# Patient Record
Sex: FEMALE | Race: WHITE | NOT HISPANIC OR LATINO | Employment: OTHER | URBAN - METROPOLITAN AREA
[De-identification: names, ages, dates, MRNs, and addresses within clinical notes are randomized per-mention and may not be internally consistent; named-entity substitution may affect disease eponyms.]

---

## 2019-06-05 ENCOUNTER — APPOINTMENT (OUTPATIENT)
Dept: RADIOLOGY | Facility: MEDICAL CENTER | Age: 69
DRG: 602 | End: 2019-06-05
Attending: HOSPITALIST
Payer: MEDICARE

## 2019-06-05 ENCOUNTER — APPOINTMENT (OUTPATIENT)
Dept: RADIOLOGY | Facility: MEDICAL CENTER | Age: 69
DRG: 602 | End: 2019-06-05
Attending: EMERGENCY MEDICINE
Payer: MEDICARE

## 2019-06-05 ENCOUNTER — APPOINTMENT (OUTPATIENT)
Dept: CARDIOLOGY | Facility: MEDICAL CENTER | Age: 69
DRG: 602 | End: 2019-06-05
Attending: HOSPITALIST
Payer: MEDICARE

## 2019-06-05 ENCOUNTER — HOSPITAL ENCOUNTER (OUTPATIENT)
Dept: RADIOLOGY | Facility: MEDICAL CENTER | Age: 69
End: 2019-06-05

## 2019-06-05 ENCOUNTER — HOSPITAL ENCOUNTER (INPATIENT)
Facility: MEDICAL CENTER | Age: 69
LOS: 5 days | DRG: 602 | End: 2019-06-10
Attending: EMERGENCY MEDICINE | Admitting: HOSPITALIST
Payer: MEDICARE

## 2019-06-05 DIAGNOSIS — L03.116 CELLULITIS AND ABSCESS OF LEFT LOWER EXTREMITY: ICD-10-CM

## 2019-06-05 DIAGNOSIS — E66.01 CLASS 3 SEVERE OBESITY DUE TO EXCESS CALORIES WITH SERIOUS COMORBIDITY AND BODY MASS INDEX (BMI) OF 45.0 TO 49.9 IN ADULT (HCC): ICD-10-CM

## 2019-06-05 DIAGNOSIS — L02.416 CELLULITIS AND ABSCESS OF LEFT LOWER EXTREMITY: ICD-10-CM

## 2019-06-05 DIAGNOSIS — J96.21 ACUTE ON CHRONIC RESPIRATORY FAILURE WITH HYPOXIA (HCC): ICD-10-CM

## 2019-06-05 PROBLEM — E03.8 OTHER SPECIFIED HYPOTHYROIDISM: Status: ACTIVE | Noted: 2019-06-05

## 2019-06-05 PROBLEM — R60.0 LOWER EXTREMITY EDEMA: Status: ACTIVE | Noted: 2019-06-05

## 2019-06-05 PROBLEM — R73.9 HYPERGLYCEMIA: Status: ACTIVE | Noted: 2019-06-05

## 2019-06-05 PROBLEM — E87.6 HYPOKALEMIA: Status: ACTIVE | Noted: 2019-06-05

## 2019-06-05 PROBLEM — I10 HYPERTENSION: Status: ACTIVE | Noted: 2019-06-05

## 2019-06-05 PROBLEM — J41.0 SIMPLE CHRONIC BRONCHITIS (HCC): Status: ACTIVE | Noted: 2019-06-05

## 2019-06-05 PROBLEM — D75.89 MACROCYTOSIS: Status: ACTIVE | Noted: 2019-06-05

## 2019-06-05 PROBLEM — E53.8 VITAMIN B12 DEFICIENCY: Status: ACTIVE | Noted: 2019-06-05

## 2019-06-05 PROBLEM — J18.9 PNEUMONIA DUE TO INFECTIOUS ORGANISM: Status: ACTIVE | Noted: 2019-06-05

## 2019-06-05 LAB
ANION GAP SERPL CALC-SCNC: 8 MMOL/L (ref 0–11.9)
BASOPHILS # BLD AUTO: 0.3 % (ref 0–1.8)
BASOPHILS # BLD: 0.02 K/UL (ref 0–0.12)
BNP SERPL-MCNC: 26 PG/ML (ref 0–100)
BUN SERPL-MCNC: 21 MG/DL (ref 8–22)
CALCIUM SERPL-MCNC: 9.3 MG/DL (ref 8.5–10.5)
CHLORIDE SERPL-SCNC: 99 MMOL/L (ref 96–112)
CO2 SERPL-SCNC: 32 MMOL/L (ref 20–33)
CORTIS SERPL-MCNC: 11 UG/DL (ref 0–23)
CREAT SERPL-MCNC: 1 MG/DL (ref 0.5–1.4)
EKG IMPRESSION: NORMAL
EOSINOPHIL # BLD AUTO: 0.07 K/UL (ref 0–0.51)
EOSINOPHIL NFR BLD: 1.2 % (ref 0–6.9)
ERYTHROCYTE [DISTWIDTH] IN BLOOD BY AUTOMATED COUNT: 50.2 FL (ref 35.9–50)
EST. AVERAGE GLUCOSE BLD GHB EST-MCNC: 111 MG/DL
FOLATE SERPL-MCNC: 6.4 NG/ML
GLUCOSE SERPL-MCNC: 132 MG/DL (ref 65–99)
HBA1C MFR BLD: 5.5 % (ref 0–5.6)
HCT VFR BLD AUTO: 46.1 % (ref 37–47)
HGB BLD-MCNC: 14.7 G/DL (ref 12–16)
IMM GRANULOCYTES # BLD AUTO: 0.03 K/UL (ref 0–0.11)
IMM GRANULOCYTES NFR BLD AUTO: 0.5 % (ref 0–0.9)
LV EJECT FRACT  99904: 70
LV EJECT FRACT MOD 2C 99903: 87.54
LV EJECT FRACT MOD 4C 99902: 83.59
LV EJECT FRACT MOD BP 99901: 86.56
LYMPHOCYTES # BLD AUTO: 0.58 K/UL (ref 1–4.8)
LYMPHOCYTES NFR BLD: 9.5 % (ref 22–41)
MCH RBC QN AUTO: 32.8 PG (ref 27–33)
MCHC RBC AUTO-ENTMCNC: 31.9 G/DL (ref 33.6–35)
MCV RBC AUTO: 102.9 FL (ref 81.4–97.8)
MONOCYTES # BLD AUTO: 0.16 K/UL (ref 0–0.85)
MONOCYTES NFR BLD AUTO: 2.6 % (ref 0–13.4)
NEUTROPHILS # BLD AUTO: 5.22 K/UL (ref 2–7.15)
NEUTROPHILS NFR BLD: 85.9 % (ref 44–72)
NRBC # BLD AUTO: 0 K/UL
NRBC BLD-RTO: 0 /100 WBC
PLATELET # BLD AUTO: 133 K/UL (ref 164–446)
PMV BLD AUTO: 10.1 FL (ref 9–12.9)
POTASSIUM SERPL-SCNC: 3.1 MMOL/L (ref 3.6–5.5)
PROCALCITONIN SERPL-MCNC: 0.08 NG/ML
RBC # BLD AUTO: 4.48 M/UL (ref 4.2–5.4)
SODIUM SERPL-SCNC: 139 MMOL/L (ref 135–145)
T4 FREE SERPL-MCNC: 0.35 NG/DL (ref 0.53–1.43)
TROPONIN I SERPL-MCNC: <0.01 NG/ML (ref 0–0.04)
TSH SERPL DL<=0.005 MIU/L-ACNC: 21.77 UIU/ML (ref 0.38–5.33)
VIT B12 SERPL-MCNC: 237 PG/ML (ref 211–911)
WBC # BLD AUTO: 6.1 K/UL (ref 4.8–10.8)

## 2019-06-05 PROCEDURE — 94760 N-INVAS EAR/PLS OXIMETRY 1: CPT

## 2019-06-05 PROCEDURE — 93306 TTE W/DOPPLER COMPLETE: CPT

## 2019-06-05 PROCEDURE — 96375 TX/PRO/DX INJ NEW DRUG ADDON: CPT

## 2019-06-05 PROCEDURE — 84484 ASSAY OF TROPONIN QUANT: CPT

## 2019-06-05 PROCEDURE — 96365 THER/PROPH/DIAG IV INF INIT: CPT

## 2019-06-05 PROCEDURE — 700105 HCHG RX REV CODE 258: Performed by: HOSPITALIST

## 2019-06-05 PROCEDURE — 93971 EXTREMITY STUDY: CPT | Mod: 26,LT,GZ | Performed by: SURGERY

## 2019-06-05 PROCEDURE — 770020 HCHG ROOM/CARE - TELE (206)

## 2019-06-05 PROCEDURE — 93306 TTE W/DOPPLER COMPLETE: CPT | Mod: 26 | Performed by: INTERNAL MEDICINE

## 2019-06-05 PROCEDURE — 304561 HCHG STAT O2

## 2019-06-05 PROCEDURE — 93005 ELECTROCARDIOGRAM TRACING: CPT | Performed by: EMERGENCY MEDICINE

## 2019-06-05 PROCEDURE — 700111 HCHG RX REV CODE 636 W/ 250 OVERRIDE (IP): Performed by: HOSPITALIST

## 2019-06-05 PROCEDURE — 700101 HCHG RX REV CODE 250: Performed by: HOSPITALIST

## 2019-06-05 PROCEDURE — 99285 EMERGENCY DEPT VISIT HI MDM: CPT

## 2019-06-05 PROCEDURE — 83036 HEMOGLOBIN GLYCOSYLATED A1C: CPT

## 2019-06-05 PROCEDURE — 93971 EXTREMITY STUDY: CPT | Mod: LT

## 2019-06-05 PROCEDURE — 80048 BASIC METABOLIC PNL TOTAL CA: CPT

## 2019-06-05 PROCEDURE — 94640 AIRWAY INHALATION TREATMENT: CPT

## 2019-06-05 PROCEDURE — 84443 ASSAY THYROID STIM HORMONE: CPT

## 2019-06-05 PROCEDURE — 700102 HCHG RX REV CODE 250 W/ 637 OVERRIDE(OP): Performed by: HOSPITALIST

## 2019-06-05 PROCEDURE — 85025 COMPLETE CBC W/AUTO DIFF WBC: CPT

## 2019-06-05 PROCEDURE — 71045 X-RAY EXAM CHEST 1 VIEW: CPT

## 2019-06-05 PROCEDURE — A9270 NON-COVERED ITEM OR SERVICE: HCPCS | Performed by: HOSPITALIST

## 2019-06-05 PROCEDURE — 82607 VITAMIN B-12: CPT

## 2019-06-05 PROCEDURE — 84439 ASSAY OF FREE THYROXINE: CPT

## 2019-06-05 PROCEDURE — 84145 PROCALCITONIN (PCT): CPT

## 2019-06-05 PROCEDURE — 82746 ASSAY OF FOLIC ACID SERUM: CPT

## 2019-06-05 PROCEDURE — 82533 TOTAL CORTISOL: CPT

## 2019-06-05 PROCEDURE — 83880 ASSAY OF NATRIURETIC PEPTIDE: CPT

## 2019-06-05 PROCEDURE — 93005 ELECTROCARDIOGRAM TRACING: CPT

## 2019-06-05 PROCEDURE — 99223 1ST HOSP IP/OBS HIGH 75: CPT | Performed by: HOSPITALIST

## 2019-06-05 RX ORDER — AMOXICILLIN 250 MG
2 CAPSULE ORAL 2 TIMES DAILY
Status: DISCONTINUED | OUTPATIENT
Start: 2019-06-05 | End: 2019-06-06

## 2019-06-05 RX ORDER — CYANOCOBALAMIN 1000 UG/ML
1000 INJECTION, SOLUTION INTRAMUSCULAR; SUBCUTANEOUS ONCE
Status: COMPLETED | OUTPATIENT
Start: 2019-06-05 | End: 2019-06-05

## 2019-06-05 RX ORDER — ALBUTEROL SULFATE 90 UG/1
2 AEROSOL, METERED RESPIRATORY (INHALATION) EVERY 6 HOURS PRN
Status: ON HOLD | COMMUNITY
End: 2019-10-29

## 2019-06-05 RX ORDER — HYDROCODONE BITARTRATE AND ACETAMINOPHEN 5; 325 MG/1; MG/1
1 TABLET ORAL DAILY
Status: ON HOLD | COMMUNITY
End: 2019-10-29

## 2019-06-05 RX ORDER — POTASSIUM CHLORIDE 20 MEQ/1
20 TABLET, EXTENDED RELEASE ORAL ONCE
Status: DISCONTINUED | OUTPATIENT
Start: 2019-06-05 | End: 2019-06-05

## 2019-06-05 RX ORDER — AZITHROMYCIN 250 MG/1
500 TABLET, FILM COATED ORAL DAILY
Status: DISCONTINUED | OUTPATIENT
Start: 2019-06-05 | End: 2019-06-06

## 2019-06-05 RX ORDER — POLYETHYLENE GLYCOL 3350 17 G/17G
1 POWDER, FOR SOLUTION ORAL
Status: DISCONTINUED | OUTPATIENT
Start: 2019-06-05 | End: 2019-06-06

## 2019-06-05 RX ORDER — NADOLOL 40 MG/1
40 TABLET ORAL DAILY
Status: ON HOLD | COMMUNITY
End: 2019-11-19

## 2019-06-05 RX ORDER — OXYCODONE HYDROCHLORIDE 5 MG/1
2.5 TABLET ORAL
Status: DISCONTINUED | OUTPATIENT
Start: 2019-06-05 | End: 2019-06-10 | Stop reason: HOSPADM

## 2019-06-05 RX ORDER — BISACODYL 10 MG
10 SUPPOSITORY, RECTAL RECTAL
Status: DISCONTINUED | OUTPATIENT
Start: 2019-06-05 | End: 2019-06-06

## 2019-06-05 RX ORDER — POTASSIUM CHLORIDE 20 MEQ/1
40 TABLET, EXTENDED RELEASE ORAL 2 TIMES DAILY
Status: DISCONTINUED | OUTPATIENT
Start: 2019-06-05 | End: 2019-06-10

## 2019-06-05 RX ORDER — ACETAMINOPHEN 325 MG/1
650 TABLET ORAL EVERY 6 HOURS PRN
Status: DISCONTINUED | OUTPATIENT
Start: 2019-06-05 | End: 2019-06-10 | Stop reason: HOSPADM

## 2019-06-05 RX ORDER — HYDROMORPHONE HYDROCHLORIDE 1 MG/ML
0.25 INJECTION, SOLUTION INTRAMUSCULAR; INTRAVENOUS; SUBCUTANEOUS
Status: DISCONTINUED | OUTPATIENT
Start: 2019-06-05 | End: 2019-06-10 | Stop reason: HOSPADM

## 2019-06-05 RX ORDER — ONDANSETRON 4 MG/1
4 TABLET, ORALLY DISINTEGRATING ORAL EVERY 4 HOURS PRN
Status: DISCONTINUED | OUTPATIENT
Start: 2019-06-05 | End: 2019-06-10 | Stop reason: HOSPADM

## 2019-06-05 RX ORDER — ONDANSETRON 2 MG/ML
4 INJECTION INTRAMUSCULAR; INTRAVENOUS EVERY 4 HOURS PRN
Status: DISCONTINUED | OUTPATIENT
Start: 2019-06-05 | End: 2019-06-10 | Stop reason: HOSPADM

## 2019-06-05 RX ORDER — IPRATROPIUM BROMIDE AND ALBUTEROL SULFATE 2.5; .5 MG/3ML; MG/3ML
3 SOLUTION RESPIRATORY (INHALATION)
Status: DISCONTINUED | OUTPATIENT
Start: 2019-06-05 | End: 2019-06-10 | Stop reason: HOSPADM

## 2019-06-05 RX ORDER — FUROSEMIDE 10 MG/ML
20 INJECTION INTRAMUSCULAR; INTRAVENOUS DAILY
Status: DISCONTINUED | OUTPATIENT
Start: 2019-06-05 | End: 2019-06-08

## 2019-06-05 RX ORDER — CHOLECALCIFEROL (VITAMIN D3) 125 MCG
1000 CAPSULE ORAL DAILY
Status: DISCONTINUED | OUTPATIENT
Start: 2019-06-06 | End: 2019-06-10 | Stop reason: HOSPADM

## 2019-06-05 RX ORDER — OXYCODONE HYDROCHLORIDE 5 MG/1
5 TABLET ORAL
Status: DISCONTINUED | OUTPATIENT
Start: 2019-06-05 | End: 2019-06-10 | Stop reason: HOSPADM

## 2019-06-05 RX ORDER — ALBUTEROL SULFATE 90 UG/1
2 AEROSOL, METERED RESPIRATORY (INHALATION) EVERY 4 HOURS PRN
Status: DISCONTINUED | OUTPATIENT
Start: 2019-06-05 | End: 2019-06-10 | Stop reason: HOSPADM

## 2019-06-05 RX ORDER — LEVOTHYROXINE SODIUM 0.12 MG/1
125 TABLET ORAL
Status: DISCONTINUED | OUTPATIENT
Start: 2019-06-05 | End: 2019-06-05

## 2019-06-05 RX ORDER — LEVOTHYROXINE SODIUM 0.12 MG/1
125 TABLET ORAL
Status: ON HOLD | COMMUNITY
End: 2019-06-10

## 2019-06-05 RX ORDER — NADOLOL 20 MG/1
20 TABLET ORAL
Status: DISCONTINUED | OUTPATIENT
Start: 2019-06-05 | End: 2019-06-05

## 2019-06-05 RX ADMIN — FUROSEMIDE 20 MG: 10 INJECTION, SOLUTION INTRAVENOUS at 05:45

## 2019-06-05 RX ADMIN — CYANOCOBALAMIN 1000 MCG: 1000 INJECTION INTRAMUSCULAR; SUBCUTANEOUS at 17:56

## 2019-06-05 RX ADMIN — POTASSIUM CHLORIDE 40 MEQ: 1500 TABLET, EXTENDED RELEASE ORAL at 05:45

## 2019-06-05 RX ADMIN — SENNOSIDES,DOCUSATE SODIUM 2 TABLET: 8.6; 5 TABLET, FILM COATED ORAL at 16:58

## 2019-06-05 RX ADMIN — IPRATROPIUM BROMIDE AND ALBUTEROL SULFATE 3 ML: .5; 3 SOLUTION RESPIRATORY (INHALATION) at 21:42

## 2019-06-05 RX ADMIN — AZITHROMYCIN 500 MG: 250 TABLET, FILM COATED ORAL at 05:45

## 2019-06-05 RX ADMIN — CEFTRIAXONE SODIUM 2 G: 2 INJECTION, POWDER, FOR SOLUTION INTRAMUSCULAR; INTRAVENOUS at 05:44

## 2019-06-05 RX ADMIN — POTASSIUM CHLORIDE 40 MEQ: 1500 TABLET, EXTENDED RELEASE ORAL at 16:58

## 2019-06-05 RX ADMIN — LEVOTHYROXINE SODIUM 137 MCG: 25 TABLET ORAL at 11:12

## 2019-06-05 RX ADMIN — BISACODYL 10 MG: 10 SUPPOSITORY RECTAL at 16:58

## 2019-06-05 RX ADMIN — ENOXAPARIN SODIUM 30 MG: 100 INJECTION SUBCUTANEOUS at 05:45

## 2019-06-05 RX ADMIN — ENOXAPARIN SODIUM 30 MG: 100 INJECTION SUBCUTANEOUS at 16:58

## 2019-06-05 RX ADMIN — ONDANSETRON 4 MG: 2 INJECTION INTRAMUSCULAR; INTRAVENOUS at 08:20

## 2019-06-05 RX ADMIN — ACETAMINOPHEN 650 MG: 325 TABLET, FILM COATED ORAL at 13:26

## 2019-06-05 ASSESSMENT — PATIENT HEALTH QUESTIONNAIRE - PHQ9
SUM OF ALL RESPONSES TO PHQ9 QUESTIONS 1 AND 2: 0
1. LITTLE INTEREST OR PLEASURE IN DOING THINGS: NOT AT ALL
2. FEELING DOWN, DEPRESSED, IRRITABLE, OR HOPELESS: NOT AT ALL

## 2019-06-05 ASSESSMENT — COPD QUESTIONNAIRES
HAVE YOU SMOKED AT LEAST 100 CIGARETTES IN YOUR ENTIRE LIFE: YES
IN THE PAST 12 MONTHS DO YOU DO LESS THAN YOU USED TO BECAUSE OF YOUR BREATHING PROBLEMS: AGREE
DURING THE PAST 4 WEEKS HOW MUCH DID YOU FEEL SHORT OF BREATH: SOME OF THE TIME
DO YOU EVER COUGH UP ANY MUCUS OR PHLEGM?: YES, A FEW DAYS A WEEK OR MONTH
COPD SCREENING SCORE: 8
DO YOU EVER COUGH UP ANY MUCUS OR PHLEGM?: YES, A FEW DAYS A WEEK OR MONTH
HAVE YOU SMOKED AT LEAST 100 CIGARETTES IN YOUR ENTIRE LIFE: YES
COPD SCREENING SCORE: 7
DURING THE PAST 4 WEEKS HOW MUCH DID YOU FEEL SHORT OF BREATH: SOME OF THE TIME

## 2019-06-05 ASSESSMENT — COGNITIVE AND FUNCTIONAL STATUS - GENERAL
DRESSING REGULAR UPPER BODY CLOTHING: A LOT
DRESSING REGULAR LOWER BODY CLOTHING: A LOT
WALKING IN HOSPITAL ROOM: TOTAL
TOILETING: A LOT
TURNING FROM BACK TO SIDE WHILE IN FLAT BAD: A LOT
MOVING FROM LYING ON BACK TO SITTING ON SIDE OF FLAT BED: UNABLE
MOBILITY SCORE: 8
SUGGESTED CMS G CODE MODIFIER DAILY ACTIVITY: CK
STANDING UP FROM CHAIR USING ARMS: TOTAL
HELP NEEDED FOR BATHING: A LOT
SUGGESTED CMS G CODE MODIFIER MOBILITY: CM
CLIMB 3 TO 5 STEPS WITH RAILING: TOTAL
DAILY ACTIVITIY SCORE: 15
MOVING TO AND FROM BED TO CHAIR: A LOT
PERSONAL GROOMING: A LITTLE

## 2019-06-05 ASSESSMENT — LIFESTYLE VARIABLES
EVER_SMOKED: YES
EVER_SMOKED: YES
ALCOHOL_USE: NO

## 2019-06-05 NOTE — ASSESSMENT & PLAN NOTE
hold her nadolol at this time given her low blood pressure  Monitor blood pressure and adjust accordingly

## 2019-06-05 NOTE — PROGRESS NOTES
2 RN skin check complete with KENZIE Arreguin.   Devices in place Oxygen tubing, gonzalez catheter.  Skin assessed under devices: Redness noted to left ear, blanchable..  Confirmed pressure ulcers found: Moisture associated dermatitis to right pannus and under left breast. Moisture fissure noted to sacrum. Heals pink, blanchable. Cellulitis to left leg, red, warm, non-blanchable.   Wound consult placed: Yes.  The following interventions in place: Waffle over lay to mattress, heal elevation boots, mepilex to sacrum, Interdry to pannus and breast folds. Patient turned q2hrs.

## 2019-06-05 NOTE — PROGRESS NOTES
Pt. Arrived via stretcher thru ER . Assumed care. Pt. Is awake, on bed. Initial vital signs taken and documented. Admit profile, med rec and assessment complete .A&Ox4, Patient currently bedbound,  7/10 pain in abdomen, due medications given. IV access saline locked. Pt. On 4L O2 via nasal cannula, tolerating well. Plan of care was discussed. Bed alarm in use, call light and personal belongings within reach, bed kept low, treaded socks on. Assisted as necessary. Kept rested and comfortable at all times.

## 2019-06-05 NOTE — ED NOTES
Pt rounded on, asleep in bed, respirations even and unlabored, repositioning self as needed, awaiting hospital bed.

## 2019-06-05 NOTE — ED NOTES
Med Rec Updated and Complete per Pt at bedside  Allergies Reviewed  No PO ABX last 30 days.    Pt knows medication history well. Pt reports taking Norco 5-325mg once daily every day, scheduled.

## 2019-06-05 NOTE — ED NOTES
Pt rounded on, resting in bed, respirations even and unlabored, repositioning self as needed, water and blankets provided.

## 2019-06-05 NOTE — ED NOTES
Report from Steff EPSINO, assume care. Assist Pt to reposition on gurney, call light within reach.

## 2019-06-05 NOTE — ED TRIAGE NOTES
"Cheryl Bajwa  68 y.o. female    Chief Complaint   Patient presents with   • Shortness of Breath     Pt called EMS after increasing SOB the last few days. Pt typically wears 3L NC at home, pt required 5L PTA. Respirations even and unlabored at this time. [facility] reports possible pneumonia or CHF exacerabtion.    • Peripheral Edema     Pt reports \"the last few days it's been swelling up real bad.\" Bilateral LE 4+ pitting edema and redness noted. [facility] diagnosed pt with LE cellulitis.      Pt given 2l NS PTA.    Pt is alert and oriented, speaking in full sentences, follows commands and responds appropriately to questions. Resp are even and unlabored.   "

## 2019-06-05 NOTE — ASSESSMENT & PLAN NOTE
Possible pneumonia was initially suspected  Initially placed on IV ceftriaxone for her cellulitis and pneumonia and on azithromycin  Procalcitonin negative

## 2019-06-05 NOTE — ED PROVIDER NOTES
"ED Provider Note    Scribed for Angel Rojas M.D. by Angel Rojas. 6/5/2019,  1:16 AM.    CHIEF COMPLAINT  Chief Complaint   Patient presents with   • Shortness of Breath     Pt called EMS after increasing SOB the last few days. Pt typically wears 3L NC at home, pt required 5L PTA. Respirations even and unlabored at this time. [facility] reports possible pneumonia or CHF exacerabtion.    • Peripheral Edema     Pt reports \"the last few days it's been swelling up real bad.\" Bilateral LE 4+ pitting edema and redness noted. [facility] diagnosed pt with LE cellulitis.        HPI  Cheryl Bajwa is a 68 y.o. female who presents to the Emergency Department as a transfer from [facility], with reported diagnoses of left leg cellulitis, shortness of breath, and pneumonia.  Prior to arrival, she received oral and IV antibiotics, azithromycin and Rocephin.  She also received 1500 cc of fluid.  She presented to the outside hospital after calling EMS because of increasing shortness of breath for the past few days, most notably, but perhaps as long as the last month.  She has a history of a chronic cough.  She reports some intermittent nonspecific anterior chest pain over days to weeks as well.  She is no longer smoker.  Because of a history of COPD, she wears 3 L by nasal cannula at home at baseline, but has been requiring 5 L to maintain her usual saturations.  She reports that her bilateral lower extremity edema has been especially notable over the past 4 days.  She denies fevers.  She has not had any documented fevers at the transferring hospital.  I reviewed her laboratory tests, and she has a normal CBC and differential, with the exception of a slight neutrophilic predominance at 78%, and immature granulocytes of 0.3%, which is slightly elevated.  There is no bandemia.  Her chemistry is grossly unremarkable.  She had urinalysis 2+ leuk esterase and 1+ bacteria.  Her BNP was normal.  Apparently, the reason for " "the transfer was that she had an initial blood pressure in the 130s systolic, which decreased to the 90s, though she maintained a reported map around 65, but there was concern that she would need to go to the ICU because of hypotension, though there is no such hypotension on arrival.    Her actual EKG report shows \"there is some apparent increased density in the right hilar region which in part probably is artifactual from rotation but may also be due to underlying pneumonia or mild congestive heart failure.\"  Given her lack of fevers and lack of leukocytosis, I think pneumonia is unlikely.    The patient herself is a very limited historian.  She reports feeling \"punky,\" 4 days to weeks, with a main complaint of being low energy, and of having swollen legs.    REVIEW OF SYSTEMS  See HPI for further details. All other systems are negative.     PAST MEDICAL HISTORY   has a past medical history of Chronic obstructive pulmonary disease (HCC); Hypothyroid; and Mitral valve disease.    SOCIAL HISTORY  Social History     Social History Main Topics   • Smoking status: Former Smoker     Types: Cigarettes     Quit date: 2016   • Smokeless tobacco: Never Used   • Alcohol use No   • Drug use: No   • Sexual activity: Not on file     History   Drug Use No       SURGICAL HISTORY   has a past surgical history that includes gyn surgery.    CURRENT MEDICATIONS  Home Medications    **Home medications have not yet been reviewed for this encounter**         ALLERGIES  Allergies   Allergen Reactions   • Doxycycline      headache   • Penicillins    • Sulfa Drugs        PHYSICAL EXAM  VITAL SIGNS: Pulse (!) 56   Temp 36.1 °C (97 °F) (Temporal)   Resp 18   Ht 1.702 m (5' 7\")   Wt (!) 144.2 kg (318 lb)   SpO2 95%   BMI 49.81 kg/m²   Pulse ox interpretation: I interpret this pulse ox as normal.  Constitutional: Alert in no apparent distress.  Extreme morbid obesity.  HENT: No signs of trauma, Bilateral external ears normal, Nose normal. "   Eyes: Conjunctiva normal, Non-icteric.   Neck: Normal range of motion, Supple, No stridor.   Lymphatic: No lymphadenopathy noted.   Cardiovascular: Regular rate and rhythm, no murmurs.   Thorax & Lungs: Normal breath sounds, No respiratory distress, No wheezing, No chest tenderness.   Abdomen: Extreme morbid obesity, bowel sounds normal, Soft, No tenderness, No masses, No pulsatile masses. No peritoneal signs.  Skin: Warm, Dry, fairly chronic appearing poorly demarcated erythema to the dorsum of the left foot and significant anterior lateral portion of the left lower leg, without warmth or fluctuance.  Extremities: Intact distal pulses, significant pitting edema to the bilateral lower extremities, slightly greater on the left than the right, No cyanosis.  Musculoskeletal: Good range of motion in all major joints. No or major deformities noted.   Neurologic: Alert , Normal motor function, Normal sensory function, No focal deficits noted.   Psychiatric: Affect normal, Judgment normal, Mood normal.     DIAGNOSTIC STUDIES / PROCEDURES    EKG  Interpreted by me    Rhythm:  Normal sinus rhythm   Rate: 56  Axis: normal  Intervals: normal  Ectopy: none  Conduction: 1st degree block. Diffuse low voltage, likely due to morbid obesity.  ST Segments: no acute change  T Waves: no acute change  Q Waves: none  No Old EKG.    LABS  Labs Reviewed   BTYPE NATRIURETIC PEPTIDE   CBC WITH DIFFERENTIAL   BASIC METABOLIC PANEL   TROPONIN   TSH WITH REFLEX TO FT4     All labs reviewed by me.    RADIOLOGY  OUTSIDE IMAGES-DX CHEST   Final Result      DX-CHEST-LIMITED (1 VIEW)    (Results Pending)     The radiologist's interpretation of all radiological studies have been reviewed by me.    COURSE & MEDICAL DECISION MAKING  Nursing notes, VS, PMSFHx reviewed in chart.     1:16 AM Patient seen and examined at bedside. Differential diagnosis includes but is not limited to CHF, pneumonia, cellulitis, hypothyroidism, hypoxia. Ordered for repeat  laboratory tests to evaluate.  The patient's skin changes on her leg seem more likely to represent stasis dermatitis than cellulitis, given her reported chronicity, and lack of fever or leukocytosis.  The peripheral edema is likely secondary to CHF, and the patient reports a cardiac echo years ago, but no recent evaluation, and does not think she carries an official diagnosis of congestive heart failure, nor is she medicated for congestive heart failure.  There is no evidence of instability or hypotension.  Do not think she needs to be admitted to the ICU, though I think she should be admitted for further evaluation of her peripheral edema and increased oxygen requirement/shortness of breath.    1:41 AM I've paged the hospitalist, Dr. Radha Camarillo.    2:01 AM Dr. Radha Camarillo agrees to admit.    DISPOSITION:  Patient will be admitted to the hospitalist, Dr. Radha Camarillo. in stable condition.      FINAL IMPRESSION  1. CHF  2. Peripheral edema  3. Increased oxygen requirement  4. Transient hypotension

## 2019-06-05 NOTE — ASSESSMENT & PLAN NOTE
Given her asymmetric edema we will check left lower extremity duplex  Echo unremarkable  We will start on IV Lasix and monitor intake and output

## 2019-06-05 NOTE — PROGRESS NOTES
Hospital Medicine Daily Progress Note    Date of Service  6/5/2019    Chief Complaint  68 y.o. female admitted 6/5/2019 with Cellulitis and possibly pneumonia    Interval Problem Update  6/4: Admitted and levothyroxine increased.  Still having soft blood pressures although patient says this is normal for her.  Confirmed the patient does have RT protocol so she can get nebulizations   Repleting B12 IM and then orally      Disposition  Pending improvement of cellulitis and possibly pneumonia    Review of Systems  ROS     Physical Exam  Temp:  [36.1 °C (97 °F)-36.4 °C (97.5 °F)] 36.4 °C (97.5 °F)  Pulse:  [54-71] 65  Resp:  [14-28] 20  SpO2:  [90 %-96 %] 94 %    Physical Exam    Fluids    Intake/Output Summary (Last 24 hours) at 06/05/19 1555  Last data filed at 06/05/19 0629   Gross per 24 hour   Intake              100 ml   Output                0 ml   Net              100 ml       Laboratory  Recent Labs      06/05/19   0141   WBC  6.1   RBC  4.48   HEMOGLOBIN  14.7   HEMATOCRIT  46.1   MCV  102.9*   MCH  32.8   MCHC  31.9*   RDW  50.2*   PLATELETCT  133*   MPV  10.1     Recent Labs      06/05/19   0141   SODIUM  139   POTASSIUM  3.1*   CHLORIDE  99   CO2  32   GLUCOSE  132*   BUN  21   CREATININE  1.00   CALCIUM  9.3         Recent Labs      06/05/19   0141   BNPBTYPENAT  26           Imaging  US-EXTREMITY VENOUS LOWER UNILAT LEFT   Final Result      EC-ECHOCARDIOGRAM COMPLETE W/O CONT         DX-CHEST-LIMITED (1 VIEW)   Final Result         Persistent bibasilar consolidation, right greater than left with probable associated pleural fluid.      OUTSIDE IMAGES-DX CHEST   Final Result           Assessment/Plan  Hyperglycemia   Assessment & Plan    HbA1c 5.5  We will manage insulin levels     Macrocytosis   Assessment & Plan    B12 low at 237 folate unremarkable  Repleting B12 IM and then orally     Hypokalemia   Assessment & Plan    Replete with oral potassium chloride 40 mEq twice daily and monitor levels  Check  magnesium     Simple chronic bronchitis (HCC)   Assessment & Plan    No signs of acute exacerbation   bronchodilators per RT protocol     Hypertension   Assessment & Plan    We will hold her nadolol at this time given her low blood pressure prior to transfer  Monitor blood pressure and adjust accordingly     Other specified hypothyroidism   Assessment & Plan    We will increase her levothyroxine to 137 MCG she will need recheck TFTs in 6 to 8 weeks     Pneumonia due to infectious organism   Assessment & Plan    Possible pneumonia    We will start her on IV ceftriaxone for her cellulitis and pneumonia and on azithromycin  Check procalcitonin  Follow-up on cultures and de-escalate accordingly  She will need follow-up imaging persistent infiltrate may need further work-up with CT     Lower extremity edema   Assessment & Plan    Given her asymmetric edema we will check left lower extremity duplex  We will check echocardiogram to assess RV function  We will start on IV Lasix and monitor intake and output     Class 3 severe obesity due to excess calories with serious comorbidity and body mass index (BMI) of 45.0 to 49.9 in adult ([facility])   Assessment & Plan    Body mass index is 49.81 kg/m².      Cellulitis and abscess of left lower extremity   Assessment & Plan    Will start on IV ceftriaxone  Close clinical monitoring          VTE prophylaxis: enoxaparin

## 2019-06-05 NOTE — H&P
Hospital Medicine History & Physical Note    Date of Service  6/5/2019    Primary Care Physician  No primary care provider on file.    Consultants  None    Code Status  Full code    Chief Complaint  Edema and dyspnea    History of Presenting Illness  68 y.o. female who presented 6/5/2019 with history of COPD sleep apnea and hypothyroidism.  She was seen at her local emergency room there was concern for possible pneumonia and cellulitis she had one low systolic blood pressure reading in the 90s so she was transferred to our facility for higher level of care.  Patient has a cough which is productive of clear sputum she has not had any documented fever.  She is an overall poor historian she has noted increased swelling in her lower extremities over the past few days and has noted redness in her left lower extremity over the past day or so.  She denies any abdominal pain nausea vomiting.  No dysuria or gross hematuria.  She denies any known history of cardiac disease.    Review of Systems  Review of Systems   All other systems reviewed and are negative.      Past Medical History   has a past medical history of Chronic obstructive pulmonary disease (HCC); Hypothyroid; and Mitral valve disease.    Surgical History   has a past surgical history that includes gyn surgery.     Family History  Reviewed and not pertinent to the presenting problem    Social History   reports that she quit smoking about 3 years ago. Her smoking use included Cigarettes. She has never used smokeless tobacco. She reports that she does not drink alcohol or use drugs.    Allergies  Allergies   Allergen Reactions   • Doxycycline      headache   • Penicillins    • Sulfa Drugs        Medications  None   She takes Corgard levothyroxine and Vicodin    Physical Exam  Temp:  [36.1 °C (97 °F)] 36.1 °C (97 °F)  Pulse:  [54-60] 54  Resp:  [14-21] 14  SpO2:  [90 %-95 %] 90 %    Physical Exam   Constitutional: She is oriented to person, place, and time. She  appears well-developed and well-nourished.   Obese   HENT:   Head: Normocephalic and atraumatic.   Right Ear: External ear normal.   Left Ear: External ear normal.   Mouth/Throat: No oropharyngeal exudate.   Eyes: Conjunctivae are normal. Right eye exhibits no discharge. Left eye exhibits no discharge. No scleral icterus.   Neck: Neck supple. No JVD present. No tracheal deviation present.   Cardiovascular: Normal rate and regular rhythm.  Exam reveals no gallop and no friction rub.    No murmur heard.  Pulmonary/Chest: Effort normal. No stridor. No respiratory distress. She has decreased breath sounds. She has no wheezes. She has rales. She exhibits no tenderness.   Abdominal: Soft. Bowel sounds are normal. She exhibits no distension and no mass. There is no tenderness. There is no rebound and no guarding.   Musculoskeletal: She exhibits edema (3+ left lower extremity 2+ right lower extremity). She exhibits no tenderness.   Neurological: She is alert and oriented to person, place, and time. No cranial nerve deficit. She exhibits normal muscle tone.   Skin: Skin is warm and dry. She is not diaphoretic. There is erythema (Distal left lower extremity). No cyanosis. Nails show no clubbing.   Psychiatric: She has a normal mood and affect. Her behavior is normal. Thought content normal.   Nursing note and vitals reviewed.      Laboratory:  Recent Labs      06/05/19 0141   WBC  6.1   RBC  4.48   HEMOGLOBIN  14.7   HEMATOCRIT  46.1   MCV  102.9*   MCH  32.8   MCHC  31.9*   RDW  50.2*   PLATELETCT  133*   MPV  10.1     Recent Labs      06/05/19 0141   SODIUM  139   POTASSIUM  3.1*   CHLORIDE  99   CO2  32   GLUCOSE  132*   BUN  21   CREATININE  1.00   CALCIUM  9.3     Recent Labs      06/05/19   0141   GLUCOSE  132*         Recent Labs      06/05/19 0141   BNPBTYPENAT  26         Recent Labs      06/05/19 0141   TROPONINI  <0.01       Urinalysis:    No results found     Imaging:  DX-CHEST-LIMITED (1 VIEW)   Final  Result         Persistent bibasilar consolidation, right greater than left with probable associated pleural fluid.      OUTSIDE IMAGES-DX CHEST   Final Result      EC-ECHOCARDIOGRAM COMPLETE W/O CONT    (Results Pending)   US-EXTREMITY VENOUS LOWER UNILAT LEFT    (Results Pending)   DX-CHEST-2 VIEWS    (Results Pending)         Assessment/Plan:  I anticipate this patient will require at least two midnights for appropriate medical management, necessitating inpatient admission.    Hyperglycemia   Assessment & Plan    Check HbA1c and recheck fasting level     Macrocytosis   Assessment & Plan    Check B12 and folate levels     Hypokalemia   Assessment & Plan    Replete with oral potassium chloride 40 mEq twice daily and monitor levels  Check magnesium     Simple chronic bronchitis (HCC)   Assessment & Plan    No signs of acute exacerbation   bronchodilators per RT protocol     Hypertension   Assessment & Plan    We will hold her nadolol at this time given her low blood pressure prior to transfer  Monitor blood pressure and adjust accordingly     Other specified hypothyroidism   Assessment & Plan    We will increase her levothyroxine to 137 MCG she will need recheck TFTs in 6 to 8 weeks     Pneumonia due to infectious organism   Assessment & Plan    Possible pneumonia    We will start her on IV ceftriaxone for her cellulitis and pneumonia and on azithromycin  Check procalcitonin  Follow-up on cultures and de-escalate accordingly  She will need follow-up imaging persistent infiltrate may need further work-up with CT     Lower extremity edema   Assessment & Plan    Given her asymmetric edema we will check left lower extremity duplex  We will check echocardiogram to assess RV function  We will start on IV Lasix and monitor intake and output     Class 3 severe obesity due to excess calories with serious comorbidity and body mass index (BMI) of 45.0 to 49.9 in adult (HCC)   Assessment & Plan    Body mass index is 49.81 kg/m².       Cellulitis and abscess of left lower extremity   Assessment & Plan    Will start on IV ceftriaxone  Close clinical monitoring         VTE prophylaxis: Lovenox

## 2019-06-05 NOTE — ED NOTES
Pt rounded on, resting in bed, respirations even and unlabored, repositioning self as needed, hospital bed ordered.

## 2019-06-06 LAB
ANION GAP SERPL CALC-SCNC: 6 MMOL/L (ref 0–11.9)
BASOPHILS # BLD AUTO: 0.2 % (ref 0–1.8)
BASOPHILS # BLD: 0.02 K/UL (ref 0–0.12)
BUN SERPL-MCNC: 19 MG/DL (ref 8–22)
CALCIUM SERPL-MCNC: 8.7 MG/DL (ref 8.5–10.5)
CHLORIDE SERPL-SCNC: 100 MMOL/L (ref 96–112)
CO2 SERPL-SCNC: 32 MMOL/L (ref 20–33)
CREAT SERPL-MCNC: 0.95 MG/DL (ref 0.5–1.4)
EOSINOPHIL # BLD AUTO: 0 K/UL (ref 0–0.51)
EOSINOPHIL NFR BLD: 0 % (ref 0–6.9)
ERYTHROCYTE [DISTWIDTH] IN BLOOD BY AUTOMATED COUNT: 50 FL (ref 35.9–50)
GLUCOSE SERPL-MCNC: 126 MG/DL (ref 65–99)
HCT VFR BLD AUTO: 45.3 % (ref 37–47)
HGB BLD-MCNC: 14 G/DL (ref 12–16)
IMM GRANULOCYTES # BLD AUTO: 0.04 K/UL (ref 0–0.11)
IMM GRANULOCYTES NFR BLD AUTO: 0.4 % (ref 0–0.9)
LYMPHOCYTES # BLD AUTO: 0.28 K/UL (ref 1–4.8)
LYMPHOCYTES NFR BLD: 3 % (ref 22–41)
MAGNESIUM SERPL-MCNC: 2.2 MG/DL (ref 1.5–2.5)
MCH RBC QN AUTO: 32.3 PG (ref 27–33)
MCHC RBC AUTO-ENTMCNC: 30.9 G/DL (ref 33.6–35)
MCV RBC AUTO: 104.4 FL (ref 81.4–97.8)
MONOCYTES # BLD AUTO: 0.43 K/UL (ref 0–0.85)
MONOCYTES NFR BLD AUTO: 4.6 % (ref 0–13.4)
NEUTROPHILS # BLD AUTO: 8.56 K/UL (ref 2–7.15)
NEUTROPHILS NFR BLD: 91.8 % (ref 44–72)
NRBC # BLD AUTO: 0 K/UL
NRBC BLD-RTO: 0 /100 WBC
PLATELET # BLD AUTO: 119 K/UL (ref 164–446)
PMV BLD AUTO: 10.5 FL (ref 9–12.9)
POTASSIUM SERPL-SCNC: 3.5 MMOL/L (ref 3.6–5.5)
RBC # BLD AUTO: 4.34 M/UL (ref 4.2–5.4)
SODIUM SERPL-SCNC: 138 MMOL/L (ref 135–145)
WBC # BLD AUTO: 9.3 K/UL (ref 4.8–10.8)

## 2019-06-06 PROCEDURE — 700111 HCHG RX REV CODE 636 W/ 250 OVERRIDE (IP): Performed by: HOSPITALIST

## 2019-06-06 PROCEDURE — 700102 HCHG RX REV CODE 250 W/ 637 OVERRIDE(OP): Performed by: HOSPITALIST

## 2019-06-06 PROCEDURE — 770020 HCHG ROOM/CARE - TELE (206)

## 2019-06-06 PROCEDURE — 80048 BASIC METABOLIC PNL TOTAL CA: CPT

## 2019-06-06 PROCEDURE — 85025 COMPLETE CBC W/AUTO DIFF WBC: CPT

## 2019-06-06 PROCEDURE — 700101 HCHG RX REV CODE 250: Performed by: HOSPITALIST

## 2019-06-06 PROCEDURE — 83735 ASSAY OF MAGNESIUM: CPT

## 2019-06-06 PROCEDURE — A9270 NON-COVERED ITEM OR SERVICE: HCPCS | Performed by: HOSPITALIST

## 2019-06-06 PROCEDURE — 94640 AIRWAY INHALATION TREATMENT: CPT

## 2019-06-06 PROCEDURE — 94760 N-INVAS EAR/PLS OXIMETRY 1: CPT

## 2019-06-06 PROCEDURE — 36415 COLL VENOUS BLD VENIPUNCTURE: CPT

## 2019-06-06 PROCEDURE — 99232 SBSQ HOSP IP/OBS MODERATE 35: CPT | Performed by: HOSPITALIST

## 2019-06-06 PROCEDURE — 700105 HCHG RX REV CODE 258: Performed by: HOSPITALIST

## 2019-06-06 RX ORDER — AMOXICILLIN 250 MG
2 CAPSULE ORAL 2 TIMES DAILY PRN
Status: DISCONTINUED | OUTPATIENT
Start: 2019-06-06 | End: 2019-06-10 | Stop reason: HOSPADM

## 2019-06-06 RX ORDER — POLYETHYLENE GLYCOL 3350 17 G/17G
1 POWDER, FOR SOLUTION ORAL
Status: DISCONTINUED | OUTPATIENT
Start: 2019-06-06 | End: 2019-06-10 | Stop reason: HOSPADM

## 2019-06-06 RX ORDER — CEPHALEXIN 500 MG/1
500 CAPSULE ORAL EVERY 6 HOURS
Status: COMPLETED | OUTPATIENT
Start: 2019-06-06 | End: 2019-06-10

## 2019-06-06 RX ORDER — BISACODYL 10 MG
10 SUPPOSITORY, RECTAL RECTAL
Status: DISCONTINUED | OUTPATIENT
Start: 2019-06-06 | End: 2019-06-10 | Stop reason: HOSPADM

## 2019-06-06 RX ADMIN — ENOXAPARIN SODIUM 30 MG: 100 INJECTION SUBCUTANEOUS at 05:11

## 2019-06-06 RX ADMIN — LEVOTHYROXINE SODIUM 137 MCG: 25 TABLET ORAL at 05:11

## 2019-06-06 RX ADMIN — AZITHROMYCIN 500 MG: 250 TABLET, FILM COATED ORAL at 05:11

## 2019-06-06 RX ADMIN — FUROSEMIDE 20 MG: 10 INJECTION, SOLUTION INTRAVENOUS at 05:12

## 2019-06-06 RX ADMIN — CEPHALEXIN 500 MG: 500 CAPSULE ORAL at 17:19

## 2019-06-06 RX ADMIN — POTASSIUM CHLORIDE 40 MEQ: 1500 TABLET, EXTENDED RELEASE ORAL at 17:18

## 2019-06-06 RX ADMIN — CEFTRIAXONE SODIUM 2 G: 2 INJECTION, POWDER, FOR SOLUTION INTRAMUSCULAR; INTRAVENOUS at 05:12

## 2019-06-06 RX ADMIN — POTASSIUM CHLORIDE 40 MEQ: 1500 TABLET, EXTENDED RELEASE ORAL at 05:20

## 2019-06-06 RX ADMIN — CYANOCOBALAMIN TAB 500 MCG 1000 MCG: 500 TAB at 05:11

## 2019-06-06 RX ADMIN — IPRATROPIUM BROMIDE AND ALBUTEROL SULFATE 3 ML: .5; 3 SOLUTION RESPIRATORY (INHALATION) at 15:49

## 2019-06-06 RX ADMIN — ENOXAPARIN SODIUM 30 MG: 100 INJECTION SUBCUTANEOUS at 17:18

## 2019-06-06 ASSESSMENT — ENCOUNTER SYMPTOMS
COUGH: 0
FEVER: 0
CHILLS: 0
VOMITING: 0
CONSTIPATION: 0
WHEEZING: 0
DIARRHEA: 1
SHORTNESS OF BREATH: 0
HEADACHES: 0
NAUSEA: 0

## 2019-06-06 NOTE — CARE PLAN
Problem: Communication  Goal: The ability to communicate needs accurately and effectively will improve  Outcome: PROGRESSING AS EXPECTED  Pt able to communicate with nursing staff. Using call light for assistance frequently throughout shift.

## 2019-06-06 NOTE — PROGRESS NOTES
2 RN skin check complete with: Melissa RN  Devices in place: Oxygen tubing (nasal cannula), heel floating boots, tele monitor, peripheral IV  Skin assessed under devices: slight redness noted to left ear, blanchable  Wound consult placed: yes  The following interventions in place: repositioning Q2 hours, waffle mattress, heel floating boots, moisture barrier cream, interdry.

## 2019-06-06 NOTE — CARE PLAN
Problem: Safety  Goal: Will remain free from injury  Outcome: PROGRESSING AS EXPECTED  Pt has remained free from injury. Continuing to reposition frequently and use barrier cream along with interdry and heel offloading boots.

## 2019-06-06 NOTE — PROGRESS NOTES
Placed PT evaluation and  consult.  Pt is living at home alone and states that she receives help from friends, but I do not think she's getting adequate care/help at home.

## 2019-06-07 LAB
ALBUMIN SERPL BCP-MCNC: 3 G/DL (ref 3.2–4.9)
BUN SERPL-MCNC: 16 MG/DL (ref 8–22)
CALCIUM SERPL-MCNC: 8.7 MG/DL (ref 8.5–10.5)
CHLORIDE SERPL-SCNC: 102 MMOL/L (ref 96–112)
CO2 SERPL-SCNC: 33 MMOL/L (ref 20–33)
CREAT SERPL-MCNC: 0.94 MG/DL (ref 0.5–1.4)
ERYTHROCYTE [DISTWIDTH] IN BLOOD BY AUTOMATED COUNT: 52.4 FL (ref 35.9–50)
GLUCOSE SERPL-MCNC: 93 MG/DL (ref 65–99)
HCT VFR BLD AUTO: 45.6 % (ref 37–47)
HGB BLD-MCNC: 13.9 G/DL (ref 12–16)
MCH RBC QN AUTO: 32.6 PG (ref 27–33)
MCHC RBC AUTO-ENTMCNC: 30.5 G/DL (ref 33.6–35)
MCV RBC AUTO: 107 FL (ref 81.4–97.8)
PHOSPHATE SERPL-MCNC: 1.8 MG/DL (ref 2.5–4.5)
PLATELET # BLD AUTO: 120 K/UL (ref 164–446)
PMV BLD AUTO: 10.3 FL (ref 9–12.9)
POTASSIUM SERPL-SCNC: 3.9 MMOL/L (ref 3.6–5.5)
RBC # BLD AUTO: 4.26 M/UL (ref 4.2–5.4)
SODIUM SERPL-SCNC: 139 MMOL/L (ref 135–145)
WBC # BLD AUTO: 6.4 K/UL (ref 4.8–10.8)

## 2019-06-07 PROCEDURE — 700102 HCHG RX REV CODE 250 W/ 637 OVERRIDE(OP): Performed by: HOSPITALIST

## 2019-06-07 PROCEDURE — 700111 HCHG RX REV CODE 636 W/ 250 OVERRIDE (IP): Performed by: HOSPITALIST

## 2019-06-07 PROCEDURE — 94760 N-INVAS EAR/PLS OXIMETRY 1: CPT

## 2019-06-07 PROCEDURE — 85027 COMPLETE CBC AUTOMATED: CPT

## 2019-06-07 PROCEDURE — 80069 RENAL FUNCTION PANEL: CPT

## 2019-06-07 PROCEDURE — 770020 HCHG ROOM/CARE - TELE (206)

## 2019-06-07 PROCEDURE — 700101 HCHG RX REV CODE 250: Performed by: HOSPITALIST

## 2019-06-07 PROCEDURE — 36415 COLL VENOUS BLD VENIPUNCTURE: CPT

## 2019-06-07 PROCEDURE — A9270 NON-COVERED ITEM OR SERVICE: HCPCS | Performed by: HOSPITALIST

## 2019-06-07 PROCEDURE — 97162 PT EVAL MOD COMPLEX 30 MIN: CPT

## 2019-06-07 PROCEDURE — 94640 AIRWAY INHALATION TREATMENT: CPT

## 2019-06-07 PROCEDURE — 99232 SBSQ HOSP IP/OBS MODERATE 35: CPT | Performed by: HOSPITALIST

## 2019-06-07 RX ADMIN — CEPHALEXIN 500 MG: 500 CAPSULE ORAL at 17:51

## 2019-06-07 RX ADMIN — CEPHALEXIN 500 MG: 500 CAPSULE ORAL at 23:52

## 2019-06-07 RX ADMIN — ENOXAPARIN SODIUM 30 MG: 100 INJECTION SUBCUTANEOUS at 17:51

## 2019-06-07 RX ADMIN — LEVOTHYROXINE SODIUM 137 MCG: 25 TABLET ORAL at 06:00

## 2019-06-07 RX ADMIN — CEPHALEXIN 500 MG: 500 CAPSULE ORAL at 06:00

## 2019-06-07 RX ADMIN — CEPHALEXIN 500 MG: 500 CAPSULE ORAL at 12:47

## 2019-06-07 RX ADMIN — CYANOCOBALAMIN TAB 500 MCG 1000 MCG: 500 TAB at 06:00

## 2019-06-07 RX ADMIN — IPRATROPIUM BROMIDE AND ALBUTEROL SULFATE 3 ML: .5; 3 SOLUTION RESPIRATORY (INHALATION) at 21:26

## 2019-06-07 RX ADMIN — CEPHALEXIN 500 MG: 500 CAPSULE ORAL at 00:38

## 2019-06-07 RX ADMIN — POTASSIUM CHLORIDE 40 MEQ: 1500 TABLET, EXTENDED RELEASE ORAL at 17:51

## 2019-06-07 RX ADMIN — ENOXAPARIN SODIUM 30 MG: 100 INJECTION SUBCUTANEOUS at 06:01

## 2019-06-07 RX ADMIN — POTASSIUM CHLORIDE 40 MEQ: 1500 TABLET, EXTENDED RELEASE ORAL at 06:01

## 2019-06-07 ASSESSMENT — ENCOUNTER SYMPTOMS
NERVOUS/ANXIOUS: 1
CHILLS: 0
CONSTIPATION: 0
VOMITING: 0
HEADACHES: 0
COUGH: 0
DIARRHEA: 1
FEVER: 0
NAUSEA: 0
SHORTNESS OF BREATH: 0
WHEEZING: 0

## 2019-06-07 ASSESSMENT — COGNITIVE AND FUNCTIONAL STATUS - GENERAL
SUGGESTED CMS G CODE MODIFIER MOBILITY: CN
MOVING TO AND FROM BED TO CHAIR: UNABLE
STANDING UP FROM CHAIR USING ARMS: TOTAL
TURNING FROM BACK TO SIDE WHILE IN FLAT BAD: UNABLE
CLIMB 3 TO 5 STEPS WITH RAILING: TOTAL
WALKING IN HOSPITAL ROOM: TOTAL
MOBILITY SCORE: 6
MOVING FROM LYING ON BACK TO SITTING ON SIDE OF FLAT BED: UNABLE

## 2019-06-07 ASSESSMENT — GAIT ASSESSMENTS: GAIT LEVEL OF ASSIST: UNABLE TO PARTICIPATE

## 2019-06-07 NOTE — PROGRESS NOTES
Patient has routine lasix scheduled. Clarified with Dr. Gonzalez that patient's blood pressures run in the 90's. Verbal orders to hold morning lasix.

## 2019-06-07 NOTE — RESPIRATORY CARE
COPD EDUCATION by COPD CLINICAL EDUCATOR  6/7/2019 at 3:06 PM by Cynthia Ashley     Patient interviewed by COPD education team. Patient visited multiple times but kept asking me to come back later then refusing. A comprehensive packet including information about COPD, treatments, and smoking cessation given.

## 2019-06-07 NOTE — CARE PLAN
Problem: Pain Management  Goal: Pain level will decrease to patient's comfort goal  Denies pain. Repositioned for comfort. Resting comfortably, no distress.     Problem: Mobility  Goal: Risk for activity intolerance will decrease  Outcome: PROGRESSING SLOWER THAN EXPECTED  Independent at home. Pt was able to stand at the bedside for a short period of time with PT. Pt assisted with using the bedpan. Will continue to monitor and encourage physical activity.     Problem: Skin Integrity  Goal: Risk for impaired skin integrity will decrease  Outcome: PROGRESSING AS EXPECTED  Multiple loose stools. Bottom is red and irritated. Barrier paste applied. Pt assisted with repositioning. Pt is on specialty bed. Boots applied. Skin cleansed. Pt is on antibiotics.

## 2019-06-07 NOTE — PROGRESS NOTES
Received report from day shift RN. Patient is A&Ox4, no complaints of pain, resting comfortably in bed, no signs of distress. All questions and concerns answered, bed in lowest and locked position, call light in reach, will continue to monitor.

## 2019-06-07 NOTE — CARE PLAN
Problem: Communication  Goal: The ability to communicate needs accurately and effectively will improve    Intervention: Educate patient and significant other/support system about the plan of care, procedures, treatments, medications and allow for questions  Educated patient to verbalize questions and concerns regarding plan of care. Patient verbalizes understanding.      Problem: Safety  Goal: Will remain free from falls  Safety precautions and fall prevention in place. Fall prevention education provided. Patient verbalized understanding. Bed in low locked position, bed alarm on, treaded socks on patient, call bell within reach. Patient calls appropriately as needed.

## 2019-06-07 NOTE — PROGRESS NOTES
[facility] Medicine Daily Progress Note    Date of Service  6/7/2019    Chief Complaint  68 y.o. female admitted 6/5/2019 with Cellulitis and possibly pneumonia    Interval Problem Update  6/5: Admitted and levothyroxine increased.  Still having soft blood pressures although patient says this is normal for her.  Confirmed the patient does have RT protocol so she can get nebulizations   Repleting B12 IM and then orally  6/6: Had some diarrhea after suppository.  Changed stool softener to PRN.  Adding humidifier.  Changed to PO Keflex.  6/7: Continues to have low blood pressures making Lasix administration difficult.  Still continues to be weak, referred to SNF after discussion with patient.    Disposition  Referred to SNF    Review of Systems  Review of Systems   Constitutional: Positive for malaise/fatigue. Negative for chills and fever.   HENT:        Dry nose   Respiratory: Negative for cough, shortness of breath and wheezing.    Cardiovascular: Negative for chest pain.   Gastrointestinal: Positive for diarrhea. Negative for constipation, nausea and vomiting.   Genitourinary: Negative for dysuria.   Neurological: Negative for headaches.   Psychiatric/Behavioral: The patient is nervous/anxious.         Physical Exam  Temp:  [36 °C (96.8 °F)-36.5 °C (97.7 °F)] 36 °C (96.8 °F)  Pulse:  [59-67] 67  Resp:  [17-22] 22  BP: (84-99)/(52-63) 94/63  SpO2:  [92 %-96 %] 93 %    Physical Exam   Constitutional: She appears well-developed.   Morbidly obese   HENT:   Head: Normocephalic.   Eyes: Conjunctivae are normal.   Cardiovascular: Normal rate.  Exam reveals no gallop.    Pulmonary/Chest: No respiratory distress. She has no wheezes.   Abdominal: She exhibits no distension. There is no tenderness.   Musculoskeletal: She exhibits edema.   Neurological: She is alert.   Skin: Skin is warm. There is erythema.       Fluids    Intake/Output Summary (Last 24 hours) at 06/07/19 1426  Last data filed at 06/07/19 1000   Gross per 24  hour   Intake              330 ml   Output              400 ml   Net              -70 ml       Laboratory  Recent Labs      06/05/19   0141  06/06/19   0259  06/07/19   0535   WBC  6.1  9.3  6.4   RBC  4.48  4.34  4.26   HEMOGLOBIN  14.7  14.0  13.9   HEMATOCRIT  46.1  45.3  45.6   MCV  102.9*  104.4*  107.0*   MCH  32.8  32.3  32.6   MCHC  31.9*  30.9*  30.5*   RDW  50.2*  50.0  52.4*   PLATELETCT  133*  119*  120*   MPV  10.1  10.5  10.3     Recent Labs      06/05/19   0141  06/06/19   0259  06/07/19   0535   SODIUM  139  138  139   POTASSIUM  3.1*  3.5*  3.9   CHLORIDE  99  100  102   CO2  32  32  33   GLUCOSE  132*  126*  93   BUN  21  19  16   CREATININE  1.00  0.95  0.94   CALCIUM  9.3  8.7  8.7         Recent Labs      06/05/19   0141   BNPBTYPENAT  26           Imaging  EC-ECHOCARDIOGRAM COMPLETE W/O CONT   Final Result      US-EXTREMITY VENOUS LOWER UNILAT LEFT   Final Result      DX-CHEST-LIMITED (1 VIEW)   Final Result         Persistent bibasilar consolidation, right greater than left with probable associated pleural fluid.      OUTSIDE IMAGES-DX CHEST   Final Result           Assessment/Plan  Hyperglycemia   Assessment & Plan    HbA1c 5.5  We will manage insulin levels     Macrocytosis   Assessment & Plan    B12 low at 237 folate unremarkable  Repleting B12 IM and then orally     Hypokalemia   Assessment & Plan    Replete with oral potassium chloride 40 mEq twice daily and monitor levels  Magnesium normal     Simple chronic bronchitis (HCC)   Assessment & Plan    No signs of acute exacerbation   bronchodilators per RT protocol     Hypertension   Assessment & Plan    We will hold her nadolol at this time given her low blood pressure prior to transfer  Monitor blood pressure and adjust accordingly     Other specified hypothyroidism   Assessment & Plan    We will increase her levothyroxine to 137 MCG she will need recheck TFTs in 6 to 8 weeks     Pneumonia ruled out   Assessment & Plan    Possible  pneumonia was initially suspected  Initially placed on IV ceftriaxone for her cellulitis and pneumonia and on azithromycin  Procalcitonin negative     Lower extremity edema   Assessment & Plan    Given her asymmetric edema we will check left lower extremity duplex  Echo unremarkable  We will start on IV Lasix and monitor intake and output     Acute on chronic respiratory failure with hypoxia (HCC)   Assessment & Plan    Oxygen protocol     Class 3 severe obesity due to excess calories with serious comorbidity and body mass index (BMI) of 45.0 to 49.9 in adult (HCC)   Assessment & Plan    Body mass index is 49.81 kg/m².      Cellulitis and abscess of left lower extremity   Assessment & Plan    Will start on IV ceftriaxone  Close clinical monitoring          VTE prophylaxis: enoxaparin

## 2019-06-07 NOTE — PROGRESS NOTES
Hospital Medicine Daily Progress Note    Date of Service  6/6/2019    Chief Complaint  68 y.o. female admitted 6/5/2019 with Cellulitis and possibly pneumonia    Interval Problem Update  6/5: Admitted and levothyroxine increased.  Still having soft blood pressures although patient says this is normal for her.  Confirmed the patient does have RT protocol so she can get nebulizations   Repleting B12 IM and then orally  6/6: Had some diarrhea after suppository.  Changed stool softener to PRN.  Adding humidifier.  Changed to PO Keflex.    Disposition  Pending improvement of cellulitis and possibly pneumonia    Review of Systems  Review of Systems   Constitutional: Negative for chills and fever.   HENT:        Dry nose   Respiratory: Negative for cough, shortness of breath and wheezing.    Cardiovascular: Negative for chest pain.   Gastrointestinal: Positive for diarrhea. Negative for constipation, nausea and vomiting.   Genitourinary: Negative for dysuria.   Neurological: Negative for headaches.        Physical Exam  Temp:  [36 °C (96.8 °F)-37 °C (98.6 °F)] 36.1 °C (97 °F)  Pulse:  [] 59  Resp:  [17-20] 20  BP: ()/(46-57) 99/53  SpO2:  [91 %-96 %] 96 %    Physical Exam   Constitutional: She appears well-developed.   HENT:   Head: Normocephalic.   Eyes: Conjunctivae are normal.   Cardiovascular: Normal rate.  Exam reveals no gallop.    Pulmonary/Chest: No respiratory distress. She has no wheezes.   Abdominal: She exhibits no distension. There is no tenderness.   Musculoskeletal: She exhibits edema.   Neurological: She is alert.   Skin: There is erythema.       Fluids    Intake/Output Summary (Last 24 hours) at 06/06/19 8784  Last data filed at 06/06/19 1230   Gross per 24 hour   Intake              100 ml   Output              150 ml   Net              -50 ml       Laboratory  Recent Labs      06/05/19   0141  06/06/19   0259   WBC  6.1  9.3   RBC  4.48  4.34   HEMOGLOBIN  14.7  14.0   HEMATOCRIT  46.1  45.3    MCV  102.9*  104.4*   MCH  32.8  32.3   MCHC  31.9*  30.9*   RDW  50.2*  50.0   PLATELETCT  133*  119*   MPV  10.1  10.5     Recent Labs      06/05/19   0141  06/06/19   0259   SODIUM  139  138   POTASSIUM  3.1*  3.5*   CHLORIDE  99  100   CO2  32  32   GLUCOSE  132*  126*   BUN  21  19   CREATININE  1.00  0.95   CALCIUM  9.3  8.7         Recent Labs      06/05/19   0141   BNPBTYPENAT  26           Imaging  EC-ECHOCARDIOGRAM COMPLETE W/O CONT   Final Result      US-EXTREMITY VENOUS LOWER UNILAT LEFT   Final Result      DX-CHEST-LIMITED (1 VIEW)   Final Result         Persistent bibasilar consolidation, right greater than left with probable associated pleural fluid.      OUTSIDE IMAGES-DX CHEST   Final Result           Assessment/Plan  Hyperglycemia   Assessment & Plan    HbA1c 5.5  We will manage insulin levels     Macrocytosis   Assessment & Plan    B12 low at 237 folate unremarkable  Repleting B12 IM and then orally     Hypokalemia   Assessment & Plan    Replete with oral potassium chloride 40 mEq twice daily and monitor levels  Magnesium normal     Simple chronic bronchitis (HCC)   Assessment & Plan    No signs of acute exacerbation   bronchodilators per RT protocol     Hypertension   Assessment & Plan    We will hold her nadolol at this time given her low blood pressure prior to transfer  Monitor blood pressure and adjust accordingly     Other specified hypothyroidism   Assessment & Plan    We will increase her levothyroxine to 137 MCG she will need recheck TFTs in 6 to 8 weeks     Pneumonia due to infectious organism   Assessment & Plan    Possible pneumonia was initially suspected  Initially placed on IV ceftriaxone for her cellulitis and pneumonia and on azithromycin  Procalcitonin negative     Lower extremity edema   Assessment & Plan    Given her asymmetric edema we will check left lower extremity duplex  Echo unremarkable  We will start on IV Lasix and monitor intake and output     Acute on chronic  respiratory failure with hypoxia (HCC)   Assessment & Plan    Oxygen protocol     Class 3 severe obesity due to excess calories with serious comorbidity and body mass index (BMI) of 45.0 to 49.9 in adult ([facility])   Assessment & Plan    Body mass index is 49.81 kg/m².      Cellulitis and abscess of left lower extremity   Assessment & Plan    Will start on IV ceftriaxone  Close clinical monitoring          VTE prophylaxis: enoxaparin

## 2019-06-08 LAB
ALBUMIN SERPL BCP-MCNC: 2.9 G/DL (ref 3.2–4.9)
BUN SERPL-MCNC: 13 MG/DL (ref 8–22)
CALCIUM SERPL-MCNC: 8.8 MG/DL (ref 8.5–10.5)
CHLORIDE SERPL-SCNC: 102 MMOL/L (ref 96–112)
CO2 SERPL-SCNC: 31 MMOL/L (ref 20–33)
CREAT SERPL-MCNC: 0.94 MG/DL (ref 0.5–1.4)
EKG IMPRESSION: NORMAL
ERYTHROCYTE [DISTWIDTH] IN BLOOD BY AUTOMATED COUNT: 51.3 FL (ref 35.9–50)
GLUCOSE SERPL-MCNC: 98 MG/DL (ref 65–99)
HCT VFR BLD AUTO: 45.9 % (ref 37–47)
HGB BLD-MCNC: 14.1 G/DL (ref 12–16)
MCH RBC QN AUTO: 32.3 PG (ref 27–33)
MCHC RBC AUTO-ENTMCNC: 30.7 G/DL (ref 33.6–35)
MCV RBC AUTO: 105.3 FL (ref 81.4–97.8)
PHOSPHATE SERPL-MCNC: 1.4 MG/DL (ref 2.5–4.5)
PLATELET # BLD AUTO: 116 K/UL (ref 164–446)
PMV BLD AUTO: 10.3 FL (ref 9–12.9)
POTASSIUM SERPL-SCNC: 4 MMOL/L (ref 3.6–5.5)
RBC # BLD AUTO: 4.36 M/UL (ref 4.2–5.4)
SODIUM SERPL-SCNC: 136 MMOL/L (ref 135–145)
WBC # BLD AUTO: 6.2 K/UL (ref 4.8–10.8)

## 2019-06-08 PROCEDURE — 94640 AIRWAY INHALATION TREATMENT: CPT

## 2019-06-08 PROCEDURE — A9270 NON-COVERED ITEM OR SERVICE: HCPCS | Performed by: HOSPITALIST

## 2019-06-08 PROCEDURE — 93010 ELECTROCARDIOGRAM REPORT: CPT | Performed by: INTERNAL MEDICINE

## 2019-06-08 PROCEDURE — 700101 HCHG RX REV CODE 250: Performed by: HOSPITALIST

## 2019-06-08 PROCEDURE — 700105 HCHG RX REV CODE 258: Performed by: HOSPITALIST

## 2019-06-08 PROCEDURE — 99232 SBSQ HOSP IP/OBS MODERATE 35: CPT | Performed by: HOSPITALIST

## 2019-06-08 PROCEDURE — 700102 HCHG RX REV CODE 250 W/ 637 OVERRIDE(OP): Performed by: HOSPITALIST

## 2019-06-08 PROCEDURE — 85027 COMPLETE CBC AUTOMATED: CPT

## 2019-06-08 PROCEDURE — 93005 ELECTROCARDIOGRAM TRACING: CPT | Performed by: HOSPITALIST

## 2019-06-08 PROCEDURE — 36415 COLL VENOUS BLD VENIPUNCTURE: CPT

## 2019-06-08 PROCEDURE — 770020 HCHG ROOM/CARE - TELE (206)

## 2019-06-08 PROCEDURE — 80069 RENAL FUNCTION PANEL: CPT

## 2019-06-08 PROCEDURE — 700111 HCHG RX REV CODE 636 W/ 250 OVERRIDE (IP): Performed by: HOSPITALIST

## 2019-06-08 RX ORDER — FUROSEMIDE 20 MG/1
20 TABLET ORAL
Status: DISCONTINUED | OUTPATIENT
Start: 2019-06-09 | End: 2019-06-10 | Stop reason: HOSPADM

## 2019-06-08 RX ORDER — SODIUM CHLORIDE 9 MG/ML
250 INJECTION, SOLUTION INTRAVENOUS ONCE
Status: COMPLETED | OUTPATIENT
Start: 2019-06-08 | End: 2019-06-08

## 2019-06-08 RX ADMIN — CYANOCOBALAMIN TAB 500 MCG 1000 MCG: 500 TAB at 09:23

## 2019-06-08 RX ADMIN — IPRATROPIUM BROMIDE AND ALBUTEROL SULFATE 3 ML: .5; 3 SOLUTION RESPIRATORY (INHALATION) at 20:47

## 2019-06-08 RX ADMIN — LEVOTHYROXINE SODIUM 137 MCG: 25 TABLET ORAL at 09:22

## 2019-06-08 RX ADMIN — SODIUM PHOSPHATE, MONOBASIC, MONOHYDRATE AND SODIUM PHOSPHATE, DIBASIC, ANHYDROUS 15 MMOL: 276; 142 INJECTION, SOLUTION INTRAVENOUS at 14:54

## 2019-06-08 RX ADMIN — CEPHALEXIN 500 MG: 500 CAPSULE ORAL at 17:26

## 2019-06-08 RX ADMIN — SODIUM PHOSPHATE, MONOBASIC, MONOHYDRATE AND SODIUM PHOSPHATE, DIBASIC, ANHYDROUS 30 MMOL: 276; 142 INJECTION, SOLUTION INTRAVENOUS at 14:52

## 2019-06-08 RX ADMIN — SODIUM CHLORIDE 250 ML: 9 INJECTION, SOLUTION INTRAVENOUS at 06:41

## 2019-06-08 RX ADMIN — ENOXAPARIN SODIUM 30 MG: 100 INJECTION SUBCUTANEOUS at 05:55

## 2019-06-08 RX ADMIN — POTASSIUM CHLORIDE 40 MEQ: 1500 TABLET, EXTENDED RELEASE ORAL at 09:21

## 2019-06-08 RX ADMIN — CEPHALEXIN 500 MG: 500 CAPSULE ORAL at 23:02

## 2019-06-08 RX ADMIN — CEPHALEXIN 500 MG: 500 CAPSULE ORAL at 12:08

## 2019-06-08 RX ADMIN — POTASSIUM CHLORIDE 40 MEQ: 1500 TABLET, EXTENDED RELEASE ORAL at 17:26

## 2019-06-08 RX ADMIN — CEPHALEXIN 500 MG: 500 CAPSULE ORAL at 05:56

## 2019-06-08 RX ADMIN — ENOXAPARIN SODIUM 30 MG: 100 INJECTION SUBCUTANEOUS at 17:26

## 2019-06-08 ASSESSMENT — ENCOUNTER SYMPTOMS
DIARRHEA: 1
FEVER: 0
CHILLS: 0
COUGH: 0
NAUSEA: 0
NERVOUS/ANXIOUS: 1
HEADACHES: 0
WHEEZING: 0
CONSTIPATION: 0
VOMITING: 0
SHORTNESS OF BREATH: 0

## 2019-06-08 NOTE — CARE PLAN
Problem: Safety  Goal: Will remain free from injury  Outcome: PROGRESSING AS EXPECTED  Fall precautions in place.     Problem: Respiratory:  Goal: Respiratory status will improve  Outcome: PROGRESSING AS EXPECTED  Pt is on 4L O2; she wears 3L O2 at home. PRN nebulizer treatments available.

## 2019-06-08 NOTE — PROGRESS NOTES
This RN received call from monitor room at 1220 that pt appeared to be in Afib. EKG ordered. EKG completed 0040 showing A Fib, which is new for pt. Dr. oGnzalez called regarding A Fib. Pt is controlled A Fib in 80's-90's. Dr. Gonzalez did not want any new orders at this time but to notify her if pt becomes uncontrolled in her A fib.    Pt back to SR at 0130, rate in the 60's-70's.     Chanell Gutierrez R.N.

## 2019-06-08 NOTE — PROGRESS NOTES
Dr. Gonzalez made aware of pt reoccurring hypotension.  Vitals:    06/08/19 0600   BP: (!) 75/42   Pulse:    Resp:    Temp:    SpO2:    MD to order 250cc fluid bolus.   Pt still asymptomatic with hypotension.     Chanell Gutierrez R.N.

## 2019-06-08 NOTE — CARE PLAN
Problem: Safety  Goal: Will remain free from falls  Outcome: PROGRESSING SLOWER THAN EXPECTED  Generalized weakness. Tolerates activity poorly. Fall precautions in place. Pt educated on the importance of using the call bell every time she needs assistance. Pt demonstrated understanding.     Problem: Infection  Goal: Will remain free from infection  Outcome: PROGRESSING AS EXPECTED   Mild cellulitis left lower leg. Pt is currently on PO antibiotics.    Problem: Pain Management  Goal: Pain level will decrease to patient's comfort goal  Outcome: PROGRESSING AS EXPECTED  Pt repositioned. Denies pain. Resting comfortably, no distress.     Problem: Skin Integrity  Goal: Risk for impaired skin integrity will decrease  Outcome: PROGRESSING AS EXPECTED  Redness in abdominal folds. Interdry placed. Pt repositioned q2h. Barrier cream applied. Will continue to monitor for skin breakdown.
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
## 2019-06-08 NOTE — PROGRESS NOTES
Cardiac Monitoring Report:  Rhythm: intermittently switched from SR to A Fib  High: 85  Low: 64  MS: NA  / QRS: 0.08  / QT: NA    Chanell Gutierrez R.N.

## 2019-06-08 NOTE — PROGRESS NOTES
Pt is refusing to be repositioned off her right side. Pt was educated on the importance of repositioning and guarding against pressure ulcers. Pt was educated then educated again. Pt verbalized understanding but still refuses. Will try again later.

## 2019-06-08 NOTE — PROGRESS NOTES
Report completed at pt bedside. This RN resumed care of patient.   Pt is alert and oriented x4 upon arrival. Pt does not have any complaints of pain at this time. Pt does have some mild SOB, which she receives PRN Nebulizer treatments for. Pt is fall risk and skin risk. Pt turned q2h.     Chanell Gutierrez R.N.

## 2019-06-08 NOTE — PROGRESS NOTES
2 RN skin check completed with KENZIE Cota.   Devices in place oxygen tubing, heel float boots.  Skin assessed under devices red, blanching, and intact.  Confirmed pressure ulcers found on NA.  New potential pressure ulcers noted on NA. Wound consult placed for LLE cellulitis.  The following interventions in place barrier cream, barrier paste, q2h turns, silicone oxygen tubing.  Back flaky and dry, skin intact.  Abdominal fold has some redness; interdry in place.  LLE red and edematous.    Chanell Gutierrez R.N.

## 2019-06-08 NOTE — PROGRESS NOTES
[facility] Medicine Daily Progress Note    Date of Service  6/8/2019    Chief Complaint  68 y.o. female admitted 6/5/2019 with Cellulitis and possibly pneumonia    Interval Problem Update  6/5: Admitted and levothyroxine increased.  Still having soft blood pressures although patient says this is normal for her.  Confirmed the patient does have RT protocol so she can get nebulizations   Repleting B12 IM and then orally  6/6: Had some diarrhea after suppository.  Changed stool softener to PRN.  Adding humidifier.  Changed to PO Keflex.  6/7: Continues to have low blood pressures making Lasix administration difficult.  Still continues to be weak, referred to SNF after discussion with patient.  6/8: Congested and tired.  Still hypotensive.  Needed 250 mL bolus.  Changed Lasix to oral and lower dose.  Cortisol unremarkable.    Disposition  Referred to SNF    Review of Systems  Review of Systems   Constitutional: Positive for malaise/fatigue. Negative for chills and fever.   HENT: Positive for congestion.         Dry nose   Respiratory: Negative for cough, shortness of breath and wheezing.    Cardiovascular: Negative for chest pain.   Gastrointestinal: Positive for diarrhea. Negative for constipation, nausea and vomiting.   Genitourinary: Negative for dysuria.   Neurological: Negative for headaches.   Psychiatric/Behavioral: The patient is nervous/anxious.         Physical Exam  Temp:  [36.4 °C (97.5 °F)-37.2 °C (99 °F)] 36.9 °C (98.5 °F)  Pulse:  [55-77] 70  Resp:  [16-20] 20  BP: ()/(42-60) 106/60  SpO2:  [92 %-98 %] 95 %    Physical Exam   Constitutional: She appears well-developed.   Morbidly obese   HENT:   Head: Normocephalic.   Eyes: Conjunctivae are normal.   Cardiovascular: Normal rate.  Exam reveals no gallop.    Pulmonary/Chest: No respiratory distress.   Congested   Abdominal: She exhibits no distension. There is no tenderness.   Musculoskeletal: She exhibits edema.   Neurological: She is alert.   Skin:  Skin is warm. There is erythema.       Fluids    Intake/Output Summary (Last 24 hours) at 06/08/19 1432  Last data filed at 06/07/19 2100   Gross per 24 hour   Intake              250 ml   Output              400 ml   Net             -150 ml       Laboratory  Recent Labs      06/06/19   0259  06/07/19   0535  06/08/19   0234   WBC  9.3  6.4  6.2   RBC  4.34  4.26  4.36   HEMOGLOBIN  14.0  13.9  14.1   HEMATOCRIT  45.3  45.6  45.9   MCV  104.4*  107.0*  105.3*   MCH  32.3  32.6  32.3   MCHC  30.9*  30.5*  30.7*   RDW  50.0  52.4*  51.3*   PLATELETCT  119*  120*  116*   MPV  10.5  10.3  10.3     Recent Labs      06/06/19 0259 06/07/19   0535  06/08/19   0234   SODIUM  138  139  136   POTASSIUM  3.5*  3.9  4.0   CHLORIDE  100  102  102   CO2  32  33  31   GLUCOSE  126*  93  98   BUN  19  16  13   CREATININE  0.95  0.94  0.94   CALCIUM  8.7  8.7  8.8                   Imaging  EC-ECHOCARDIOGRAM COMPLETE W/O CONT   Final Result      US-EXTREMITY VENOUS LOWER UNILAT LEFT   Final Result      DX-CHEST-LIMITED (1 VIEW)   Final Result         Persistent bibasilar consolidation, right greater than left with probable associated pleural fluid.      OUTSIDE IMAGES-DX CHEST   Final Result           Assessment/Plan  Vitamin B12 deficiency- (present on admission)   Assessment & Plan    Repleting IM and then orally     Hyperglycemia   Assessment & Plan    HbA1c 5.5  We will manage insulin levels     Macrocytosis   Assessment & Plan    B12 low at 237 folate unremarkable  Repleting B12 IM and then orally     Hypokalemia   Assessment & Plan    Replete with oral potassium chloride 40 mEq twice daily and monitor levels  Magnesium normal     Simple chronic bronchitis (HCC)   Assessment & Plan    No signs of acute exacerbation   bronchodilators per RT protocol     Hypertension   Assessment & Plan     hold her nadolol at this time given her low blood pressure  Monitor blood pressure and adjust accordingly     Other specified  hypothyroidism   Assessment & Plan    We will increase her levothyroxine to 137 MCG she will need recheck TFTs in 6 to 8 weeks     Pneumonia ruled out   Assessment & Plan    Possible pneumonia was initially suspected  Initially placed on IV ceftriaxone for her cellulitis and pneumonia and on azithromycin  Procalcitonin negative     Lower extremity edema   Assessment & Plan    Given her asymmetric edema we will check left lower extremity duplex  Echo unremarkable  We will start on IV Lasix and monitor intake and output     Acute on chronic respiratory failure with hypoxia ([facility])   Assessment & Plan    Oxygen protocol     Class 3 severe obesity due to excess calories with serious comorbidity and body mass index (BMI) of 45.0 to 49.9 in adult ([facility])   Assessment & Plan    Body mass index is 49.81 kg/m².      Cellulitis and abscess of left lower extremity   Assessment & Plan    Will start on IV ceftriaxone  Close clinical monitoring          VTE prophylaxis: enoxaparin

## 2019-06-08 NOTE — DISCHARGE PLANNING
Received Choice form at 9608  Agency/Facility Name: 1. [facility] 2. [facility]   Referral sent per Choice form @ 3018

## 2019-06-08 NOTE — PROGRESS NOTES
Pt blood pressure low at 0426 76/52. Pt is asymptomatic. Dr. Gonzalez made aware. MD stated to hold scheduled Lasix this AM and repeat blood pressure with pt laying on her back.     Chanell Gutierrez R.N.

## 2019-06-09 LAB
ALBUMIN SERPL BCP-MCNC: 3 G/DL (ref 3.2–4.9)
BUN SERPL-MCNC: 9 MG/DL (ref 8–22)
C DIFF DNA SPEC QL NAA+PROBE: NEGATIVE
C DIFF TOX GENS STL QL NAA+PROBE: NEGATIVE
CALCIUM SERPL-MCNC: 8.6 MG/DL (ref 8.5–10.5)
CHLORIDE SERPL-SCNC: 105 MMOL/L (ref 96–112)
CO2 SERPL-SCNC: 26 MMOL/L (ref 20–33)
CREAT SERPL-MCNC: 0.77 MG/DL (ref 0.5–1.4)
ERYTHROCYTE [DISTWIDTH] IN BLOOD BY AUTOMATED COUNT: 52.1 FL (ref 35.9–50)
GLUCOSE SERPL-MCNC: 78 MG/DL (ref 65–99)
HCT VFR BLD AUTO: 46.3 % (ref 37–47)
HGB BLD-MCNC: 13.9 G/DL (ref 12–16)
MCH RBC QN AUTO: 32.6 PG (ref 27–33)
MCHC RBC AUTO-ENTMCNC: 30 G/DL (ref 33.6–35)
MCV RBC AUTO: 108.7 FL (ref 81.4–97.8)
PHOSPHATE SERPL-MCNC: 2.4 MG/DL (ref 2.5–4.5)
PLATELET # BLD AUTO: 119 K/UL (ref 164–446)
PMV BLD AUTO: 9.9 FL (ref 9–12.9)
POTASSIUM SERPL-SCNC: 4.3 MMOL/L (ref 3.6–5.5)
RBC # BLD AUTO: 4.26 M/UL (ref 4.2–5.4)
SODIUM SERPL-SCNC: 138 MMOL/L (ref 135–145)
WBC # BLD AUTO: 5.1 K/UL (ref 4.8–10.8)

## 2019-06-09 PROCEDURE — 99232 SBSQ HOSP IP/OBS MODERATE 35: CPT | Performed by: HOSPITALIST

## 2019-06-09 PROCEDURE — 80069 RENAL FUNCTION PANEL: CPT

## 2019-06-09 PROCEDURE — 93005 ELECTROCARDIOGRAM TRACING: CPT | Performed by: HOSPITALIST

## 2019-06-09 PROCEDURE — A9270 NON-COVERED ITEM OR SERVICE: HCPCS | Performed by: HOSPITALIST

## 2019-06-09 PROCEDURE — 85027 COMPLETE CBC AUTOMATED: CPT

## 2019-06-09 PROCEDURE — 700102 HCHG RX REV CODE 250 W/ 637 OVERRIDE(OP): Performed by: HOSPITALIST

## 2019-06-09 PROCEDURE — 700111 HCHG RX REV CODE 636 W/ 250 OVERRIDE (IP): Performed by: HOSPITALIST

## 2019-06-09 PROCEDURE — 700101 HCHG RX REV CODE 250: Performed by: HOSPITALIST

## 2019-06-09 PROCEDURE — 93010 ELECTROCARDIOGRAM REPORT: CPT | Performed by: INTERNAL MEDICINE

## 2019-06-09 PROCEDURE — 94640 AIRWAY INHALATION TREATMENT: CPT

## 2019-06-09 PROCEDURE — 94760 N-INVAS EAR/PLS OXIMETRY 1: CPT

## 2019-06-09 PROCEDURE — 700105 HCHG RX REV CODE 258: Performed by: HOSPITALIST

## 2019-06-09 PROCEDURE — 36415 COLL VENOUS BLD VENIPUNCTURE: CPT

## 2019-06-09 PROCEDURE — 770020 HCHG ROOM/CARE - TELE (206)

## 2019-06-09 PROCEDURE — 87493 C DIFF AMPLIFIED PROBE: CPT

## 2019-06-09 RX ORDER — SODIUM CHLORIDE 9 MG/ML
500 INJECTION, SOLUTION INTRAVENOUS ONCE
Status: COMPLETED | OUTPATIENT
Start: 2019-06-09 | End: 2019-06-09

## 2019-06-09 RX ORDER — LOPERAMIDE HYDROCHLORIDE 2 MG/1
2 CAPSULE ORAL 4 TIMES DAILY PRN
Status: DISCONTINUED | OUTPATIENT
Start: 2019-06-09 | End: 2019-06-10 | Stop reason: HOSPADM

## 2019-06-09 RX ORDER — DILTIAZEM HYDROCHLORIDE 5 MG/ML
10 INJECTION INTRAVENOUS ONCE
Status: COMPLETED | OUTPATIENT
Start: 2019-06-09 | End: 2019-06-09

## 2019-06-09 RX ORDER — IPRATROPIUM BROMIDE AND ALBUTEROL SULFATE 2.5; .5 MG/3ML; MG/3ML
3 SOLUTION RESPIRATORY (INHALATION)
Status: DISCONTINUED | OUTPATIENT
Start: 2019-06-09 | End: 2019-06-10 | Stop reason: HOSPADM

## 2019-06-09 RX ADMIN — ENOXAPARIN SODIUM 30 MG: 100 INJECTION SUBCUTANEOUS at 06:05

## 2019-06-09 RX ADMIN — IPRATROPIUM BROMIDE AND ALBUTEROL SULFATE 3 ML: .5; 3 SOLUTION RESPIRATORY (INHALATION) at 12:47

## 2019-06-09 RX ADMIN — ALBUTEROL SULFATE 2 PUFF: 90 AEROSOL, METERED RESPIRATORY (INHALATION) at 09:19

## 2019-06-09 RX ADMIN — CEPHALEXIN 500 MG: 500 CAPSULE ORAL at 12:06

## 2019-06-09 RX ADMIN — CEPHALEXIN 500 MG: 500 CAPSULE ORAL at 06:14

## 2019-06-09 RX ADMIN — SODIUM CHLORIDE 500 ML: 9 INJECTION, SOLUTION INTRAVENOUS at 23:36

## 2019-06-09 RX ADMIN — POTASSIUM CHLORIDE 40 MEQ: 1500 TABLET, EXTENDED RELEASE ORAL at 06:07

## 2019-06-09 RX ADMIN — IPRATROPIUM BROMIDE AND ALBUTEROL SULFATE 3 ML: .5; 3 SOLUTION RESPIRATORY (INHALATION) at 18:23

## 2019-06-09 RX ADMIN — ENOXAPARIN SODIUM 30 MG: 100 INJECTION SUBCUTANEOUS at 16:57

## 2019-06-09 RX ADMIN — CEPHALEXIN 500 MG: 500 CAPSULE ORAL at 16:57

## 2019-06-09 RX ADMIN — SODIUM CHLORIDE 500 ML: 9 INJECTION, SOLUTION INTRAVENOUS at 20:59

## 2019-06-09 RX ADMIN — DILTIAZEM HYDROCHLORIDE 10 MG: 5 INJECTION INTRAVENOUS at 21:00

## 2019-06-09 RX ADMIN — POTASSIUM CHLORIDE 40 MEQ: 1500 TABLET, EXTENDED RELEASE ORAL at 16:58

## 2019-06-09 RX ADMIN — CYANOCOBALAMIN TAB 500 MCG 1000 MCG: 500 TAB at 09:19

## 2019-06-09 RX ADMIN — DILTIAZEM HYDROCHLORIDE 10 MG: 5 INJECTION INTRAVENOUS at 23:40

## 2019-06-09 RX ADMIN — FUROSEMIDE 20 MG: 20 TABLET ORAL at 06:11

## 2019-06-09 RX ADMIN — LEVOTHYROXINE SODIUM 137 MCG: 25 TABLET ORAL at 06:09

## 2019-06-09 RX ADMIN — CEPHALEXIN 500 MG: 500 CAPSULE ORAL at 23:05

## 2019-06-09 ASSESSMENT — ENCOUNTER SYMPTOMS
DIARRHEA: 1
CHILLS: 0
NAUSEA: 0
FEVER: 0
CONSTIPATION: 0
COUGH: 0
PALPITATIONS: 0
HEADACHES: 0
WHEEZING: 1
SHORTNESS OF BREATH: 1
NERVOUS/ANXIOUS: 1
VOMITING: 0

## 2019-06-09 NOTE — PROGRESS NOTES
Assumed care report received. Pt resting in bed no complaints of pain at this time. Slight complaint of SOB, requested inhaler. Expiratory wheezes heard upon ascultation, Inhaler Prn given. Mild work of breathing no sign of distress. Plan of care discussed no other concerns at this time. Call light within reach. Fall precautions in place.

## 2019-06-09 NOTE — PROGRESS NOTES
[facility] Medicine Daily Progress Note    Date of Service  6/9/2019    Chief Complaint  68 y.o. female admitted 6/5/2019 with Cellulitis and possibly pneumonia    Interval Problem Update  6/5: Admitted and levothyroxine increased.  Still having soft blood pressures although patient says this is normal for her.  Confirmed the patient does have RT protocol so she can get nebulizations   Repleting B12 IM and then orally  6/6: Had some diarrhea after suppository.  Changed stool softener to PRN.  Adding humidifier.  Changed to PO Keflex.  6/7: Continues to have low blood pressures making Lasix administration difficult.  Still continues to be weak, referred to SNF after discussion with patient.  6/8: Congested and tired.  Still hypotensive.  Needed 250 mL bolus.  Changed Lasix to oral and lower dose.  Cortisol unremarkable.  6/9: Fatigue today after being moved and cleaned.  Likely transfer to SNF once accepted.  Converting in and out of A. fib.    Disposition   Likely transfer to SNF once accepted.     Review of Systems  Review of Systems   Constitutional: Positive for malaise/fatigue. Negative for chills and fever.   HENT: Positive for congestion.         Dry nose   Respiratory: Positive for shortness of breath and wheezing. Negative for cough.    Cardiovascular: Negative for chest pain and palpitations.   Gastrointestinal: Positive for diarrhea. Negative for constipation, nausea and vomiting.   Genitourinary: Negative for dysuria.   Neurological: Negative for headaches.   Psychiatric/Behavioral: The patient is nervous/anxious.         Physical Exam  Temp:  [36.5 °C (97.7 °F)-37.5 °C (99.5 °F)] 36.7 °C (98 °F)  Pulse:  [74-82] 80  Resp:  [16-20] 20  BP: ()/(52-79) 106/62  SpO2:  [91 %-96 %] 91 %    Physical Exam   Constitutional: She appears well-developed.   Morbidly obese   HENT:   Head: Normocephalic.   Eyes: Conjunctivae are normal.   Cardiovascular: Normal rate.  Exam reveals no gallop.    Pulmonary/Chest: No  respiratory distress. She has wheezes.   Congested   Abdominal: She exhibits no distension. There is no tenderness.   Musculoskeletal: She exhibits edema.   Neurological: She is alert.   Skin: Skin is warm. There is erythema.       Fluids  No intake or output data in the 24 hours ending 06/09/19 1403    Laboratory  Recent Labs      06/07/19   0535  06/08/19   0234  06/09/19   0216   WBC  6.4  6.2  5.1   RBC  4.26  4.36  4.26   HEMOGLOBIN  13.9  14.1  13.9   HEMATOCRIT  45.6  45.9  46.3   MCV  107.0*  105.3*  108.7*   MCH  32.6  32.3  32.6   MCHC  30.5*  30.7*  30.0*   RDW  52.4*  51.3*  52.1*   PLATELETCT  120*  116*  119*   MPV  10.3  10.3  9.9     Recent Labs      06/07/19   0535  06/08/19   0234  06/09/19   0216   SODIUM  139  136  138   POTASSIUM  3.9  4.0  4.3   CHLORIDE  102  102  105   CO2  33  31  26   GLUCOSE  93  98  78   BUN  16  13  9   CREATININE  0.94  0.94  0.77   CALCIUM  8.7  8.8  8.6                   Imaging  EC-ECHOCARDIOGRAM COMPLETE W/O CONT   Final Result      US-EXTREMITY VENOUS LOWER UNILAT LEFT   Final Result      DX-CHEST-LIMITED (1 VIEW)   Final Result         Persistent bibasilar consolidation, right greater than left with probable associated pleural fluid.      OUTSIDE IMAGES-DX CHEST   Final Result           Assessment/Plan  Vitamin B12 deficiency- (present on admission)   Assessment & Plan    Repleting IM and then orally     Hyperglycemia   Assessment & Plan    HbA1c 5.5  We will manage insulin levels     Macrocytosis   Assessment & Plan    B12 low at 237 folate unremarkable  Repleting B12 IM and then orally     Hypokalemia   Assessment & Plan    Replete with oral potassium chloride 40 mEq twice daily and monitor levels  Magnesium normal     Simple chronic bronchitis (HCC)   Assessment & Plan    No signs of acute exacerbation   bronchodilators per RT protocol     Hypertension   Assessment & Plan     hold her nadolol at this time given her low blood pressure  Monitor blood pressure and  adjust accordingly     Other specified hypothyroidism   Assessment & Plan    We will increase her levothyroxine to 137 MCG she will need recheck TFTs in 6 to 8 weeks     Pneumonia ruled out   Assessment & Plan    Possible pneumonia was initially suspected  Initially placed on IV ceftriaxone for her cellulitis and pneumonia and on azithromycin  Procalcitonin negative     Lower extremity edema   Assessment & Plan    Given her asymmetric edema we will check left lower extremity duplex  Echo unremarkable  We will start on IV Lasix and monitor intake and output     Acute on chronic respiratory failure with hypoxia (HCC)   Assessment & Plan    Oxygen protocol     Class 3 severe obesity due to excess calories with serious comorbidity and body mass index (BMI) of 45.0 to 49.9 in adult (HCC)   Assessment & Plan    Body mass index is 49.81 kg/m².      Cellulitis and abscess of left lower extremity   Assessment & Plan    Started on IV ceftriaxone  Now switched to oral          VTE prophylaxis: enoxaparin

## 2019-06-09 NOTE — PROGRESS NOTES
Report completed at pt bedside. This RN resumed care of patient.    Pt alert and oriented x4 upon arrival. Pt does not complain of pain. Pt does complain of SOB; respiratory to administer nebulizer treatment.     Chanell Gutierrez R.N.

## 2019-06-09 NOTE — PROGRESS NOTES
Patient resting comfortably in bed. No complaints at this time. Bed pan placed, watery stool multiple episodes this shift. MD notified. Rule out cdiff ordered.  Will continue to monitor.

## 2019-06-09 NOTE — CARE PLAN
Problem: Skin Integrity  Goal: Risk for impaired skin integrity will decrease    Intervention: Assess risk factors for impaired skin integrity and/or pressure ulcers  2 RN skin check performed with Helena ESPINO. Bilateral ears red but blanching, soft cannula applied. Redness under breast, pannus and perineum, barrier cream applied. Sacrum red but blanching. Incontinent. Cleaned and full linen change provided.  Redness LLE. Heels pink and intact. Heel float boots in place

## 2019-06-09 NOTE — PROGRESS NOTES
Cardiac Monitoring Report:  Rhythm: SR  High: 77  Low: 66  MN: 0.22  / QRS: 0.08  / QT: 0.40    Chanell Gutierrez R.N.

## 2019-06-09 NOTE — PROGRESS NOTES
2 RN skin check completed with KENZIE Cota.   Devices in place oxygen tubing, heel float boots.  Skin assessed under devices red, blanching, and intact.  Confirmed pressure ulcers found on NA.  New potential pressure ulcers noted on NA. Wound consult placed for LLE cellulitis.  The following interventions in place barrier cream, barrier paste, q2h turns, silicone oxygen tubing.  Back flaky and dry, skin intact.  Abdominal fold has some redness.  LLE red and edematous.     Chanell Gutierrez R.N.

## 2019-06-09 NOTE — CARE PLAN
Problem: Safety  Goal: Will remain free from injury  Outcome: PROGRESSING AS EXPECTED  Fall precautions in place.     Problem: Pain Management  Goal: Pain level will decrease to patient's comfort goal  Outcome: PROGRESSING AS EXPECTED  Pt has no complaints of pain.     Problem: Mobility  Goal: Risk for activity intolerance will decrease  Outcome: PROGRESSING SLOWER THAN EXPECTED  Pt refuses to mobilize d/t SOB and weakness. Pt encouraged to turn every 2 hours; pt often refuses to be turned.

## 2019-06-09 NOTE — PROGRESS NOTES
2 RN skin check performed with Helena ESPINO. Bilateral ears red but blanching, soft cannula applied. Redness under breast, pannus and perineum, barrier cream applied. Sacrum red but blanching. Incontinent. Cleaned and full linen change provided.  Redness LLE. Heels pink and intact. Heel float boots in place.

## 2019-06-10 VITALS
RESPIRATION RATE: 17 BRPM | OXYGEN SATURATION: 93 % | DIASTOLIC BLOOD PRESSURE: 58 MMHG | HEART RATE: 64 BPM | WEIGHT: 293 LBS | HEIGHT: 67 IN | BODY MASS INDEX: 45.99 KG/M2 | TEMPERATURE: 99.3 F | SYSTOLIC BLOOD PRESSURE: 110 MMHG

## 2019-06-10 LAB
ALBUMIN SERPL BCP-MCNC: 2.9 G/DL (ref 3.2–4.9)
BUN SERPL-MCNC: 9 MG/DL (ref 8–22)
CALCIUM SERPL-MCNC: 9.6 MG/DL (ref 8.5–10.5)
CHLORIDE SERPL-SCNC: 105 MMOL/L (ref 96–112)
CO2 SERPL-SCNC: 30 MMOL/L (ref 20–33)
CREAT SERPL-MCNC: 0.92 MG/DL (ref 0.5–1.4)
EKG IMPRESSION: NORMAL
ERYTHROCYTE [DISTWIDTH] IN BLOOD BY AUTOMATED COUNT: 51.1 FL (ref 35.9–50)
GLUCOSE SERPL-MCNC: 90 MG/DL (ref 65–99)
HCT VFR BLD AUTO: 47.1 % (ref 37–47)
HGB BLD-MCNC: 14.6 G/DL (ref 12–16)
MCH RBC QN AUTO: 33 PG (ref 27–33)
MCHC RBC AUTO-ENTMCNC: 31 G/DL (ref 33.6–35)
MCV RBC AUTO: 106.6 FL (ref 81.4–97.8)
PHOSPHATE SERPL-MCNC: 1.7 MG/DL (ref 2.5–4.5)
PLATELET # BLD AUTO: 108 K/UL (ref 164–446)
PMV BLD AUTO: 10.4 FL (ref 9–12.9)
POTASSIUM SERPL-SCNC: 4.5 MMOL/L (ref 3.6–5.5)
RBC # BLD AUTO: 4.42 M/UL (ref 4.2–5.4)
SODIUM SERPL-SCNC: 140 MMOL/L (ref 135–145)
WBC # BLD AUTO: 6.4 K/UL (ref 4.8–10.8)

## 2019-06-10 PROCEDURE — 700105 HCHG RX REV CODE 258: Performed by: HOSPITALIST

## 2019-06-10 PROCEDURE — 700111 HCHG RX REV CODE 636 W/ 250 OVERRIDE (IP): Performed by: HOSPITALIST

## 2019-06-10 PROCEDURE — 700101 HCHG RX REV CODE 250: Performed by: HOSPITALIST

## 2019-06-10 PROCEDURE — 700102 HCHG RX REV CODE 250 W/ 637 OVERRIDE(OP): Performed by: HOSPITALIST

## 2019-06-10 PROCEDURE — 94640 AIRWAY INHALATION TREATMENT: CPT

## 2019-06-10 PROCEDURE — 36415 COLL VENOUS BLD VENIPUNCTURE: CPT

## 2019-06-10 PROCEDURE — 99239 HOSP IP/OBS DSCHRG MGMT >30: CPT | Performed by: HOSPITALIST

## 2019-06-10 PROCEDURE — 302146: Performed by: HOSPITALIST

## 2019-06-10 PROCEDURE — A9270 NON-COVERED ITEM OR SERVICE: HCPCS | Performed by: HOSPITALIST

## 2019-06-10 PROCEDURE — 80069 RENAL FUNCTION PANEL: CPT

## 2019-06-10 PROCEDURE — 85027 COMPLETE CBC AUTOMATED: CPT

## 2019-06-10 RX ORDER — FUROSEMIDE 20 MG/1
20 TABLET ORAL DAILY
Qty: 60 TAB | Status: ON HOLD
Start: 2019-06-11 | End: 2019-10-29

## 2019-06-10 RX ORDER — LEVOTHYROXINE SODIUM 137 UG/1
137 TABLET ORAL
Qty: 30 TAB
Start: 2019-06-11

## 2019-06-10 RX ORDER — LOPERAMIDE HYDROCHLORIDE 2 MG/1
2 CAPSULE ORAL 4 TIMES DAILY PRN
Qty: 30 CAP | Status: ON HOLD
Start: 2019-06-10 | End: 2019-10-29

## 2019-06-10 RX ADMIN — FUROSEMIDE 20 MG: 20 TABLET ORAL at 06:12

## 2019-06-10 RX ADMIN — IPRATROPIUM BROMIDE AND ALBUTEROL SULFATE 3 ML: .5; 3 SOLUTION RESPIRATORY (INHALATION) at 14:25

## 2019-06-10 RX ADMIN — SODIUM PHOSPHATE, MONOBASIC, MONOHYDRATE AND SODIUM PHOSPHATE, DIBASIC, ANHYDROUS 20 MMOL: 276; 142 INJECTION, SOLUTION INTRAVENOUS at 09:31

## 2019-06-10 RX ADMIN — POTASSIUM CHLORIDE 40 MEQ: 1500 TABLET, EXTENDED RELEASE ORAL at 09:09

## 2019-06-10 RX ADMIN — LEVOTHYROXINE SODIUM 137 MCG: 25 TABLET ORAL at 06:12

## 2019-06-10 RX ADMIN — CEPHALEXIN 500 MG: 500 CAPSULE ORAL at 06:11

## 2019-06-10 RX ADMIN — ENOXAPARIN SODIUM 30 MG: 100 INJECTION SUBCUTANEOUS at 06:11

## 2019-06-10 RX ADMIN — IPRATROPIUM BROMIDE AND ALBUTEROL SULFATE 3 ML: .5; 3 SOLUTION RESPIRATORY (INHALATION) at 06:37

## 2019-06-10 RX ADMIN — LOPERAMIDE HYDROCHLORIDE 2 MG: 2 CAPSULE ORAL at 00:38

## 2019-06-10 RX ADMIN — CYANOCOBALAMIN TAB 500 MCG 1000 MCG: 500 TAB at 09:09

## 2019-06-10 RX ADMIN — LOPERAMIDE HYDROCHLORIDE 2 MG: 2 CAPSULE ORAL at 09:13

## 2019-06-10 RX ADMIN — IPRATROPIUM BROMIDE AND ALBUTEROL SULFATE 3 ML: .5; 3 SOLUTION RESPIRATORY (INHALATION) at 10:25

## 2019-06-10 NOTE — PROGRESS NOTES
2 RN skin check completed with KENZIE West.   Devices in place oxygen tubing, heel float boots.  Skin assessed under devices red, blanching, and intact.  Confirmed pressure ulcers found on NA.  New potential pressure ulcers noted on NA. Wound consult placed for LLE cellulitis.  The following interventions in place barrier cream, barrier paste, q2h turns, silicone oxygen tubing.  Back flaky and dry, skin intact.  Abdominal fold has some redness.  LLE red and edematous.  Buttocks has some redness d/t diarrhea; barrier cream applied.     Chanell Gutierrez R.N.

## 2019-06-10 NOTE — CARE PLAN
Problem: Safety  Goal: Will remain free from injury  Outcome: PROGRESSING AS EXPECTED  Fall precautions in place.     Problem: Mobility  Goal: Risk for activity intolerance will decrease  Outcome: PROGRESSING SLOWER THAN EXPECTED  Pt mobility has not improved. Takes an assist of 2 to roll in bed.

## 2019-06-10 NOTE — DISCHARGE PLANNING
Received Transport Form @ 1226  Spoke to Jean LINDA    Transport is scheduled for 6/10 @1430 going to [place].    Agency/Facility Name: [facility]  Spoke To: Melissa  Outcome: Informed of transport time.    Discharge summary sent.

## 2019-06-10 NOTE — PROGRESS NOTES
Isolation Precautions:    Patient is on Contact isolation to rule out CDiff.    Patient educated on reason for isolation, how the infection may be transmitted, and how to help prevent transmission to others.     Patient educated that Contact precautions involves staff and visitors wearing PPE, follow  Standard Precautions and perform meticulous hand hygiene in order to prevent transmission of infection. (Contact Precautions: gown and gloves; Special Contact Precautions: gown and gloves, with the added requirement of soap and water for hand hygiene; Droplet Precautions: surgical mask worn by staff and visitors in the room, and worn by the patient when out of the room; Airborne Precautions: involves staff wearing PPE to include an N95 Respirator or Controlled Air Purifying Respirator (CAPR).  Visitors should be limited and may wear an N95 mask).         Patient transport and mobilization on unit. Patient educated that they may leave their room, but prior to exiting, the patient needs to have on a fresh patient gown, ensure the potentially infectious area is covered, perform appropriate hand hygiene immediately prior to exiting the room. (*For Airborne Precautions: Patient educated that time out of the room should be minimized and limited to transport to diagnostic procedures or other activities. Patient is to wear surgical mask when required to leave the patient room).

## 2019-06-10 NOTE — PROGRESS NOTES
Report completed at pt bedside. This RN resumed care of patient.    Pt is alert and oriented x4 upon arrival. Pt does complain of some diarrhea, which stool sample has been collected and sent; awaiting stool results. Pt in contact isolation to rule out CDiff. Pt has no complaints of pain or SOB at this time.    Chanell Gutierrez R.N.

## 2019-06-10 NOTE — PROGRESS NOTES
Pt went into uncontrolled A flutter at 2010; EKG confirmed to be uncontrolled A flutter. Other than increased HR, VSS.  Vitals:    06/09/19 2010   BP: 114/60   Pulse: (!) 115   Resp: 16   Temp: 37.1 °C (98.8 °F)   SpO2: 96%       Dr. Gonzalez made aware of uncontrolled A Flutter. IVP cardizem ordered and 500 cc bolus ordered.    Chanell Gutierrez R.N.

## 2019-06-10 NOTE — PROGRESS NOTES
Per analisa Medrano MD when HR is >120 and sustained for 15 min or longer.    Chanell Gutierrez R.N.

## 2019-06-10 NOTE — DISCHARGE PLANNING
Anticipated Discharge Disposition: SNF     Action: WIL spoke with Adriana at [facility]. She states that they are able to accept pt and they can bill pt's [facility] secondary. They do have a bed open today.   CM met with pt at bedside. Pt states that she is in agreement to go to [facility] today.   CM completed [facility] form and faxed to Juan GARCIA.     Barriers to Discharge:     Plan: Await transport time to [facility].

## 2019-06-10 NOTE — DISCHARGE SUMMARY
"Discharge Summary    CHIEF COMPLAINT ON ADMISSION  Chief Complaint   Patient presents with   • Shortness of Breath     Pt called EMS after increasing SOB the last few days. Pt typically wears 3L NC at home, pt required 5L PTA. Respirations even and unlabored at this time. Banner georges reports possible pneumonia or CHF exacerabtion.    • Peripheral Edema     Pt reports \"the last few days it's been swelling up real bad.\" Bilateral LE 4+ pitting edema and redness noted. [facility]anner diagnosed pt with LE cellulitis.        Reason for Admission  EMS     CODE STATUS  Full Code    HPI & HOSPITAL COURSE  68 y.o. female who presented 6/5/2019 with history of COPD sleep apnea and hypothyroidism.  She was seen at her local emergency room where there was concern for possible pneumonia and cellulitis. She had one low systolic blood pressure reading in the 90s so she was transferred to our facility for higher level of care.    She was admitted for management.  She was initially placed on IV ceftriaxone.  However, her procalcitonin was negative and she appeared to have more venous stasis so she was downgraded to oral Keflex.  She then developed diarrhea but C. difficile was negative.  Thus she was started on loperamide.  She also had vitamin B12 deficiency so she was started on repletion.  Her TSH was high so her levothyroxine dose was increased to 137.  She continues to have borderline blood pressures but this is chronic for her.  She should continue on her Lasix to prevent worsening of swelling.  She was weak so she will be transferred to SNF for further therapy.        Therefore, she is discharged in good and stable condition to skilled nursing facility.    The patient met 2-midnight criteria for an inpatient stay at the time of discharge.      FOLLOW UP ITEMS POST DISCHARGE  May need to check potassium levels on Lasix.    DISCHARGE DIAGNOSES  Active Problems:    Cellulitis and abscess of left lower extremity POA: Unknown    Class 3 " severe obesity due to excess calories with serious comorbidity and body mass index (BMI) of 45.0 to 49.9 in adult ([facility]) POA: Unknown    Acute on chronic respiratory failure with hypoxia ([facility]) POA: Unknown    Lower extremity edema POA: Unknown    Pneumonia ruled out POA: Unknown    Other specified hypothyroidism POA: Unknown    Hypertension POA: Unknown    Simple chronic bronchitis (HCC) POA: Unknown    Hypokalemia POA: Unknown    Macrocytosis POA: Unknown    Hyperglycemia POA: Unknown    Vitamin B12 deficiency POA: Yes  Resolved Problems:    * No resolved hospital problems. *      FOLLOW UP  No future appointments.  No follow-up provider specified.    MEDICATIONS ON DISCHARGE     Medication List      START taking these medications      Instructions   enoxaparin 30 MG/0.3ML Soln inj  Commonly known as:  LOVENOX   Inject 0.3 mL as instructed every 12 hours.  Dose:  30 mg     furosemide 20 MG Tabs  Start taking on:  6/11/2019  Commonly known as:  LASIX   Take 1 Tab by mouth every day.  Dose:  20 mg     loperamide 2 MG Caps  Commonly known as:  IMODIUM   Take 1 Cap by mouth 4 times a day as needed for Diarrhea.  Dose:  2 mg        CHANGE how you take these medications      Instructions   levothyroxine 137 MCG Tabs  Start taking on:  6/11/2019  What changed:  · medication strength  · how much to take  Commonly known as:  SYNTHROID   Take 1 Tab by mouth Every morning on an empty stomach.  Dose:  137 mcg        CONTINUE taking these medications      Instructions   albuterol 108 (90 Base) MCG/ACT Aers inhalation aerosol   Inhale 2 Puffs by mouth every 6 hours as needed for Shortness of Breath.  Dose:  2 Puff     B-12 PO   Take 1 Dose by mouth every day. Unknown OTC Strength  Dose:  1 Dose     HYDROcodone-acetaminophen 5-325 MG Tabs per tablet  Commonly known as:  NORCO   Take 1 Tab by mouth every day. Scheduled.  Dose:  1 Tab     nadolol 40 MG Tabs  Commonly known as:  CORGARD   Take 40 mg by mouth every day.  Dose:  40  mg            Allergies  Allergies   Allergen Reactions   • Doxycycline      headache   • Penicillins      D/W Patient 06/2019 - unknown rxn as a kid; states she tolerated Keflex  -Tolerated Ceftriaxone inpatient   • Sulfa Drugs        DIET  Orders Placed This Encounter   Procedures   • Diet Order Cardiac, 2 Gram Sodium     Standing Status:   Standing     Number of Occurrences:   1     Order Specific Question:   Diet:     Answer:   Cardiac [6]     Order Specific Question:   Diet:     Answer:   2 Gram Sodium [7]       ACTIVITY  As tolerated and directed by skilled nursing.  Weight bearing as tolerated    LINES, DRAINS, AND WOUNDS  This is an automated list. Peripheral IVs will be removed prior to discharge.  Peripheral IV 06/05/19 20 G Left Hand (Active)   Site Assessment Clean;Dry;Intact 6/9/2019  9:00 PM   Dressing Type Transparent 6/9/2019  9:00 PM   Line Status Flushed;Blood return noted;Capped 6/9/2019  9:00 PM   Dressing Status Clean;Dry;Intact 6/9/2019  9:00 PM   Dressing Intervention N/A 6/9/2019  9:00 PM   Infiltration Grading ([facility]) 0 6/9/2019 11:00 AM   Phlebitis Scale ([facility] Only) 0 6/9/2019 11:00 AM       Moisture Associated Skin Damage Medial Buttock;Sacrum (Active)   Drainage Amount None 6/9/2019  9:00 PM   Maira-wound Assessment Excoriated;Pink 6/9/2019  9:00 PM   Cleansing Maira Spray 6/9/2019  9:00 PM   Periwound Protectant Moisture Barrier;Skin Protectant wipes to Periwound 6/9/2019  9:00 PM       Moisture Associated Skin Damage Right Lateral Panus (Active)   Drainage Amount None 6/9/2019  9:00 PM   Drainage Description Purulent 6/7/2019 10:00 AM   Maira-wound Assessment Red 6/9/2019  9:00 PM       Moisture Associated Skin Damage Left Lateral Breast (Active)   Drainage Amount None 6/9/2019  9:00 PM   Drainage Description Other (Comment) 6/5/2019  3:00 PM   Maira-wound Assessment Red 6/9/2019  9:00 PM       Peripheral IV 06/05/19 20 G Left Hand (Active)   Site Assessment Clean;Dry;Intact  6/9/2019  9:00 PM   Dressing Type Transparent 6/9/2019  9:00 PM   Line Status Flushed;Blood return noted;Capped 6/9/2019  9:00 PM   Dressing Status Clean;Dry;Intact 6/9/2019  9:00 PM   Dressing Intervention N/A 6/9/2019  9:00 PM   Infiltration Grading ([facility], [facility]) 0 6/9/2019 11:00 AM   Phlebitis Scale ([facility] Only) 0 6/9/2019 11:00 AM               MENTAL STATUS ON TRANSFER  Level of Consciousness: Alert  Orientation : Oriented x 4  Speech: Speech Clear    CONSULTATIONS  None    PROCEDURES  None    LABORATORY  Lab Results   Component Value Date    SODIUM 140 06/10/2019    POTASSIUM 4.5 06/10/2019    CHLORIDE 105 06/10/2019    CO2 30 06/10/2019    GLUCOSE 90 06/10/2019    BUN 9 06/10/2019    CREATININE 0.92 06/10/2019        Lab Results   Component Value Date    WBC 6.4 06/10/2019    HEMOGLOBIN 14.6 06/10/2019    HEMATOCRIT 47.1 (H) 06/10/2019    PLATELETCT 108 (L) 06/10/2019        Total time of the discharge process exceeds 44 minutes.

## 2019-06-25 NOTE — DOCUMENTATION QUERY
[facility]                                                                       Query Response Note      PATIENT:               XANDER HOLLY  ACCT #:                  [id number]  MRN:                     [id number]  :                      1950  ADMIT DATE:       2019 12:55 AM  DISCH DATE:        6/10/2019 3:04 PM  RESPONDING  PROVIDER #:        [id number]           QUERY TEXT:    CHF is documented by the ED provider, Please clarify status of this condition:    NOTE:  If an appropriate response is not listed below, please respond with a new note.       The patient's Clinical Indicators include:  The peripheral edema is likely secondary to CHF, and the patient reports a cardiac echo years ago, but no recent evaluation, and does not think she carries an official diagnosis of congestive heart failure, nor is she medicated for congestive heart failure.    Query created by: Aleja Mendoza on 2019 12:53 PM    RESPONSE TEXT:    Congestive heart failure is ruled out          Electronically signed by:  TIFFANY JIMÉNEZ MD 2019 11:12 AM

## 2019-10-28 ENCOUNTER — APPOINTMENT (OUTPATIENT)
Dept: RADIOLOGY | Facility: MEDICAL CENTER | Age: 69
DRG: 208 | End: 2019-10-28
Attending: EMERGENCY MEDICINE
Payer: MEDICARE

## 2019-10-28 ENCOUNTER — HOSPITAL ENCOUNTER (INPATIENT)
Facility: MEDICAL CENTER | Age: 69
LOS: 24 days | DRG: 208 | End: 2019-11-21
Attending: EMERGENCY MEDICINE | Admitting: HOSPITALIST
Payer: MEDICARE

## 2019-10-28 DIAGNOSIS — J96.02 ACUTE RESPIRATORY FAILURE WITH HYPOXIA AND HYPERCARBIA (HCC): ICD-10-CM

## 2019-10-28 DIAGNOSIS — J96.01 ACUTE RESPIRATORY FAILURE WITH HYPOXIA AND HYPERCARBIA (HCC): ICD-10-CM

## 2019-10-28 DIAGNOSIS — R09.2 RESPIRATORY ARREST (HCC): ICD-10-CM

## 2019-10-28 DIAGNOSIS — J96.22 ACUTE ON CHRONIC RESPIRATORY FAILURE WITH HYPOXIA AND HYPERCAPNIA (HCC): ICD-10-CM

## 2019-10-28 DIAGNOSIS — E66.01 MORBID OBESITY (HCC): ICD-10-CM

## 2019-10-28 DIAGNOSIS — I47.20 VENTRICULAR TACHYCARDIA (HCC): ICD-10-CM

## 2019-10-28 DIAGNOSIS — J96.21 ACUTE ON CHRONIC RESPIRATORY FAILURE WITH HYPOXIA AND HYPERCAPNIA (HCC): ICD-10-CM

## 2019-10-28 DIAGNOSIS — J44.1 CHRONIC OBSTRUCTIVE PULMONARY DISEASE WITH ACUTE EXACERBATION (HCC): ICD-10-CM

## 2019-10-28 DIAGNOSIS — I46.9 CARDIAC ARREST (HCC): ICD-10-CM

## 2019-10-28 LAB
ACTION RANGE TRIGGERED IACRT: YES
ACTION RANGE TRIGGERED IACRT: YES
ALBUMIN SERPL BCP-MCNC: 2.9 G/DL (ref 3.2–4.9)
ALBUMIN/GLOB SERPL: 1.2 G/DL
ALP SERPL-CCNC: 163 U/L (ref 30–99)
ALT SERPL-CCNC: 37 U/L (ref 2–50)
ANION GAP SERPL CALC-SCNC: 3 MMOL/L (ref 0–11.9)
ANION GAP SERPL CALC-SCNC: 3 MMOL/L (ref 0–11.9)
ANISOCYTOSIS BLD QL SMEAR: ABNORMAL
AST SERPL-CCNC: 53 U/L (ref 12–45)
BASE EXCESS BLDA CALC-SCNC: 3 MMOL/L (ref -4–3)
BASE EXCESS BLDA CALC-SCNC: 5 MMOL/L (ref -4–3)
BASOPHILS # BLD AUTO: 0 % (ref 0–1.8)
BASOPHILS # BLD: 0 K/UL (ref 0–0.12)
BILIRUB SERPL-MCNC: 0.8 MG/DL (ref 0.1–1.5)
BODY TEMPERATURE: ABNORMAL DEGREES
BODY TEMPERATURE: ABNORMAL DEGREES
BUN SERPL-MCNC: 19 MG/DL (ref 8–22)
BUN SERPL-MCNC: 19 MG/DL (ref 8–22)
CALCIUM SERPL-MCNC: 7.9 MG/DL (ref 8.5–10.5)
CALCIUM SERPL-MCNC: 8.7 MG/DL (ref 8.5–10.5)
CHLORIDE SERPL-SCNC: 102 MMOL/L (ref 96–112)
CHLORIDE SERPL-SCNC: 105 MMOL/L (ref 96–112)
CO2 BLDA-SCNC: 36 MMOL/L (ref 20–33)
CO2 BLDA-SCNC: 36 MMOL/L (ref 20–33)
CO2 SERPL-SCNC: 34 MMOL/L (ref 20–33)
CO2 SERPL-SCNC: 34 MMOL/L (ref 20–33)
CREAT SERPL-MCNC: 0.74 MG/DL (ref 0.5–1.4)
CREAT SERPL-MCNC: 0.81 MG/DL (ref 0.5–1.4)
EKG IMPRESSION: NORMAL
EOSINOPHIL # BLD AUTO: 0 K/UL (ref 0–0.51)
EOSINOPHIL NFR BLD: 0 % (ref 0–6.9)
ERYTHROCYTE [DISTWIDTH] IN BLOOD BY AUTOMATED COUNT: 52.5 FL (ref 35.9–50)
GLOBULIN SER CALC-MCNC: 2.5 G/DL (ref 1.9–3.5)
GLUCOSE SERPL-MCNC: 150 MG/DL (ref 65–99)
GLUCOSE SERPL-MCNC: 172 MG/DL (ref 65–99)
HCO3 BLDA-SCNC: 33.7 MMOL/L (ref 17–25)
HCO3 BLDA-SCNC: 33.8 MMOL/L (ref 17–25)
HCT VFR BLD AUTO: 49.3 % (ref 37–47)
HGB BLD-MCNC: 14.3 G/DL (ref 12–16)
HOROWITZ INDEX BLDA+IHG-RTO: 56 MM[HG]
HOROWITZ INDEX BLDA+IHG-RTO: 66 MM[HG]
INST. QUALIFIED PATIENT IIQPT: YES
INST. QUALIFIED PATIENT IIQPT: YES
LACTATE BLD-SCNC: 1.1 MMOL/L (ref 0.5–2)
LYMPHOCYTES # BLD AUTO: 0.22 K/UL (ref 1–4.8)
LYMPHOCYTES NFR BLD: 1.8 % (ref 22–41)
MACROCYTES BLD QL SMEAR: ABNORMAL
MAGNESIUM SERPL-MCNC: 1.7 MG/DL (ref 1.5–2.5)
MANUAL DIFF BLD: NORMAL
MCH RBC QN AUTO: 31.3 PG (ref 27–33)
MCHC RBC AUTO-ENTMCNC: 29 G/DL (ref 33.6–35)
MCV RBC AUTO: 107.9 FL (ref 81.4–97.8)
MONOCYTES # BLD AUTO: 0.21 K/UL (ref 0–0.85)
MONOCYTES NFR BLD AUTO: 1.7 % (ref 0–13.4)
MORPHOLOGY BLD-IMP: NORMAL
NEUTROPHILS # BLD AUTO: 11.87 K/UL (ref 2–7.15)
NEUTROPHILS NFR BLD: 90.4 % (ref 44–72)
NEUTS BAND NFR BLD MANUAL: 6.1 % (ref 0–10)
NRBC # BLD AUTO: 0 K/UL
NRBC BLD-RTO: 0 /100 WBC
NT-PROBNP SERPL IA-MCNC: 1832 PG/ML (ref 0–125)
O2/TOTAL GAS SETTING VFR VENT: 100 %
O2/TOTAL GAS SETTING VFR VENT: 80 %
PCO2 BLDA: 67.9 MMHG (ref 26–37)
PCO2 BLDA: 76.1 MMHG (ref 26–37)
PCO2 TEMP ADJ BLDA: 67.9 MMHG (ref 26–37)
PCO2 TEMP ADJ BLDA: 76.1 MMHG (ref 26–37)
PH BLDA: 7.25 [PH] (ref 7.4–7.5)
PH BLDA: 7.3 [PH] (ref 7.4–7.5)
PH TEMP ADJ BLDA: 7.25 [PH] (ref 7.4–7.5)
PH TEMP ADJ BLDA: 7.3 [PH] (ref 7.4–7.5)
PHOSPHATE SERPL-MCNC: 2.2 MG/DL (ref 2.5–4.5)
PLATELET # BLD AUTO: 120 K/UL (ref 164–446)
PLATELET BLD QL SMEAR: NORMAL
PMV BLD AUTO: 10.7 FL (ref 9–12.9)
PO2 BLDA: 45 MMHG (ref 64–87)
PO2 BLDA: 66 MMHG (ref 64–87)
PO2 TEMP ADJ BLDA: 45 MMHG (ref 64–87)
PO2 TEMP ADJ BLDA: 66 MMHG (ref 64–87)
POTASSIUM SERPL-SCNC: 3.5 MMOL/L (ref 3.6–5.5)
POTASSIUM SERPL-SCNC: 4.1 MMOL/L (ref 3.6–5.5)
PROT SERPL-MCNC: 5.4 G/DL (ref 6–8.2)
RBC # BLD AUTO: 4.57 M/UL (ref 4.2–5.4)
RBC BLD AUTO: PRESENT
SAO2 % BLDA: 75 % (ref 93–99)
SAO2 % BLDA: 88 % (ref 93–99)
SODIUM SERPL-SCNC: 139 MMOL/L (ref 135–145)
SODIUM SERPL-SCNC: 142 MMOL/L (ref 135–145)
SPECIMEN DRAWN FROM PATIENT: ABNORMAL
SPECIMEN DRAWN FROM PATIENT: ABNORMAL
TRIGL SERPL-MCNC: 103 MG/DL (ref 0–149)
TROPONIN T SERPL-MCNC: 25 NG/L (ref 6–19)
TROPONIN T SERPL-MCNC: 40 NG/L (ref 6–19)
WBC # BLD AUTO: 12.3 K/UL (ref 4.8–10.8)

## 2019-10-28 PROCEDURE — 84100 ASSAY OF PHOSPHORUS: CPT

## 2019-10-28 PROCEDURE — 700101 HCHG RX REV CODE 250: Performed by: INTERNAL MEDICINE

## 2019-10-28 PROCEDURE — 36556 INSERT NON-TUNNEL CV CATH: CPT

## 2019-10-28 PROCEDURE — 5A1945Z RESPIRATORY VENTILATION, 24-96 CONSECUTIVE HOURS: ICD-10-PCS | Performed by: INTERNAL MEDICINE

## 2019-10-28 PROCEDURE — 96375 TX/PRO/DX INJ NEW DRUG ADDON: CPT

## 2019-10-28 PROCEDURE — 51702 INSERT TEMP BLADDER CATH: CPT

## 2019-10-28 PROCEDURE — 700111 HCHG RX REV CODE 636 W/ 250 OVERRIDE (IP): Performed by: EMERGENCY MEDICINE

## 2019-10-28 PROCEDURE — 700101 HCHG RX REV CODE 250: Performed by: EMERGENCY MEDICINE

## 2019-10-28 PROCEDURE — 307076 HCHG PAD,HYPOTHERMIA GEL SM/MED/LG

## 2019-10-28 PROCEDURE — 83605 ASSAY OF LACTIC ACID: CPT

## 2019-10-28 PROCEDURE — 02HV33Z INSERTION OF INFUSION DEVICE INTO SUPERIOR VENA CAVA, PERCUTANEOUS APPROACH: ICD-10-PCS | Performed by: EMERGENCY MEDICINE

## 2019-10-28 PROCEDURE — C1751 CATH, INF, PER/CENT/MIDLINE: HCPCS | Performed by: EMERGENCY MEDICINE

## 2019-10-28 PROCEDURE — 84478 ASSAY OF TRIGLYCERIDES: CPT

## 2019-10-28 PROCEDURE — 71045 X-RAY EXAM CHEST 1 VIEW: CPT

## 2019-10-28 PROCEDURE — 700111 HCHG RX REV CODE 636 W/ 250 OVERRIDE (IP): Performed by: INTERNAL MEDICINE

## 2019-10-28 PROCEDURE — 84484 ASSAY OF TROPONIN QUANT: CPT

## 2019-10-28 PROCEDURE — 82803 BLOOD GASES ANY COMBINATION: CPT | Mod: 91

## 2019-10-28 PROCEDURE — 99292 CRITICAL CARE ADDL 30 MIN: CPT | Performed by: INTERNAL MEDICINE

## 2019-10-28 PROCEDURE — 96368 THER/DIAG CONCURRENT INF: CPT

## 2019-10-28 PROCEDURE — C1751 CATH, INF, PER/CENT/MIDLINE: HCPCS

## 2019-10-28 PROCEDURE — 99223 1ST HOSP IP/OBS HIGH 75: CPT | Performed by: HOSPITALIST

## 2019-10-28 PROCEDURE — 303105 HCHG CATHETER EXTRA

## 2019-10-28 PROCEDURE — 80053 COMPREHEN METABOLIC PANEL: CPT

## 2019-10-28 PROCEDURE — 85027 COMPLETE CBC AUTOMATED: CPT

## 2019-10-28 PROCEDURE — 770022 HCHG ROOM/CARE - ICU (200)

## 2019-10-28 PROCEDURE — 71275 CT ANGIOGRAPHY CHEST: CPT

## 2019-10-28 PROCEDURE — 99291 CRITICAL CARE FIRST HOUR: CPT | Performed by: INTERNAL MEDICINE

## 2019-10-28 PROCEDURE — 36415 COLL VENOUS BLD VENIPUNCTURE: CPT

## 2019-10-28 PROCEDURE — 85007 BL SMEAR W/DIFF WBC COUNT: CPT

## 2019-10-28 PROCEDURE — 36600 WITHDRAWAL OF ARTERIAL BLOOD: CPT

## 2019-10-28 PROCEDURE — 99358 PROLONG SERVICE W/O CONTACT: CPT | Performed by: HOSPITALIST

## 2019-10-28 PROCEDURE — 93005 ELECTROCARDIOGRAM TRACING: CPT

## 2019-10-28 PROCEDURE — 700105 HCHG RX REV CODE 258: Performed by: INTERNAL MEDICINE

## 2019-10-28 PROCEDURE — 96366 THER/PROPH/DIAG IV INF ADDON: CPT

## 2019-10-28 PROCEDURE — 80048 BASIC METABOLIC PNL TOTAL CA: CPT

## 2019-10-28 PROCEDURE — 99223 1ST HOSP IP/OBS HIGH 75: CPT | Performed by: INTERNAL MEDICINE

## 2019-10-28 PROCEDURE — 700105 HCHG RX REV CODE 258: Performed by: EMERGENCY MEDICINE

## 2019-10-28 PROCEDURE — 96365 THER/PROPH/DIAG IV INF INIT: CPT

## 2019-10-28 PROCEDURE — 304538 HCHG NG TUBE

## 2019-10-28 PROCEDURE — 700117 HCHG RX CONTRAST REV CODE 255: Performed by: EMERGENCY MEDICINE

## 2019-10-28 PROCEDURE — 94002 VENT MGMT INPAT INIT DAY: CPT

## 2019-10-28 PROCEDURE — 83880 ASSAY OF NATRIURETIC PEPTIDE: CPT

## 2019-10-28 PROCEDURE — 700111 HCHG RX REV CODE 636 W/ 250 OVERRIDE (IP)

## 2019-10-28 PROCEDURE — 83735 ASSAY OF MAGNESIUM: CPT

## 2019-10-28 PROCEDURE — 93005 ELECTROCARDIOGRAM TRACING: CPT | Performed by: EMERGENCY MEDICINE

## 2019-10-28 PROCEDURE — 99291 CRITICAL CARE FIRST HOUR: CPT

## 2019-10-28 RX ORDER — MAGNESIUM SULFATE HEPTAHYDRATE 40 MG/ML
4 INJECTION, SOLUTION INTRAVENOUS ONCE
Status: COMPLETED | OUTPATIENT
Start: 2019-10-29 | End: 2019-10-29

## 2019-10-28 RX ORDER — METHYLPREDNISOLONE SODIUM SUCCINATE 125 MG/2ML
125 INJECTION, POWDER, LYOPHILIZED, FOR SOLUTION INTRAMUSCULAR; INTRAVENOUS ONCE
Status: COMPLETED | OUTPATIENT
Start: 2019-10-28 | End: 2019-10-28

## 2019-10-28 RX ORDER — 0.9 % SODIUM CHLORIDE 0.9 %
20 INTRAVENOUS SOLUTION INTRAVENOUS ONCE
Status: DISCONTINUED | OUTPATIENT
Start: 2019-10-28 | End: 2019-10-28

## 2019-10-28 RX ORDER — SODIUM CHLORIDE, SODIUM LACTATE, POTASSIUM CHLORIDE, CALCIUM CHLORIDE 600; 310; 30; 20 MG/100ML; MG/100ML; MG/100ML; MG/100ML
1000 INJECTION, SOLUTION INTRAVENOUS ONCE
Status: COMPLETED | OUTPATIENT
Start: 2019-10-28 | End: 2019-10-28

## 2019-10-28 RX ORDER — MIDAZOLAM HYDROCHLORIDE 1 MG/ML
2 INJECTION INTRAMUSCULAR; INTRAVENOUS ONCE
Status: COMPLETED | OUTPATIENT
Start: 2019-10-28 | End: 2019-10-28

## 2019-10-28 RX ORDER — NOREPINEPHRINE BITARTRATE 1 MG/ML
INJECTION, SOLUTION INTRAVENOUS
Status: DISPENSED
Start: 2019-10-28 | End: 2019-10-29

## 2019-10-28 RX ORDER — AMOXICILLIN 250 MG
2 CAPSULE ORAL 2 TIMES DAILY
Status: DISCONTINUED | OUTPATIENT
Start: 2019-10-28 | End: 2019-10-28

## 2019-10-28 RX ORDER — ONDANSETRON 4 MG/1
4 TABLET, ORALLY DISINTEGRATING ORAL EVERY 4 HOURS PRN
Status: DISCONTINUED | OUTPATIENT
Start: 2019-10-28 | End: 2019-10-28

## 2019-10-28 RX ORDER — ONDANSETRON 2 MG/ML
4 INJECTION INTRAMUSCULAR; INTRAVENOUS EVERY 4 HOURS PRN
Status: DISCONTINUED | OUTPATIENT
Start: 2019-10-28 | End: 2019-11-21 | Stop reason: HOSPADM

## 2019-10-28 RX ORDER — DEXMEDETOMIDINE HYDROCHLORIDE 4 UG/ML
0-1.5 INJECTION, SOLUTION INTRAVENOUS CONTINUOUS
Status: DISCONTINUED | OUTPATIENT
Start: 2019-10-29 | End: 2019-10-29

## 2019-10-28 RX ORDER — MAGNESIUM SULFATE HEPTAHYDRATE 40 MG/ML
.5-2 INJECTION, SOLUTION INTRAVENOUS CONTINUOUS
Status: DISCONTINUED | OUTPATIENT
Start: 2019-10-28 | End: 2019-10-28

## 2019-10-28 RX ORDER — AMOXICILLIN 250 MG
2 CAPSULE ORAL 2 TIMES DAILY
Status: DISCONTINUED | OUTPATIENT
Start: 2019-10-28 | End: 2019-10-31

## 2019-10-28 RX ORDER — SODIUM CHLORIDE 9 MG/ML
INJECTION, SOLUTION INTRAVENOUS
Status: ACTIVE
Start: 2019-10-28 | End: 2019-10-29

## 2019-10-28 RX ORDER — ONDANSETRON 4 MG/1
4 TABLET, ORALLY DISINTEGRATING ORAL EVERY 4 HOURS PRN
Status: DISCONTINUED | OUTPATIENT
Start: 2019-10-28 | End: 2019-10-31

## 2019-10-28 RX ORDER — BISACODYL 10 MG
10 SUPPOSITORY, RECTAL RECTAL
Status: DISCONTINUED | OUTPATIENT
Start: 2019-10-28 | End: 2019-10-31

## 2019-10-28 RX ORDER — MEPERIDINE HYDROCHLORIDE 50 MG/ML
25 INJECTION INTRAMUSCULAR; INTRAVENOUS; SUBCUTANEOUS
Status: DISCONTINUED | OUTPATIENT
Start: 2019-10-28 | End: 2019-10-28

## 2019-10-28 RX ORDER — POLYETHYLENE GLYCOL 3350 17 G/17G
1 POWDER, FOR SOLUTION ORAL
Status: DISCONTINUED | OUTPATIENT
Start: 2019-10-28 | End: 2019-10-28

## 2019-10-28 RX ORDER — FAMOTIDINE 20 MG/1
20 TABLET, FILM COATED ORAL EVERY 12 HOURS
Status: DISCONTINUED | OUTPATIENT
Start: 2019-10-29 | End: 2019-10-29

## 2019-10-28 RX ORDER — LEVOTHYROXINE SODIUM 137 UG/1
137 TABLET ORAL
Status: DISCONTINUED | OUTPATIENT
Start: 2019-10-29 | End: 2019-10-29

## 2019-10-28 RX ORDER — 0.9 % SODIUM CHLORIDE 0.9 %
10 INTRAVENOUS SOLUTION INTRAVENOUS
Status: DISCONTINUED | OUTPATIENT
Start: 2019-10-28 | End: 2019-10-28

## 2019-10-28 RX ORDER — POTASSIUM CHLORIDE 14.9 MG/ML
20 INJECTION INTRAVENOUS ONCE
Status: COMPLETED | OUTPATIENT
Start: 2019-10-29 | End: 2019-10-29

## 2019-10-28 RX ORDER — POLYETHYLENE GLYCOL 3350 17 G/17G
1 POWDER, FOR SOLUTION ORAL
Status: DISCONTINUED | OUTPATIENT
Start: 2019-10-28 | End: 2019-10-31

## 2019-10-28 RX ORDER — SODIUM CHLORIDE 9 MG/ML
INJECTION, SOLUTION INTRAVENOUS CONTINUOUS
Status: DISCONTINUED | OUTPATIENT
Start: 2019-10-28 | End: 2019-10-28

## 2019-10-28 RX ORDER — IPRATROPIUM BROMIDE AND ALBUTEROL SULFATE 2.5; .5 MG/3ML; MG/3ML
3 SOLUTION RESPIRATORY (INHALATION)
Status: DISCONTINUED | OUTPATIENT
Start: 2019-10-28 | End: 2019-11-15

## 2019-10-28 RX ORDER — BISACODYL 10 MG
10 SUPPOSITORY, RECTAL RECTAL
Status: DISCONTINUED | OUTPATIENT
Start: 2019-10-28 | End: 2019-10-28

## 2019-10-28 RX ORDER — VECURONIUM BROMIDE 1 MG/ML
0.1 INJECTION, POWDER, LYOPHILIZED, FOR SOLUTION INTRAVENOUS
Status: DISCONTINUED | OUTPATIENT
Start: 2019-10-28 | End: 2019-10-28

## 2019-10-28 RX ADMIN — POTASSIUM CHLORIDE 20 MEQ: 14.9 INJECTION, SOLUTION INTRAVENOUS at 23:59

## 2019-10-28 RX ADMIN — NOREPINEPHRINE BITARTRATE 5 MCG/MIN: 1 INJECTION INTRAVENOUS at 19:48

## 2019-10-28 RX ADMIN — AMIODARONE HYDROCHLORIDE 1 MG/MIN: 50 INJECTION, SOLUTION INTRAVENOUS at 22:16

## 2019-10-28 RX ADMIN — IOHEXOL 41 ML: 350 INJECTION, SOLUTION INTRAVENOUS at 21:02

## 2019-10-28 RX ADMIN — FENTANYL CITRATE 100 MCG: 50 INJECTION, SOLUTION INTRAMUSCULAR; INTRAVENOUS at 22:41

## 2019-10-28 RX ADMIN — NOREPINEPHRINE BITARTRATE 10 MCG/MIN: 1 INJECTION INTRAVENOUS at 22:57

## 2019-10-28 RX ADMIN — SODIUM CHLORIDE, POTASSIUM CHLORIDE, SODIUM LACTATE AND CALCIUM CHLORIDE 1000 ML: 600; 310; 30; 20 INJECTION, SOLUTION INTRAVENOUS at 20:22

## 2019-10-28 RX ADMIN — PROPOFOL 15 MCG/KG/MIN: 10 INJECTION, EMULSION INTRAVENOUS at 23:58

## 2019-10-28 RX ADMIN — MAGNESIUM SULFATE IN WATER 4 G: 40 INJECTION, SOLUTION INTRAVENOUS at 23:58

## 2019-10-28 RX ADMIN — METHYLPREDNISOLONE SODIUM SUCCINATE 125 MG: 125 INJECTION, POWDER, FOR SOLUTION INTRAMUSCULAR; INTRAVENOUS at 21:17

## 2019-10-28 RX ADMIN — AMIODARONE HYDROCHLORIDE 150 MG: 1.5 INJECTION, SOLUTION INTRAVENOUS at 19:53

## 2019-10-28 RX ADMIN — MIDAZOLAM HYDROCHLORIDE 2 MG: 1 INJECTION, SOLUTION INTRAMUSCULAR; INTRAVENOUS at 22:41

## 2019-10-29 ENCOUNTER — APPOINTMENT (OUTPATIENT)
Dept: RADIOLOGY | Facility: MEDICAL CENTER | Age: 69
DRG: 208 | End: 2019-10-29
Attending: INTERNAL MEDICINE
Payer: MEDICARE

## 2019-10-29 ENCOUNTER — APPOINTMENT (OUTPATIENT)
Dept: CARDIOLOGY | Facility: MEDICAL CENTER | Age: 69
DRG: 208 | End: 2019-10-29
Attending: HOSPITALIST
Payer: MEDICARE

## 2019-10-29 PROBLEM — R79.89 ELEVATED TROPONIN: Status: ACTIVE | Noted: 2019-10-29

## 2019-10-29 PROBLEM — E66.01 MORBID OBESITY (HCC): Status: ACTIVE | Noted: 2019-10-29

## 2019-10-29 PROBLEM — J96.22 ACUTE ON CHRONIC RESPIRATORY FAILURE WITH HYPOXIA AND HYPERCAPNIA (HCC): Status: ACTIVE | Noted: 2019-10-29

## 2019-10-29 PROBLEM — I95.9 HYPOTENSION: Status: ACTIVE | Noted: 2019-10-29

## 2019-10-29 PROBLEM — E83.42 HYPOMAGNESEMIA: Status: ACTIVE | Noted: 2019-10-29

## 2019-10-29 PROBLEM — R09.2 RESPIRATORY ARREST (HCC): Status: ACTIVE | Noted: 2019-10-28

## 2019-10-29 PROBLEM — J96.21 ACUTE ON CHRONIC RESPIRATORY FAILURE WITH HYPOXIA AND HYPERCAPNIA (HCC): Status: ACTIVE | Noted: 2019-10-29

## 2019-10-29 PROBLEM — D69.6 THROMBOCYTOPENIA (HCC): Status: ACTIVE | Noted: 2019-10-29

## 2019-10-29 PROBLEM — G93.40 ENCEPHALOPATHY: Status: ACTIVE | Noted: 2019-10-29

## 2019-10-29 LAB
ACTION RANGE TRIGGERED IACRT: YES
ALBUMIN SERPL BCP-MCNC: 3.3 G/DL (ref 3.2–4.9)
ALBUMIN/GLOB SERPL: 1.1 G/DL
ALP SERPL-CCNC: 174 U/L (ref 30–99)
ALT SERPL-CCNC: 35 U/L (ref 2–50)
ANION GAP SERPL CALC-SCNC: 3 MMOL/L (ref 0–11.9)
ANION GAP SERPL CALC-SCNC: 3 MMOL/L (ref 0–11.9)
APPEARANCE FLD: CLEAR
AST SERPL-CCNC: 44 U/L (ref 12–45)
BASE EXCESS BLDA CALC-SCNC: 3 MMOL/L (ref -4–3)
BASOPHILS # BLD AUTO: 0.2 % (ref 0–1.8)
BASOPHILS # BLD: 0.02 K/UL (ref 0–0.12)
BILIRUB SERPL-MCNC: 0.9 MG/DL (ref 0.1–1.5)
BODY FLD TYPE: NORMAL
BODY TEMPERATURE: ABNORMAL DEGREES
BUN SERPL-MCNC: 21 MG/DL (ref 8–22)
BUN SERPL-MCNC: 22 MG/DL (ref 8–22)
CALCIUM SERPL-MCNC: 8.8 MG/DL (ref 8.5–10.5)
CALCIUM SERPL-MCNC: 9.8 MG/DL (ref 8.5–10.5)
CHLORIDE SERPL-SCNC: 101 MMOL/L (ref 96–112)
CHLORIDE SERPL-SCNC: 102 MMOL/L (ref 96–112)
CO2 BLDA-SCNC: 31 MMOL/L (ref 20–33)
CO2 SERPL-SCNC: 29 MMOL/L (ref 20–33)
CO2 SERPL-SCNC: 33 MMOL/L (ref 20–33)
COLOR FLD: COLORLESS
CORTIS SERPL-MCNC: 26.4 UG/DL (ref 0–23)
CREAT SERPL-MCNC: 0.8 MG/DL (ref 0.5–1.4)
CREAT SERPL-MCNC: 0.92 MG/DL (ref 0.5–1.4)
EKG IMPRESSION: NORMAL
EOSINOPHIL # BLD AUTO: 0 K/UL (ref 0–0.51)
EOSINOPHIL NFR BLD: 0 % (ref 0–6.9)
ERYTHROCYTE [DISTWIDTH] IN BLOOD BY AUTOMATED COUNT: 51.6 FL (ref 35.9–50)
GLOBULIN SER CALC-MCNC: 2.9 G/DL (ref 1.9–3.5)
GLUCOSE SERPL-MCNC: 153 MG/DL (ref 65–99)
GLUCOSE SERPL-MCNC: 173 MG/DL (ref 65–99)
GRAM STN SPEC: NORMAL
GRAM STN SPEC: NORMAL
HCO3 BLDA-SCNC: 29.5 MMOL/L (ref 17–25)
HCT VFR BLD AUTO: 50.3 % (ref 37–47)
HGB BLD-MCNC: 15 G/DL (ref 12–16)
HOROWITZ INDEX BLDA+IHG-RTO: 90 MM[HG]
IMM GRANULOCYTES # BLD AUTO: 0.07 K/UL (ref 0–0.11)
IMM GRANULOCYTES NFR BLD AUTO: 0.6 % (ref 0–0.9)
INST. QUALIFIED PATIENT IIQPT: YES
LV EJECT FRACT  99904: 55
LV EJECT FRACT MOD 2C 99903: 76.34
LV EJECT FRACT MOD 4C 99902: 68.59
LV EJECT FRACT MOD BP 99901: 72.5
LYMPHOCYTES # BLD AUTO: 0.54 K/UL (ref 1–4.8)
LYMPHOCYTES NFR BLD: 4.4 % (ref 22–41)
MCH RBC QN AUTO: 31.4 PG (ref 27–33)
MCHC RBC AUTO-ENTMCNC: 29.8 G/DL (ref 33.6–35)
MCV RBC AUTO: 105.5 FL (ref 81.4–97.8)
MONOCYTES # BLD AUTO: 0.56 K/UL (ref 0–0.85)
MONOCYTES NFR BLD AUTO: 4.6 % (ref 0–13.4)
NEUTROPHILS # BLD AUTO: 11.04 K/UL (ref 2–7.15)
NEUTROPHILS NFR BLD: 90.2 % (ref 44–72)
NRBC # BLD AUTO: 0 K/UL
NRBC BLD-RTO: 0 /100 WBC
O2/TOTAL GAS SETTING VFR VENT: 60 %
PCO2 BLDA: 51.2 MMHG (ref 26–37)
PCO2 TEMP ADJ BLDA: 49.6 MMHG (ref 26–37)
PH BLDA: 7.37 [PH] (ref 7.4–7.5)
PH TEMP ADJ BLDA: 7.38 [PH] (ref 7.4–7.5)
PLATELET # BLD AUTO: 134 K/UL (ref 164–446)
PMV BLD AUTO: 10.6 FL (ref 9–12.9)
PO2 BLDA: 54 MMHG (ref 64–87)
PO2 TEMP ADJ BLDA: 51 MMHG (ref 64–87)
POTASSIUM SERPL-SCNC: 4.9 MMOL/L (ref 3.6–5.5)
POTASSIUM SERPL-SCNC: 7 MMOL/L (ref 3.6–5.5)
PROT SERPL-MCNC: 6.2 G/DL (ref 6–8.2)
RBC # BLD AUTO: 4.77 M/UL (ref 4.2–5.4)
RBC # FLD: NORMAL CELLS/UL
SAO2 % BLDA: 86 % (ref 93–99)
SIGNIFICANT IND 70042: NORMAL
SIGNIFICANT IND 70042: NORMAL
SITE SITE: NORMAL
SITE SITE: NORMAL
SODIUM SERPL-SCNC: 134 MMOL/L (ref 135–145)
SODIUM SERPL-SCNC: 137 MMOL/L (ref 135–145)
SOURCE SOURCE: NORMAL
SOURCE SOURCE: NORMAL
SPECIMEN DRAWN FROM PATIENT: ABNORMAL
T4 FREE SERPL-MCNC: 0.26 NG/DL (ref 0.53–1.43)
TROPONIN T SERPL-MCNC: 44 NG/L (ref 6–19)
TSH SERPL DL<=0.005 MIU/L-ACNC: 18.45 UIU/ML (ref 0.38–5.33)
WBC # BLD AUTO: 12.2 K/UL (ref 4.8–10.8)
WBC # FLD: NORMAL CELLS/UL

## 2019-10-29 PROCEDURE — 94003 VENT MGMT INPAT SUBQ DAY: CPT

## 2019-10-29 PROCEDURE — A9270 NON-COVERED ITEM OR SERVICE: HCPCS | Performed by: INTERNAL MEDICINE

## 2019-10-29 PROCEDURE — 89051 BODY FLUID CELL COUNT: CPT

## 2019-10-29 PROCEDURE — 94150 VITAL CAPACITY TEST: CPT

## 2019-10-29 PROCEDURE — 84484 ASSAY OF TROPONIN QUANT: CPT

## 2019-10-29 PROCEDURE — 700102 HCHG RX REV CODE 250 W/ 637 OVERRIDE(OP): Performed by: INTERNAL MEDICINE

## 2019-10-29 PROCEDURE — 80053 COMPREHEN METABOLIC PANEL: CPT

## 2019-10-29 PROCEDURE — 0B9F8ZX DRAINAGE OF RIGHT LOWER LUNG LOBE, VIA NATURAL OR ARTIFICIAL OPENING ENDOSCOPIC, DIAGNOSTIC: ICD-10-PCS | Performed by: INTERNAL MEDICINE

## 2019-10-29 PROCEDURE — 93005 ELECTROCARDIOGRAM TRACING: CPT | Performed by: INTERNAL MEDICINE

## 2019-10-29 PROCEDURE — 85025 COMPLETE CBC W/AUTO DIFF WBC: CPT

## 2019-10-29 PROCEDURE — 700105 HCHG RX REV CODE 258: Performed by: INTERNAL MEDICINE

## 2019-10-29 PROCEDURE — 93306 TTE W/DOPPLER COMPLETE: CPT | Mod: 26 | Performed by: INTERNAL MEDICINE

## 2019-10-29 PROCEDURE — 93010 ELECTROCARDIOGRAM REPORT: CPT | Performed by: INTERNAL MEDICINE

## 2019-10-29 PROCEDURE — 87205 SMEAR GRAM STAIN: CPT | Mod: 91

## 2019-10-29 PROCEDURE — 94640 AIRWAY INHALATION TREATMENT: CPT

## 2019-10-29 PROCEDURE — 36600 WITHDRAWAL OF ARTERIAL BLOOD: CPT

## 2019-10-29 PROCEDURE — 700111 HCHG RX REV CODE 636 W/ 250 OVERRIDE (IP): Performed by: INTERNAL MEDICINE

## 2019-10-29 PROCEDURE — 700101 HCHG RX REV CODE 250: Performed by: INTERNAL MEDICINE

## 2019-10-29 PROCEDURE — 93010 ELECTROCARDIOGRAM REPORT: CPT | Mod: 77 | Performed by: INTERNAL MEDICINE

## 2019-10-29 PROCEDURE — 700111 HCHG RX REV CODE 636 W/ 250 OVERRIDE (IP)

## 2019-10-29 PROCEDURE — 87070 CULTURE OTHR SPECIMN AEROBIC: CPT

## 2019-10-29 PROCEDURE — 84443 ASSAY THYROID STIM HORMONE: CPT

## 2019-10-29 PROCEDURE — 82803 BLOOD GASES ANY COMBINATION: CPT

## 2019-10-29 PROCEDURE — 99233 SBSQ HOSP IP/OBS HIGH 50: CPT | Performed by: INTERNAL MEDICINE

## 2019-10-29 PROCEDURE — 31624 DX BRONCHOSCOPE/LAVAGE: CPT | Performed by: INTERNAL MEDICINE

## 2019-10-29 PROCEDURE — 71045 X-RAY EXAM CHEST 1 VIEW: CPT

## 2019-10-29 PROCEDURE — 93306 TTE W/DOPPLER COMPLETE: CPT

## 2019-10-29 PROCEDURE — 770022 HCHG ROOM/CARE - ICU (200)

## 2019-10-29 PROCEDURE — 99291 CRITICAL CARE FIRST HOUR: CPT | Mod: 25 | Performed by: INTERNAL MEDICINE

## 2019-10-29 PROCEDURE — 80048 BASIC METABOLIC PNL TOTAL CA: CPT

## 2019-10-29 PROCEDURE — 31645 BRNCHSC W/THER ASPIR 1ST: CPT | Performed by: INTERNAL MEDICINE

## 2019-10-29 PROCEDURE — 302978 HCHG BRONCHOSCOPY-DIAGNOSTIC

## 2019-10-29 PROCEDURE — 0B9G8ZX DRAINAGE OF LEFT UPPER LUNG LOBE, VIA NATURAL OR ARTIFICIAL OPENING ENDOSCOPIC, DIAGNOSTIC: ICD-10-PCS | Performed by: INTERNAL MEDICINE

## 2019-10-29 PROCEDURE — 84439 ASSAY OF FREE THYROXINE: CPT

## 2019-10-29 PROCEDURE — 82533 TOTAL CORTISOL: CPT

## 2019-10-29 RX ORDER — METOPROLOL TARTRATE 1 MG/ML
5 INJECTION, SOLUTION INTRAVENOUS EVERY 6 HOURS PRN
Status: DISCONTINUED | OUTPATIENT
Start: 2019-10-29 | End: 2019-11-13

## 2019-10-29 RX ORDER — IPRATROPIUM BROMIDE AND ALBUTEROL SULFATE 2.5; .5 MG/3ML; MG/3ML
3 SOLUTION RESPIRATORY (INHALATION)
Status: DISCONTINUED | OUTPATIENT
Start: 2019-10-29 | End: 2019-10-31

## 2019-10-29 RX ORDER — FAMOTIDINE 20 MG/1
20 TABLET, FILM COATED ORAL EVERY 12 HOURS
Status: DISCONTINUED | OUTPATIENT
Start: 2019-10-29 | End: 2019-10-30

## 2019-10-29 RX ORDER — IPRATROPIUM BROMIDE AND ALBUTEROL SULFATE 2.5; .5 MG/3ML; MG/3ML
3 SOLUTION RESPIRATORY (INHALATION) EVERY 4 HOURS PRN
Refills: 0 | Status: ON HOLD | COMMUNITY
Start: 2019-10-04 | End: 2019-11-19

## 2019-10-29 RX ORDER — PREDNISONE 20 MG/1
40 TABLET ORAL DAILY
Status: DISCONTINUED | OUTPATIENT
Start: 2019-10-29 | End: 2019-10-31

## 2019-10-29 RX ORDER — PHENYLEPHRINE HCL IN 0.9% NACL 0.5 MG/5ML
SYRINGE (ML) INTRAVENOUS
Status: COMPLETED
Start: 2019-10-29 | End: 2019-10-29

## 2019-10-29 RX ORDER — SODIUM CHLORIDE 9 MG/ML
500 INJECTION, SOLUTION INTRAVENOUS ONCE
Status: ACTIVE | OUTPATIENT
Start: 2019-10-29 | End: 2019-10-30

## 2019-10-29 RX ADMIN — IPRATROPIUM BROMIDE AND ALBUTEROL SULFATE 3 ML: .5; 3 SOLUTION RESPIRATORY (INHALATION) at 15:02

## 2019-10-29 RX ADMIN — IPRATROPIUM BROMIDE AND ALBUTEROL SULFATE 3 ML: .5; 3 SOLUTION RESPIRATORY (INHALATION) at 18:32

## 2019-10-29 RX ADMIN — IPRATROPIUM BROMIDE AND ALBUTEROL SULFATE 3 ML: .5; 3 SOLUTION RESPIRATORY (INHALATION) at 11:59

## 2019-10-29 RX ADMIN — FENTANYL CITRATE 100 MCG: 50 INJECTION, SOLUTION INTRAMUSCULAR; INTRAVENOUS at 14:32

## 2019-10-29 RX ADMIN — PROPOFOL 10 MCG/KG/MIN: 10 INJECTION, EMULSION INTRAVENOUS at 14:27

## 2019-10-29 RX ADMIN — Medication 200 MCG: at 00:30

## 2019-10-29 RX ADMIN — FAMOTIDINE 20 MG: 20 TABLET ORAL at 17:22

## 2019-10-29 RX ADMIN — PROPOFOL 10 MCG/KG/MIN: 10 INJECTION, EMULSION INTRAVENOUS at 23:58

## 2019-10-29 RX ADMIN — PREDNISONE 40 MG: 20 TABLET ORAL at 11:18

## 2019-10-29 RX ADMIN — NOREPINEPHRINE BITARTRATE 8 MCG/MIN: 1 INJECTION INTRAVENOUS at 00:15

## 2019-10-29 RX ADMIN — FAMOTIDINE 20 MG: 10 INJECTION INTRAVENOUS at 05:53

## 2019-10-29 RX ADMIN — IPRATROPIUM BROMIDE AND ALBUTEROL SULFATE 3 ML: .5; 3 SOLUTION RESPIRATORY (INHALATION) at 22:09

## 2019-10-29 RX ADMIN — FENTANYL CITRATE 100 MCG: 50 INJECTION, SOLUTION INTRAMUSCULAR; INTRAVENOUS at 20:48

## 2019-10-29 RX ADMIN — SODIUM PHOSPHATE, MONOBASIC, MONOHYDRATE AND SODIUM PHOSPHATE, DIBASIC, ANHYDROUS 15 MMOL: 276; 142 INJECTION, SOLUTION INTRAVENOUS at 14:16

## 2019-10-29 RX ADMIN — FENTANYL CITRATE 100 MCG: 50 INJECTION, SOLUTION INTRAMUSCULAR; INTRAVENOUS at 00:07

## 2019-10-29 RX ADMIN — SENNOSIDES AND DOCUSATE SODIUM 2 TABLET: 8.6; 5 TABLET ORAL at 18:11

## 2019-10-29 RX ADMIN — ENOXAPARIN SODIUM 40 MG: 100 INJECTION SUBCUTANEOUS at 17:24

## 2019-10-29 RX ADMIN — LEVOTHYROXINE SODIUM 175 MCG: 75 TABLET ORAL at 06:26

## 2019-10-29 ASSESSMENT — PULMONARY FUNCTION TESTS
FVC: .64
FVC: .6

## 2019-10-29 NOTE — ASSESSMENT & PLAN NOTE
Tolerating po intake  Continue current diet  Contributory to likely HALIE and chronic respiratory failure

## 2019-10-29 NOTE — ED NOTES
Bedside report given to RN. RN x2 and RT transported monitored and vented pt. All belongings and chart with pt.

## 2019-10-29 NOTE — ED NOTES
Unable to complete med rec. Pt is unable to participate at this time. Med tech will attempt to follow up with pharmacy tomorrow.

## 2019-10-29 NOTE — PROGRESS NOTES
0007- MD Ignacio at bedside performing bronchoscopy. Time out performed. Consent complete by MD.  0017- Bronchoscopy complete. VSS. Samples sent to lab, follow up CXR ordered.

## 2019-10-29 NOTE — PROGRESS NOTES
Med rec complete per [facility] pharmacy  Allergies reviewed historically    These were the only 3 medications [facility] had filled for her, I asked if they saw a Lovenox anywhere and they stated no.

## 2019-10-29 NOTE — CONSULTS
Cardiology Consult Note:    Eliu To  Date & Time note created:    10/28/2019   9:44 PM     Referring MD:  Dr. Decker    Patient ID:   Name:             Cheryl Bajwa   YOB: 1950  Age:                 69 y.o.  female   MRN:               [id number]                                                             Chief Complaint / Reason for consult:  PEA arrest, V tach.    History of Present Illness:    This is a 69 years old woman with no prior known cardiac history presented to the hospital via EMS.  Patient was found to be in PEA arrest by EMS.  CPR was performed with epinephrine given.  Patient then developed ventricular tachycardia and she was shocked on field.  Patient was intubated.  At this time, she is not on any sedation but she does not have any meaningful or purposeful movements or reflexes.    Review of Systems:      Patient is intubated without purposeful movements and reflexes.                Past Medical History:   Past Medical History:   Diagnosis Date   • Cataract    • Chronic obstructive pulmonary disease (HCC)    • Fall    • Hypothyroid    • Mitral valve disease    • PEA (Pulseless electrical activity) ([facility]) 10/28/2019   • Ventricular tachycardia ([facility]) 10/28/2019     Active Hospital Problems    Diagnosis   • PEA (Pulseless electrical activity) ([facility]) [I46.9]   • Ventricular tachycardia ([facility]) [I47.2]       Past Surgical History:  Past Surgical History:   Procedure Laterality Date   • GYN SURGERY       x2       Hospital Medications:    Current Facility-Administered Medications:   •  NOREPINEPHRINE BITARTRATE 1 MG/ML IV SOLN, , , ,   •  amiodarone (CORDARONE) 450 mg in D5W 250 mL Infusion, 0.5-1 mg/min, Intravenous, Continuous, Sami Decker M.D.  •  Cold NS infusion 3,400 mL, 20 mL/kg, Intravenous, Once, Ruddy Briscoe Jr., D.O.  •  Cold NS infusion 1,700 mL, 10 mL/kg, Intravenous, Once PRN, Ruddy Briscoe Jr., D.O.  •  NS infusion, , Intravenous, Continuous,  ANITHA Nails Jr..MARBIN.  •  magnesium sulfate 20 g/500mL infusion, 0.5-2 g/hr, Intravenous, Continuous, Ruddy Briscoe Jr., D.O.  •  meperidine (DEMEROL) injection 25 mg, 25 mg, Intravenous, Q HOUR PRN, ANITHA Nails Jr..O.  •  fentaNYL (SUBLIMAZE) injection 100 mcg, 100 mcg, Intravenous, Q HOUR PRN, ANITHA Nails Jr..O.  •  fentanyl 50 mcg/mL infusion,  mcg/hr, Intravenous, Continuous, ANITHA Nails Jr..O.  •  MD Chery...PROPOFOL CRITICAL CARE PROTOCOL 1 Each, 1 Each, Other, PRN, ANITHA Nails Jr..O.  •  midazolam (VERSED) 2 MG/2ML injection 2 mg, 2 mg, Intravenous, Once **FOLLOWED BY** midazolam (VERSED) premix 125 mg/125 mL NS, 0-10 mg/hr, Intravenous, Continuous, ANITHA Nails Jr..O.  •  vecuronium (NORCURON) injection 17 mg, 0.1 mg/kg, Intravenous, Once **FOLLOWED BY** vecuronium (NORCURON) 60 mg in D5W 500 mL Infusion, 1-2 mcg/kg/min, Intravenous, Continuous, ANITHA Nails Jr..O.  •  insulin regular (HUMULIN R) injection 0-14 Units, 0-14 Units, Intravenous, Once PRN **AND** insulin regular human (HUMULIN/NOVOLIN R) 62.5 Units in  mL infusion per protocol, 0-29 Units/hr, Intravenous, PRN **AND** Accu-Chek (FSBG), , , PRN **AND** DEXTROSE 10% BOLUS 125-250 mL, 125-250 mL, Intravenous, PRN, ANITHA Nails Jr..O.  •  artificial tears (EYE LUBRICANT) ophth ointment 1 Application, 1 Application, Both Eyes, Q8HRS, ANITHA Nails Jr..O.  •  norepinephrine (LEVOPHED) 8 mg in  mL Infusion, 0-30 mcg/min, Intravenous, Continuous, ANITHA Nails Jr..O.  •  [START ON 10/29/2019] K+ Scale: Goal of 4.5, 1 Each, Intravenous, Q6HRS, Ruddy Briscoe Jr., D.O.  •  sodium phosphate 15 mmol in D5W 250 mL ivpb, 15 mmol, Intravenous, Once PRN **OR** sodium phosphate 30 mmol in D5W 500 mL ivpb, 30 mmol, Intravenous, Once PRN, Ruddy Briscoe Jr., D.O.    Current Outpatient Medications:   •  levothyroxine (SYNTHROID) 137 MCG Tab, Take 1 Tab by mouth Every morning on an  empty stomach., Disp: 30 Tab, Rfl:   •  furosemide (LASIX) 20 MG Tab, Take 1 Tab by mouth every day., Disp: 60 Tab, Rfl:   •  loperamide (IMODIUM) 2 MG Cap, Take 1 Cap by mouth 4 times a day as needed for Diarrhea., Disp: 30 Cap, Rfl:   •  enoxaparin (LOVENOX) 30 MG/0.3ML Solution inj, Inject 0.3 mL as instructed every 12 hours., Disp: , Rfl:   •  Cyanocobalamin (B-12 PO), Take 1 Dose by mouth every day. Unknown OTC Strength, Disp: , Rfl:   •  albuterol 108 (90 Base) MCG/ACT Aero Soln inhalation aerosol, Inhale 2 Puffs by mouth every 6 hours as needed for Shortness of Breath., Disp: , Rfl:   •  nadolol (CORGARD) 40 MG Tab, Take 40 mg by mouth every day., Disp: , Rfl:   •  HYDROcodone-acetaminophen (NORCO) 5-325 MG Tab per tablet, Take 1 Tab by mouth every day. Scheduled., Disp: , Rfl:     Current Outpatient Medications:    (Not in a hospital admission)    Medication Allergy:  Allergies   Allergen Reactions   • Doxycycline      headache   • Penicillins      D/W Patient 06/2019 - unknown rxn as a kid; states she tolerated Keflex  -Tolerated Ceftriaxone inpatient   • Sulfa Drugs        Family History:  No family history on file.    Social History:  Social History     Socioeconomic History   • Marital status: Single     Spouse name: Not on file   • Number of children: Not on file   • Years of education: Not on file   • Highest education level: Not on file   Occupational History   • Not on file   Social Needs   • Financial resource strain: Not on file   • Food insecurity:     Worry: Not on file     Inability: Not on file   • Transportation needs:     Medical: Not on file     Non-medical: Not on file   Tobacco Use   • Smoking status: Former Smoker     Types: Cigarettes     Last attempt to quit: 2016     Years since quitting: 3.8   • Smokeless tobacco: Never Used   Substance and Sexual Activity   • Alcohol use: No   • Drug use: No   • Sexual activity: Not on file   Lifestyle   • Physical activity:     Days per week: Not on  "file     Minutes per session: Not on file   • Stress: Not on file   Relationships   • Social connections:     Talks on phone: Not on file     Gets together: Not on file     Attends [employer] service: Not on file     Active member of club or organization: Not on file     Attends meetings of clubs or organizations: Not on file     Relationship status: Not on file   • Intimate partner violence:     Fear of current or ex partner: Not on file     Emotionally abused: Not on file     Physically abused: Not on file     Forced sexual activity: Not on file   Other Topics Concern   • Not on file   Social History Narrative   • Not on file         Physical Exam:  Vitals/ General Appearance:   Weight/BMI: Body mass index is 58.7 kg/m².  /76   Pulse 61   Temp (!) 35.5 °C (95.9 °F) (Temporal)   Ht 1.702 m (5' 7\")   Wt (!) 170 kg (374 lb 12.5 oz)   SpO2 100%   Vitals:    10/28/19 2003 10/28/19 2015 10/28/19 2030 10/28/19 2045   BP:  131/89 115/83 116/76   Pulse:  61 61 61   Temp:       TempSrc:       SpO2:  94% 96% 100%   Weight: (!) 170 kg (374 lb 12.5 oz)      Height: 1.702 m (5' 7\")        Oxygen Therapy:  Pulse Oximetry: 100 %, O2 Delivery: None (Room Air)    Constitutional:   Intubated status  HENMT:  Trach tube is in place properly  Eyes: No discharge.  Neck:  Unable to assess for JVD due to intubated status  Cardiovascular: regular rhythm, No murmurs, No rubs, No gallops.   Extremities with no edema   Lungs:  +BS anteriorly  Abdomen: no distention  Skin: Warm  Neurologic: intubated status  Psychiatric: intubated status        MDM (Data Review):     Records reviewed and summarized in current documentation    Lab Data Review:  Recent Results (from the past 24 hour(s))   EKG (NOW)    Collection Time: 10/28/19  7:38 PM   Result Value Ref Range    Report       [facility] Emergency Dept.    Test Date:  2019-10-28  Pt Name:    XANDER HOLLY                 Department: ER  MRN:        [id number]             "          Room:       [facility]  Gender:     Female                       Technician: [id number]  :        1950                   Requested By:ER TRIAGE PROTOCOL  Order #:    [id number]                    Reading MD: PAULA GARBER MD    Measurements  Intervals                                Axis  Rate:       56                           P:          22  WV:         184                          QRS:        -158  QRSD:       132                          T:          43  QT:         488  QTc:        472    Interpretive Statements  12 Lead EKG interpreted by me to show: -- Rate 56 -- Rhythm: Normal sinus  rhythm  -- Axis: Normal -- WV and QRS Intervals: RBBB -- T waves: No acute changes --  ST  segments: No acute changes -- Ectopy: None. My impression of this EKG: Does  not  indicate acute ischemia at this time.  Electronically S igned On 10- 21:09:35 PDT by PAULA GARBER MD     CBC WITH DIFFERENTIAL    Collection Time: 10/28/19  7:40 PM   Result Value Ref Range    WBC 12.3 (H) 4.8 - 10.8 K/uL    RBC 4.57 4.20 - 5.40 M/uL    Hemoglobin 14.3 12.0 - 16.0 g/dL    Hematocrit 49.3 (H) 37.0 - 47.0 %    .9 (H) 81.4 - 97.8 fL    MCH 31.3 27.0 - 33.0 pg    MCHC 29.0 (L) 33.6 - 35.0 g/dL    RDW 52.5 (H) 35.9 - 50.0 fL    Platelet Count 120 (L) 164 - 446 K/uL    MPV 10.7 9.0 - 12.9 fL    Neutrophils-Polys 90.40 (H) 44.00 - 72.00 %    Lymphocytes 1.80 (L) 22.00 - 41.00 %    Monocytes 1.70 0.00 - 13.40 %    Eosinophils 0.00 0.00 - 6.90 %    Basophils 0.00 0.00 - 1.80 %    Nucleated RBC 0.00 /100 WBC    Neutrophils (Absolute) 11.87 (H) 2.00 - 7.15 K/uL    Lymphs (Absolute) 0.22 (L) 1.00 - 4.80 K/uL    Monos (Absolute) 0.21 0.00 - 0.85 K/uL    Eos (Absolute) 0.00 0.00 - 0.51 K/uL    Baso (Absolute) 0.00 0.00 - 0.12 K/uL    NRBC (Absolute) 0.00 K/uL    Anisocytosis 1+     Macrocytosis 2+    COMP METABOLIC PANEL    Collection Time: 10/28/19  7:40 PM   Result Value Ref Range    Sodium 142 135 - 145 mmol/L    Potassium  4.1 3.6 - 5.5 mmol/L    Chloride 105 96 - 112 mmol/L    Co2 34 (H) 20 - 33 mmol/L    Anion Gap 3.0 0.0 - 11.9    Glucose 172 (H) 65 - 99 mg/dL    Bun 19 8 - 22 mg/dL    Creatinine 0.81 0.50 - 1.40 mg/dL    Calcium 7.9 (L) 8.5 - 10.5 mg/dL    AST(SGOT) 53 (H) 12 - 45 U/L    ALT(SGPT) 37 2 - 50 U/L    Alkaline Phosphatase 163 (H) 30 - 99 U/L    Total Bilirubin 0.8 0.1 - 1.5 mg/dL    Albumin 2.9 (L) 3.2 - 4.9 g/dL    Total Protein 5.4 (L) 6.0 - 8.2 g/dL    Globulin 2.5 1.9 - 3.5 g/dL    A-G Ratio 1.2 g/dL   TROPONIN    Collection Time: 10/28/19  7:40 PM   Result Value Ref Range    Troponin T 25 (H) 6 - 19 ng/L   proBrain Natriuretic Peptide, NT    Collection Time: 10/28/19  7:40 PM   Result Value Ref Range    NT-proBNP 1832 (H) 0 - 125 pg/mL   ESTIMATED GFR    Collection Time: 10/28/19  7:40 PM   Result Value Ref Range    GFR If African American >60 >60 mL/min/1.73 m 2    GFR If Non African American >60 >60 mL/min/1.73 m 2   DIFFERENTIAL MANUAL    Collection Time: 10/28/19  7:40 PM   Result Value Ref Range    Bands-Stabs 6.10 0.00 - 10.00 %    Manual Diff Status PERFORMED    PERIPHERAL SMEAR REVIEW    Collection Time: 10/28/19  7:40 PM   Result Value Ref Range    Peripheral Smear Review see below    PLATELET ESTIMATE    Collection Time: 10/28/19  7:40 PM   Result Value Ref Range    Plt Estimation Decreased    MORPHOLOGY    Collection Time: 10/28/19  7:40 PM   Result Value Ref Range    RBC Morphology Present    ISTAT ARTERIAL BLOOD GAS    Collection Time: 10/28/19  7:47 PM   Result Value Ref Range    Ph 7.254 (LL) 7.400 - 7.500    Pco2 76.1 (HH) 26.0 - 37.0 mmHg    Po2 66 64 - 87 mmHg    Tco2 36 (H) 20 - 33 mmol/L    S02 88 (L) 93 - 99 %    Hco3 33.7 (H) 17.0 - 25.0 mmol/L    BE 3 -4 - 3 mmol/L    Body Temp 37.0 C degrees    O2 Therapy 100 %    iPF Ratio 66     Ph Temp Elder 7.254 (LL) 7.400 - 7.500    Pco2 Temp Co 76.1 (HH) 26.0 - 37.0 mmHg    Po2 Temp Cor 66 64 - 87 mmHg    Specimen Arterial     Action Range Triggered  YES     Inst. Qualified Patient YES        Imaging/Procedures Review:    Chest Xray:  Reviewed    EKG:   As in HPI.     MDM (Assessment and Plan):     Active Hospital Problems    Diagnosis   • PEA (Pulseless electrical activity) (HCC) [I46.9]   • Ventricular tachycardia (HCC) [I47.2]         At this time, patient is not a candidate for any further invasive cardiac testing or intervention.  Poor prognosis overall.  Will consider further cardiac work-up if patient wakes up from this event in future. Otherwise, any cardiac intervention is futile at this time.      Thank you for referring this patient to our cardiology service.    Eliu Moreira MD.   Cardiology Inpatient Service.  [facility].  [phone].  Herminia Novak.

## 2019-10-29 NOTE — PROGRESS NOTES
Cortrak Placement    Tube Team verified patient name and medical record number prior to tube placement.  Cortrak tube (43 inches, 10 Bulgarian) placed at 72 cm in left nare.  Per Cortrak picture, tube appears to be in the stomach.  Nursing Instructions: Awaiting KUB to confirm placement before use for medications or feeding. Once placement confirmed, flush tube with 30 ml of water, and then remove and save stylet, in patient medication drawer.

## 2019-10-29 NOTE — PROGRESS NOTES
Pt's went into Aflutter briefly at 1215 then returned to NSR Dr. Palacio notified. Rhythm continued to go in and out of Aflutter. Dr. Diaz with Cardiology came by to see pt, EKG performed.
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
## 2019-10-29 NOTE — CARE PLAN
Problem: Ventilation Defect:  Goal: Ability to achieve and maintain unassisted ventilation or tolerate decreased levels of ventilator support  Outcome: PROGRESSING AS EXPECTED   VD 2   7.5 @23     10/29/19 0246   [place] Vent   Walker Vent Yes   [place] Vent Mode APVCMV   [place] Conventional Settings   Rate (breaths/min) 26   Vt Target (mL) 370   TI (Seconds) 0.8   PEEP/CPAP 14   Pramp 50   FiO2 100   Sensitivity Setting Flow Trigger   Other Settings 5

## 2019-10-29 NOTE — PROGRESS NOTES
Patient admitted for [facility] unwitnessed unknown down time was found in PEA, however had VTach on transport.Was given 300mg ketamine. Was unresponsive on admission. Came to ER evaluated patient. Patient with cough, gag, withdrawal of all extremities, per nurse report was following commands and mouthing words, shaking head that she was in pain nurse gave versed and fentanyl prior to me coming to bedside.  Called Dr Briscoe he will come to bedside and access. Recommend urgent repeat cardiology evaluation with Inferior st changes and concerns for arrhythmia as etiology of arrest, trop flat. Chronic hypoxic hypercarbic respiratory failure. Blue bloater appearance. Echo with good LV function. b lines or left chest, right chest with minimal. Will repeat EKG now. Potassium and mag low will replace 20 kcl and 4 gram mag IV. Significant abnormal CXR with lose of lung volume on right small ptx likely will need bronchoscopy to rule out endobronchial lesion and collect respiratory cultures. CT chest reviewed. Discussed case with Vahe her brother in [facility] [place] [facility] [place] agrees with risk of procedure and isabel while intubated for bronchoscopy especially with right small ptx. Will hold hypothermia protocol since is intact following commands. Will continue with levophed for Map > 65. Use propofol and fentanyl for sedation on ventilator. Some post bronchoscopy hypotension norepi to 20 propofol and amio held, neosynephrine 200mcg IV given bradycardia to 45, ekg and CXR ordered.     Called again and went to bedside K of 7.0 with improved bicarb, renal function. Will repeat BMP, give IV calcium, IV glucose and IV insulin and get EKG and NS bolus.     EKG reviewed QRS not wide  Repeat K 4.9  Cortisol 26  FT4 low 0.26, TSH 18 will increase from her chronic dose of 137 to 175 mcg/day oral for bradycardia, mild hypothermia, hypercarbia  Dropped FI02 to 60% from 80% sat > 98%      Patient remains in critical condition from post cardiac arrest respiratory  failure and shock on norepinephrine gtt with active titration and critical hyperkalemia and hypoxemia. Critical care time provided was 84 minutes in addition to Dr Briscoe 85 minutes. This excludes all separate billable procedures.

## 2019-10-29 NOTE — CARE PLAN
Problem: Nutritional:  Goal: Nutrition support tolerated and meeting greater than 85% of estimated needs  Outcome: NOT MET     See RD note.
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
## 2019-10-29 NOTE — PROGRESS NOTES
Reason for consultation /admission : s/p cardiopulmonary arrest (PEA/VT)    Intubated, calm sedated  On high FiO2, O2 sat low 90  Hemodynamically stable    Monitor reviewed by me showed no recurrent VT overnight with intermittent atrial arrhythmias prob MAT but with difficult to discern on rhythm strips due to sig baseline artifacts.  Ventricular rate during hose episodes were relatively normal    Review of systems; unobtainable due to pt's condition      Temp:  [35.1 °C (95.2 °F)-36.8 °C (98.2 °F)] 36.3 °C (97.4 °F)  Pulse:  [] 79  Resp:  [21-27] 25  BP: ()/(51-91) 103/87  SpO2:  [82 %-100 %] 94 %  GENERAL not in acute distress, not dyspnic at rest, obese  Head atraumatic, normocephalic  Eyes intubated, sedated  ENT neck supple, no JVD, no carotid bruits or thyromegaly  Lung good expansion, distant sound, no rales or wheezing  Heart distant sound, irregular, normal rate, no murmur,   no gallop or rub  Abd soft, no tenderness, mass or bruits  Ext 2-3+ leg edema  Skin no ecchymosis or petechiae  Musculoskeletal no deformity  Neuro alert, following simple command  Psych unable to assess    Labs: Cr 0.9, K+ 4.9    Tn T 40, 44    EKG this AM by my review;  SINUS RHYTHM   BORDERLINE AV CONDUCTION DELAY   LEFT ATRIAL ABNORMALITY   LOW VOLTAGE THROUGHOUT    ECHO in June was normal    Assessment and plans    1. S/p OOH arrest initial rhythm was PEA with subsequent ventricular tachycardia  No recurrent VT since admission on no antiarrhythmic  Troponin and EKG did not suggest acute coronary event  More likely respiratory arrest by history  We will repeat echocardiography    2.  Respiratory failure/COPD  Per intensivist    3.  Borderline elevated troponin likely secondary to acute stress/prolonged hypoxemia    4. Atrial tachyarrhythmias, ?MAT likely at least in part due to #2   Rate mostly in normal range, metoprolol IV for high rate as BP allows  Will start anticoagulation if in AF   Check EKG    Will  follow    Please note that this dictation was created using voice recognition software. I have worked with consultants from the vendor as well as technical experts from [facility] to optimize the interface. I have made every reasonable attempt to correct obvious errors, but I expect that there are errors of grammar and possibly content I did not discover before finalizing the note

## 2019-10-29 NOTE — ASSESSMENT & PLAN NOTE
Resolved  Secondary to profound hypoxia, respiratory compromise  Negative for pulmonary embolism  Chronically oxygen dependent (on 3L at baseline)  Pulmonary critical care signed off

## 2019-10-29 NOTE — ED PROVIDER NOTES
ED Provider Note    CHIEF COMPLAINT  Chief Complaint   Patient presents with   • Shortness of Breath       HPI  Cheryl Bajwa is a 69 y.o. female who presents via transfer intubated for acute respiratory failure and cardiac arrest.  The patient has a history of COPD, apparently her oxygen concentrator failed during a power outage, she was brought to the outside emergency department after being found obtunded and hypoxic, had an ABG revealing an acidosis with a pH of 7.0 and a CO2 by report of 160.  Sometime after being intubated she went in the cardiac arrest, underwent 1 round of compressions and epinephrine and they were able to get pulses back.  During transport here the patient had a second arrest that was noted to be a V. fib arrest, defibrillated electrically and had pulses.  The patient is unable to provide any history and at this time no other information is available.  They did treat her with a breathing treatments and some antibiotics at the outside facility.    REVIEW OF SYSTEMS  Unobtainable secondary to clinical condition    PAST MEDICAL HISTORY  Past Medical History:   Diagnosis Date   • Cataract    • Chronic obstructive pulmonary disease (HCC)    • Fall    • Hypothyroid    • Mitral valve disease        FAMILY HISTORY  No family history on file.    SOCIAL HISTORY  Social History     Tobacco Use   • Smoking status: Former Smoker     Types: Cigarettes     Last attempt to quit: 2016     Years since quitting: 3.8   • Smokeless tobacco: Never Used   Substance Use Topics   • Alcohol use: No   • Drug use: No       SURGICAL HISTORY  Past Surgical History:   Procedure Laterality Date   • GYN SURGERY       x2       CURRENT MEDICATIONS  I personally reviewed the medication list in the charting documentation.     ALLERGIES  Allergies   Allergen Reactions   • Doxycycline      headache   • Penicillins      D/W Patient 2019 - unknown rxn as a kid; states she tolerated Keflex  -Tolerated Ceftriaxone  "inpatient   • Sulfa Drugs        MEDICAL RECORD  I have reviewed patient's medical record and pertinent results are listed above.      PHYSICAL EXAM  VITAL SIGNS: /86   Pulse 74   Temp (!) 35.5 °C (95.9 °F) (Temporal)   Ht 1.702 m (5' 7\")   Wt (!) 170 kg (374 lb 12.5 oz)   SpO2 97%   BMI 58.70 kg/m²    Constitutional: Intubated, unresponsive  HENT: ET tube in oropharynx  Eyes: Pupils are equal and reactive.  Neck: Supple.   Cardiovascular: Regular heart rate and rhythm.   Thorax & Lungs: Symmetric air movement bilaterally with ventilation  Abdomen: Soft, obese  Skin: Warm, dry. No rash appreciated  Extremities/Musculoskeletal: No asymmetric edema or erythema of the lower extremities.  Neurologic: Obtunded, on no sedative medication and has no reaction to pain    DIAGNOSTIC STUDIES / PROCEDURES    LABS/EKGs  Results for orders placed or performed during the hospital encounter of 10/28/19   CBC WITH DIFFERENTIAL   Result Value Ref Range    WBC 12.3 (H) 4.8 - 10.8 K/uL    RBC 4.57 4.20 - 5.40 M/uL    Hemoglobin 14.3 12.0 - 16.0 g/dL    Hematocrit 49.3 (H) 37.0 - 47.0 %    .9 (H) 81.4 - 97.8 fL    MCH 31.3 27.0 - 33.0 pg    MCHC 29.0 (L) 33.6 - 35.0 g/dL    RDW 52.5 (H) 35.9 - 50.0 fL    Platelet Count 120 (L) 164 - 446 K/uL    MPV 10.7 9.0 - 12.9 fL    Neutrophils-Polys 90.40 (H) 44.00 - 72.00 %    Lymphocytes 1.80 (L) 22.00 - 41.00 %    Monocytes 1.70 0.00 - 13.40 %    Eosinophils 0.00 0.00 - 6.90 %    Basophils 0.00 0.00 - 1.80 %    Nucleated RBC 0.00 /100 WBC    Neutrophils (Absolute) 11.87 (H) 2.00 - 7.15 K/uL    Lymphs (Absolute) 0.22 (L) 1.00 - 4.80 K/uL    Monos (Absolute) 0.21 0.00 - 0.85 K/uL    Eos (Absolute) 0.00 0.00 - 0.51 K/uL    Baso (Absolute) 0.00 0.00 - 0.12 K/uL    NRBC (Absolute) 0.00 K/uL    Anisocytosis 1+     Macrocytosis 2+    COMP METABOLIC PANEL   Result Value Ref Range    Sodium 142 135 - 145 mmol/L    Potassium 4.1 3.6 - 5.5 mmol/L    Chloride 105 96 - 112 mmol/L    Co2 34 " (H) 20 - 33 mmol/L    Anion Gap 3.0 0.0 - 11.9    Glucose 172 (H) 65 - 99 mg/dL    Bun 19 8 - 22 mg/dL    Creatinine 0.81 0.50 - 1.40 mg/dL    Calcium 7.9 (L) 8.5 - 10.5 mg/dL    AST(SGOT) 53 (H) 12 - 45 U/L    ALT(SGPT) 37 2 - 50 U/L    Alkaline Phosphatase 163 (H) 30 - 99 U/L    Total Bilirubin 0.8 0.1 - 1.5 mg/dL    Albumin 2.9 (L) 3.2 - 4.9 g/dL    Total Protein 5.4 (L) 6.0 - 8.2 g/dL    Globulin 2.5 1.9 - 3.5 g/dL    A-G Ratio 1.2 g/dL   TROPONIN   Result Value Ref Range    Troponin T 25 (H) 6 - 19 ng/L   proBrain Natriuretic Peptide, NT   Result Value Ref Range    NT-proBNP 1832 (H) 0 - 125 pg/mL   ESTIMATED GFR   Result Value Ref Range    GFR If African American >60 >60 mL/min/1.73 m 2    GFR If Non African American >60 >60 mL/min/1.73 m 2   DIFFERENTIAL MANUAL   Result Value Ref Range    Bands-Stabs 6.10 0.00 - 10.00 %    Manual Diff Status PERFORMED    PERIPHERAL SMEAR REVIEW   Result Value Ref Range    Peripheral Smear Review see below    PLATELET ESTIMATE   Result Value Ref Range    Plt Estimation Decreased    MORPHOLOGY   Result Value Ref Range    RBC Morphology Present    EKG (NOW)   Result Value Ref Range    Report       [facility] Emergency Dept.    Test Date:  2019-10-28  Pt Name:    XANDER HOLLY                 Department: ER  MRN:        [id number]                      Room:       RD 09  Gender:     Female                       Technician: [id number]  :        [id number]                   Requested By:ER TRIAGE PROTOCOL  Order #:    [id number]                    Reading MD: PAULA GARBER MD    Measurements  Intervals                                Axis  Rate:       56                           P:          22  UT:         184                          QRS:        -158  QRSD:       132                          T:          43  QT:         488  QTc:        472    Interpretive Statements  12 Lead EKG interpreted by me to show: -- Rate 56 -- Rhythm: Normal sinus  rhythm  -- Axis: Normal  -- MO and QRS Intervals: RBBB -- T waves: No acute changes --  ST  segments: No acute changes -- Ectopy: None. My impression of this EKG: Does  not  indicate acute ischemia at this time.  Electronically S igned On 10- 21:09:35 PDT by PAULA GARBER MD     ISTAT ARTERIAL BLOOD GAS   Result Value Ref Range    Ph 7.254 (LL) 7.400 - 7.500    Pco2 76.1 (HH) 26.0 - 37.0 mmHg    Po2 66 64 - 87 mmHg    Tco2 36 (H) 20 - 33 mmol/L    S02 88 (L) 93 - 99 %    Hco3 33.7 (H) 17.0 - 25.0 mmol/L    BE 3 -4 - 3 mmol/L    Body Temp 37.0 C degrees    O2 Therapy 100 %    iPF Ratio 66     Ph Temp Elder 7.254 (LL) 7.400 - 7.500    Pco2 Temp Co 76.1 (HH) 26.0 - 37.0 mmHg    Po2 Temp Cor 66 64 - 87 mmHg    Specimen Arterial     Action Range Triggered YES     Inst. Qualified Patient YES         RADIOLOGY  CT-CTA CHEST PULMONARY ARTERY W/ RECONS   Final Result      1.  No evidence of pulmonary emboli   2.  Extensive airspace opacities and atelectasis with marked volume loss in the right lung. A small right pleural effusion is also present, along with a tiny right pneumothorax.   3.  Patchy airspace opacities in the left upper lobe as well as left lower lobe atelectasis with tiny left pleural effusion.            DX-CHEST-PORTABLE (1 VIEW)   Final Result      Diffuse bilateral interstitial opacities, suggesting pulmonary edema. Additionally there are confluent and linear opacities throughout the right perihilar and upper lung field and in the left base, consistent with a combination of pneumonia and    atelectasis. All of this is considerably worse compared with the prior image.      DX-ABDOMEN FOR TUBE PLACEMENT    (Results Pending)               Central Line Placement Procedure Note  Indication: vascular access, central venous monitoring and centrally administered medications    Consent: Unable to be obtained due to the emergent nature of this procedure.    Procedure: The patient was positioned appropriately and the skin over the  right internal jugular vein was prepped with chlorhexidine, draped in a sterile fashion. Local anesthesia was not performed due to the emergent nature of this procedure.  A large bore needle was used to identify the vein.  A guide wire was then inserted into the vein through the needle. A triple lumen catheter was then inserted into the vessel over the guide wire using the Seldinger technique.  All ports showed good, free flowing blood return and were flushed with saline solution.  The catheter was then securely fastened to the skin with sutures and covered with a sterile dressing.  A post procedure X-ray was ordered and showed good line position.    The patient tolerated the procedure well.    Complications: None      COURSE & MEDICAL DECISION MAKING  I have reviewed any medical record information, laboratory studies and radiographic results as noted above.    Encounter Summary: This is a 69 y.o. female with hypoxic respiratory failure and cardiac arrest, transferred here after return of circulation twice, defibrillated once for a V. fib arrest, initially found unresponsive and hypoxic after her oxygen concentrator failed during a power outage.  She was requiring levophed so a central line was placed here in the emergency department.  She also was initiated on amiodarone given the V. fib arrest.  She had an elevated d-dimer so a CT scan was obtained and ruled out pulmonary embolism but revealed extensive airspace disease with a tiny pneumothorax likely related to the chest compressions.  Her ABG here in this emergency department is significantly improved from her prior one at the outside facility.  The patient has been evaluated by the ICU physician is being admitted to the hospital in critical condition.  Of note, she has not received any sedation since being here in the emergency department and has no response to painful stimulus and has shown no significant neurologic activity, concerning for anoxic brain  injury.  CT scan of her head will be obtained as the patient meets criteria for hypothermia    CRITICAL CARE  The very real possibilty of a deterioration of this patient's condition required the highest level of my preparedness for sudden, emergent intervention.  I provided critical care services, which included medication orders, frequent reevaluations of the patient's condition and response to treatment, ordering and reviewing test results, and discussing the case with various consultants.  The critical care time associated with the care of the patient was 48 minutes. Review chart for interventions. This time is exclusive of any other billable procedures.         DISPOSITION: Admit in critical condition      FINAL IMPRESSION  1. Acute respiratory failure with hypoxia and hypercarbia (HCC)    2. Cardiac arrest (HCC)           This dictation was created using voice recognition software. The accuracy of the dictation is limited to the abilities of the software. I expect there may be some errors of grammar and possibly content. The nursing notes were reviewed and certain aspects of this information were incorporated into this note.    Electronically signed by: Sami Decker, 10/28/2019 9:02 PM

## 2019-10-29 NOTE — DIETARY
"Nutrition Support Assessment:  Day 1 of admit.  Cheryl Bajwa is a 69 y.o. female with admitting DX of cardiac arrest.      Current problem list:  1. Respiratory arrest  2. Ventricular tachycardia  3. Acute on chronic respiratory failure with hypoxia and hypercapnia   4. Morbid obesity  5. Thrombocytopenia  6. Elevated troponin  7. Hypotension  8. Encephalopathy  9. Hypomagnesemia      Assessment:  Estimated Nutritional Needs based on:   Height: 170.2 cm (5' 7.01\")  Weight: (!) 125.9 kg (277 lb 9 oz) via bed scale 10/29  Ideal Body Weight: 61.2 kg (135 lb)  Percent Ideal Body Weight: 205.6  Body mass index is 43.46 kg/m²., BMI classification: Class III Obese     Calculation/Equation:  RMR per PSU (vent L/min 9.3, T max 24 hours 36.8) = 1925 kcal/day (65-70% = 1251 - 1348 kcal/day)   MSJ x 1.0 = 1814 kcal/day   Calories/day: 1380 - 1800 (11 - 14 kcal/kg ABW)   Protein/day: 141 - 151 g  (Grams Protein / k.3 - 2.5  IBW)     Evaluation:   1. Indication for nutrition support: Pt sedated and intubated on ventilator   2. Enteral access: Gastric Cortrak   3. Per respiratory therapist pt on ventilator x2 days   4. Meds: Levophed (stopped @ 0500 10/29), Propofol, magnesium sulfate (completed), potassium chloride (completed)  5. Propofol @ 5.1ml/hr providing 135 kcal/day - will monitor and adjust tube feeding as needed  6. Labs: Glucose 173    7. GI: LBM: PTA  8. Skin: No documented pressure wounds   9. Peptamen intense is an appropriate high protein formula appropriate to meet pt's estimated needs      Malnutrition Risk: Unable to assess at this time.      Recommendations/Plan:  1. Initiate Peptamen Intense @ 25 ml/hr and advance per protocol to goal rate of 65ml/hr, providing 1560 kcal, 144 g protein, and 1310 ml free water per day    2. Fluids per MD  3. Monitor weight  4. Advance to PO diet when appropriate    RD following.             "

## 2019-10-29 NOTE — ED NOTES
Assist RN:  Pt opening her eyes, responding to questions by shaking her head yes and no. Pt medicated with versed and fentanyl (see MAR).

## 2019-10-29 NOTE — THERAPY
PT orders received and acknowledged. Pt intubated, sedated and awaiting definitve POC. Will complete PT Evaluation once pt medically stable and appropriate to participate.

## 2019-10-29 NOTE — PROCEDURES
Date: 10/29/2019    Procedure: Bronchoscopy with Therapeutic suctioning and BAL    Indication: Infiltrate/atlectasis hypoxemia    Physician:  Angel Ignacio M.D.    Consent:  Patient and brother Vahe in [place] on phone    Procedure:  A time out occurred with the patient name, medical record number, allergies, and consent with right procedure and indication with bedside nurse and if able the patient. The patient was connected to a monitor and had continuous pulse oximeter. The patient was sedated with propofol infusion and fentanyl 100 mcg IV and lidocaine 4ml down ET. The bronchoscope was insert down the left and right bronchi to the terminal bronchioles. Therapeutic suction of secretion was provided through ET tube to romeo bloody thick then to MARIO main bronchus white clear, right lower lobe tan white thick. A BAL was preformed in the RLL and MARIO after injecting with small aliquots to a total of 30 ml in both areas. The patient tolerated the procedure without any immediate complications with minimal <5ml of blood loss.     Findings no endobronchial lesions thin tan to white secretions in MARIO and RLL no hyperemia to lung mucosa.     Angel Ignacio MD  Critical Care Medicine

## 2019-10-29 NOTE — FLOWSHEET NOTE
10/29/19 0936   Events/Summary/Plan   Events/Summary/Plan pt placed back on rest settings   General Vent Information   Pulse Oximetry 92 %   Walker Vent   Walker Vent Yes   Walker Vent Mode APVCMV   Walker Conventional Settings   Rate (breaths/min) 26   Vt Target (mL) 370   TI (Seconds) 0.8   PEEP/CPAP 8   Pramp 50   FiO2 60   Sensitivity Setting Flow Trigger   Other Settings 5   Weaning Parameters   RR (bpm) 28   #FVC / Vital Capacity (liters)  0.64   NIF (cm H2O)  -19   Rapid Shallow Breathing Index (RR/VT) 102   Spontaneous VE 7.5   Spontaneous    Weaning Trial   Weaning Trial Stopped due to: Pt weaned for 1 hour and returned to rest settings per protocol   Length of Weaning Trial (Hours) 1   Vent Weaning Smart Text completed? Yes

## 2019-10-29 NOTE — DISCHARGE PLANNING
Medical Social Work    Referral: Acute Medical Patient    Intervention: Pt is a 69 year old female brought in by [facility] from [facility].  Pt is a post arrest and arrives intubated.  SEMSA states that there was no family at previous hospital.  No facesheet was sent with pt.  Per previous Epic facesheet pt only has friends listed.  MSW contacted the ER at [facility] regarding family and RN states that 911 was called by pt's neighbor and they are unaware of family.  MSW contacted pt's emergency contact, Smitha ([phone]) and spoke with Smitha's  who states that Smitha should be on her way from the hospital as she was with pt.  Pt's  states that they are aware of pt's family and will attempt to get a hold of them.      MSW then received a call from pt's brother, Vahe ([phone]) who was advised of pt's hospitalization from pt's friends.  Vahe states that he lives in [place].  MSW transferred call to [facility] so he could update pt's brother via phone.    Plan: SW will continue to follow.

## 2019-10-29 NOTE — ASSESSMENT & PLAN NOTE
Alert and oriented  Likely from severe hypoxia and episode pea and vtach  No hypothermic or ct head as following commands, moving all extremities

## 2019-10-29 NOTE — THERAPY
Occupational Therapy Contact Note  Hold OT eval. Pt intubated, not following, and has no purposeful movement per chart; awaiting POC. Will re-attempt as appropriate/able.  Mary KAM, OTR/L    Pager #[phone]

## 2019-10-29 NOTE — ASSESSMENT & PLAN NOTE
Intubated 10/28/2019, extubated 10/30  Chronic respiratory acidosis  Echo normal ejection fraction 55% and no pulmonary hypertension  Likely related to severe COPD could be pulmonary fibrosis or obstructive sleep apnea/hypoventilation syndrome  To her closely and consider BiPAP if needed  RT protocol: And inhalers  Lasix 40 mg PO BID   Patient feels she is on her baseline
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
## 2019-10-29 NOTE — ED NOTES
Following administration of versed and fentanyl, pt becoming hypotensive, BP 80/51. Levo restarted at initial dose of 10mcg/min

## 2019-10-29 NOTE — ASSESSMENT & PLAN NOTE
Associated with cardiac arrest  Optimize electrolytes  Continue amiodarone po  Cardiac monitoring  Secondary to severe hypoxia   No planned intervention per cardiology  Diuresis  On amiodarone, may consider weaning due to underlying pulmonary issues, may not be the best medication long term  On bb  No need for aicd at this point per cards
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
## 2019-10-29 NOTE — ASSESSMENT & PLAN NOTE
Delirium due to acute respiratory failure   Improving   Alert and oriented x3  avoid benzodiazepines and anticholinergics  Frequent orientation  Avoid early morning labs  Avoid vital signs during sleep  Ambulate if possible
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
## 2019-10-30 ENCOUNTER — APPOINTMENT (OUTPATIENT)
Dept: RADIOLOGY | Facility: MEDICAL CENTER | Age: 69
DRG: 208 | End: 2019-10-30
Attending: INTERNAL MEDICINE
Payer: MEDICARE

## 2019-10-30 LAB
ACTION RANGE TRIGGERED IACRT: NO
ALBUMIN SERPL BCP-MCNC: 3.1 G/DL (ref 3.2–4.9)
ALBUMIN/GLOB SERPL: 1.2 G/DL
ALP SERPL-CCNC: 147 U/L (ref 30–99)
ALT SERPL-CCNC: 22 U/L (ref 2–50)
ANION GAP SERPL CALC-SCNC: 6 MMOL/L (ref 0–11.9)
ANION GAP SERPL CALC-SCNC: 7 MMOL/L (ref 0–11.9)
AST SERPL-CCNC: 17 U/L (ref 12–45)
BASE EXCESS BLDA CALC-SCNC: 5 MMOL/L (ref -4–3)
BASOPHILS # BLD AUTO: 0.2 % (ref 0–1.8)
BASOPHILS # BLD: 0.02 K/UL (ref 0–0.12)
BILIRUB SERPL-MCNC: 0.5 MG/DL (ref 0.1–1.5)
BODY TEMPERATURE: ABNORMAL DEGREES
BUN SERPL-MCNC: 29 MG/DL (ref 8–22)
BUN SERPL-MCNC: 29 MG/DL (ref 8–22)
CALCIUM SERPL-MCNC: 8.4 MG/DL (ref 8.5–10.5)
CALCIUM SERPL-MCNC: 9.1 MG/DL (ref 8.5–10.5)
CHLORIDE SERPL-SCNC: 101 MMOL/L (ref 96–112)
CHLORIDE SERPL-SCNC: 102 MMOL/L (ref 96–112)
CO2 BLDA-SCNC: 30 MMOL/L (ref 20–33)
CO2 SERPL-SCNC: 32 MMOL/L (ref 20–33)
CO2 SERPL-SCNC: 35 MMOL/L (ref 20–33)
CREAT SERPL-MCNC: 1 MG/DL (ref 0.5–1.4)
CREAT SERPL-MCNC: 1.04 MG/DL (ref 0.5–1.4)
CRP SERPL HS-MCNC: 2.36 MG/DL (ref 0–0.75)
EOSINOPHIL # BLD AUTO: 0 K/UL (ref 0–0.51)
EOSINOPHIL NFR BLD: 0 % (ref 0–6.9)
ERYTHROCYTE [DISTWIDTH] IN BLOOD BY AUTOMATED COUNT: 50.2 FL (ref 35.9–50)
GLOBULIN SER CALC-MCNC: 2.6 G/DL (ref 1.9–3.5)
GLUCOSE SERPL-MCNC: 111 MG/DL (ref 65–99)
GLUCOSE SERPL-MCNC: 116 MG/DL (ref 65–99)
HCO3 BLDA-SCNC: 28.6 MMOL/L (ref 17–25)
HCT VFR BLD AUTO: 41.1 % (ref 37–47)
HGB BLD-MCNC: 12.7 G/DL (ref 12–16)
HOROWITZ INDEX BLDA+IHG-RTO: 93 MM[HG]
IMM GRANULOCYTES # BLD AUTO: 0.04 K/UL (ref 0–0.11)
IMM GRANULOCYTES NFR BLD AUTO: 0.4 % (ref 0–0.9)
INST. QUALIFIED PATIENT IIQPT: YES
LYMPHOCYTES # BLD AUTO: 0.52 K/UL (ref 1–4.8)
LYMPHOCYTES NFR BLD: 5.5 % (ref 22–41)
MAGNESIUM SERPL-MCNC: 2.3 MG/DL (ref 1.5–2.5)
MCH RBC QN AUTO: 31.7 PG (ref 27–33)
MCHC RBC AUTO-ENTMCNC: 30.9 G/DL (ref 33.6–35)
MCV RBC AUTO: 102.5 FL (ref 81.4–97.8)
MONOCYTES # BLD AUTO: 0.55 K/UL (ref 0–0.85)
MONOCYTES NFR BLD AUTO: 5.8 % (ref 0–13.4)
NEUTROPHILS # BLD AUTO: 8.4 K/UL (ref 2–7.15)
NEUTROPHILS NFR BLD: 88.1 % (ref 44–72)
NRBC # BLD AUTO: 0 K/UL
NRBC BLD-RTO: 0 /100 WBC
O2/TOTAL GAS SETTING VFR VENT: 55 %
PCO2 BLDA: 38.9 MMHG (ref 26–37)
PCO2 TEMP ADJ BLDA: 39.2 MMHG (ref 26–37)
PH BLDA: 7.47 [PH] (ref 7.4–7.5)
PH TEMP ADJ BLDA: 7.47 [PH] (ref 7.4–7.5)
PHOSPHATE SERPL-MCNC: 1.1 MG/DL (ref 2.5–4.5)
PLATELET # BLD AUTO: 139 K/UL (ref 164–446)
PMV BLD AUTO: 11.2 FL (ref 9–12.9)
PO2 BLDA: 51 MMHG (ref 64–87)
PO2 TEMP ADJ BLDA: 52 MMHG (ref 64–87)
POTASSIUM SERPL-SCNC: 3.7 MMOL/L (ref 3.6–5.5)
POTASSIUM SERPL-SCNC: 3.8 MMOL/L (ref 3.6–5.5)
PREALB SERPL-MCNC: 15 MG/DL (ref 18–38)
PROT SERPL-MCNC: 5.7 G/DL (ref 6–8.2)
RBC # BLD AUTO: 4.01 M/UL (ref 4.2–5.4)
SAO2 % BLDA: 88 % (ref 93–99)
SODIUM SERPL-SCNC: 140 MMOL/L (ref 135–145)
SODIUM SERPL-SCNC: 143 MMOL/L (ref 135–145)
SPECIMEN DRAWN FROM PATIENT: ABNORMAL
TRIGL SERPL-MCNC: 164 MG/DL (ref 0–149)
WBC # BLD AUTO: 9.5 K/UL (ref 4.8–10.8)

## 2019-10-30 PROCEDURE — 85025 COMPLETE CBC W/AUTO DIFF WBC: CPT

## 2019-10-30 PROCEDURE — 83735 ASSAY OF MAGNESIUM: CPT

## 2019-10-30 PROCEDURE — A9270 NON-COVERED ITEM OR SERVICE: HCPCS | Performed by: HOSPITALIST

## 2019-10-30 PROCEDURE — 94640 AIRWAY INHALATION TREATMENT: CPT

## 2019-10-30 PROCEDURE — 84100 ASSAY OF PHOSPHORUS: CPT

## 2019-10-30 PROCEDURE — 99292 CRITICAL CARE ADDL 30 MIN: CPT | Performed by: INTERNAL MEDICINE

## 2019-10-30 PROCEDURE — 86140 C-REACTIVE PROTEIN: CPT

## 2019-10-30 PROCEDURE — 700102 HCHG RX REV CODE 250 W/ 637 OVERRIDE(OP): Performed by: INTERNAL MEDICINE

## 2019-10-30 PROCEDURE — 94150 VITAL CAPACITY TEST: CPT

## 2019-10-30 PROCEDURE — 84478 ASSAY OF TRIGLYCERIDES: CPT

## 2019-10-30 PROCEDURE — 80053 COMPREHEN METABOLIC PANEL: CPT

## 2019-10-30 PROCEDURE — 84134 ASSAY OF PREALBUMIN: CPT

## 2019-10-30 PROCEDURE — 700105 HCHG RX REV CODE 258: Performed by: INTERNAL MEDICINE

## 2019-10-30 PROCEDURE — 80048 BASIC METABOLIC PNL TOTAL CA: CPT

## 2019-10-30 PROCEDURE — 99291 CRITICAL CARE FIRST HOUR: CPT | Performed by: INTERNAL MEDICINE

## 2019-10-30 PROCEDURE — 71045 X-RAY EXAM CHEST 1 VIEW: CPT

## 2019-10-30 PROCEDURE — 770022 HCHG ROOM/CARE - ICU (200)

## 2019-10-30 PROCEDURE — 700111 HCHG RX REV CODE 636 W/ 250 OVERRIDE (IP): Performed by: INTERNAL MEDICINE

## 2019-10-30 PROCEDURE — 99233 SBSQ HOSP IP/OBS HIGH 50: CPT | Performed by: HOSPITALIST

## 2019-10-30 PROCEDURE — A9270 NON-COVERED ITEM OR SERVICE: HCPCS | Performed by: INTERNAL MEDICINE

## 2019-10-30 PROCEDURE — 99233 SBSQ HOSP IP/OBS HIGH 50: CPT | Performed by: INTERNAL MEDICINE

## 2019-10-30 PROCEDURE — 36600 WITHDRAWAL OF ARTERIAL BLOOD: CPT

## 2019-10-30 PROCEDURE — 82803 BLOOD GASES ANY COMBINATION: CPT

## 2019-10-30 PROCEDURE — 700102 HCHG RX REV CODE 250 W/ 637 OVERRIDE(OP): Performed by: HOSPITALIST

## 2019-10-30 PROCEDURE — 94003 VENT MGMT INPAT SUBQ DAY: CPT

## 2019-10-30 PROCEDURE — 700101 HCHG RX REV CODE 250: Performed by: INTERNAL MEDICINE

## 2019-10-30 RX ORDER — FUROSEMIDE 10 MG/ML
20 INJECTION INTRAMUSCULAR; INTRAVENOUS ONCE
Status: COMPLETED | OUTPATIENT
Start: 2019-10-30 | End: 2019-10-30

## 2019-10-30 RX ORDER — OXYCODONE HYDROCHLORIDE 5 MG/1
5 TABLET ORAL EVERY 4 HOURS PRN
Status: DISCONTINUED | OUTPATIENT
Start: 2019-10-30 | End: 2019-10-31

## 2019-10-30 RX ORDER — POTASSIUM CHLORIDE 20 MEQ/1
20 TABLET, EXTENDED RELEASE ORAL ONCE
Status: COMPLETED | OUTPATIENT
Start: 2019-10-30 | End: 2019-10-30

## 2019-10-30 RX ADMIN — FAMOTIDINE 20 MG: 20 TABLET ORAL at 04:54

## 2019-10-30 RX ADMIN — IPRATROPIUM BROMIDE AND ALBUTEROL SULFATE 3 ML: .5; 3 SOLUTION RESPIRATORY (INHALATION) at 06:35

## 2019-10-30 RX ADMIN — FUROSEMIDE 20 MG: 10 INJECTION, SOLUTION INTRAMUSCULAR; INTRAVENOUS at 13:01

## 2019-10-30 RX ADMIN — POTASSIUM CHLORIDE 20 MEQ: 20 TABLET, EXTENDED RELEASE ORAL at 20:41

## 2019-10-30 RX ADMIN — ENOXAPARIN SODIUM 40 MG: 100 INJECTION SUBCUTANEOUS at 17:48

## 2019-10-30 RX ADMIN — IPRATROPIUM BROMIDE AND ALBUTEROL SULFATE 3 ML: .5; 3 SOLUTION RESPIRATORY (INHALATION) at 22:50

## 2019-10-30 RX ADMIN — SODIUM PHOSPHATE, MONOBASIC, MONOHYDRATE AND SODIUM PHOSPHATE, DIBASIC, ANHYDROUS 15 MMOL: 276; 142 INJECTION, SOLUTION INTRAVENOUS at 13:30

## 2019-10-30 RX ADMIN — IPRATROPIUM BROMIDE AND ALBUTEROL SULFATE 3 ML: .5; 3 SOLUTION RESPIRATORY (INHALATION) at 14:49

## 2019-10-30 RX ADMIN — POTASSIUM BICARBONATE 25 MEQ: 978 TABLET, EFFERVESCENT ORAL at 09:10

## 2019-10-30 RX ADMIN — SENNOSIDES AND DOCUSATE SODIUM 2 TABLET: 8.6; 5 TABLET ORAL at 04:54

## 2019-10-30 RX ADMIN — OXYCODONE HYDROCHLORIDE 5 MG: 5 TABLET ORAL at 13:00

## 2019-10-30 RX ADMIN — ENOXAPARIN SODIUM 40 MG: 100 INJECTION SUBCUTANEOUS at 04:54

## 2019-10-30 RX ADMIN — FENTANYL CITRATE 100 MCG: 50 INJECTION, SOLUTION INTRAMUSCULAR; INTRAVENOUS at 05:06

## 2019-10-30 RX ADMIN — IPRATROPIUM BROMIDE AND ALBUTEROL SULFATE 3 ML: .5; 3 SOLUTION RESPIRATORY (INHALATION) at 09:44

## 2019-10-30 RX ADMIN — LEVOTHYROXINE SODIUM 175 MCG: 75 TABLET ORAL at 04:54

## 2019-10-30 RX ADMIN — IPRATROPIUM BROMIDE AND ALBUTEROL SULFATE 3 ML: .5; 3 SOLUTION RESPIRATORY (INHALATION) at 02:05

## 2019-10-30 RX ADMIN — FENTANYL CITRATE 100 MCG: 50 INJECTION, SOLUTION INTRAMUSCULAR; INTRAVENOUS at 01:12

## 2019-10-30 RX ADMIN — IPRATROPIUM BROMIDE AND ALBUTEROL SULFATE 3 ML: .5; 3 SOLUTION RESPIRATORY (INHALATION) at 19:31

## 2019-10-30 RX ADMIN — SODIUM PHOSPHATE, MONOBASIC, MONOHYDRATE AND SODIUM PHOSPHATE, DIBASIC, ANHYDROUS 30 MMOL: 276; 142 INJECTION, SOLUTION INTRAVENOUS at 09:10

## 2019-10-30 RX ADMIN — PREDNISONE 40 MG: 20 TABLET ORAL at 04:54

## 2019-10-30 ASSESSMENT — COPD QUESTIONNAIRES
DO YOU EVER COUGH UP ANY MUCUS OR PHLEGM?: YES, A FEW DAYS A WEEK OR MONTH
DURING THE PAST 4 WEEKS HOW MUCH DID YOU FEEL SHORT OF BREATH: MOST  OR ALL OF THE TIME
COPD SCREENING SCORE: 8
HAVE YOU SMOKED AT LEAST 100 CIGARETTES IN YOUR ENTIRE LIFE: YES

## 2019-10-30 ASSESSMENT — COGNITIVE AND FUNCTIONAL STATUS - GENERAL
HELP NEEDED FOR BATHING: TOTAL
TURNING FROM BACK TO SIDE WHILE IN FLAT BAD: A LOT
PERSONAL GROOMING: TOTAL
DAILY ACTIVITIY SCORE: 6
SUGGESTED CMS G CODE MODIFIER DAILY ACTIVITY: CN
DRESSING REGULAR LOWER BODY CLOTHING: TOTAL
MOVING TO AND FROM BED TO CHAIR: A LOT
MOBILITY SCORE: 9
CLIMB 3 TO 5 STEPS WITH RAILING: TOTAL
WALKING IN HOSPITAL ROOM: TOTAL
STANDING UP FROM CHAIR USING ARMS: A LOT
EATING MEALS: TOTAL
DRESSING REGULAR UPPER BODY CLOTHING: TOTAL
MOVING FROM LYING ON BACK TO SITTING ON SIDE OF FLAT BED: UNABLE
TOILETING: TOTAL
SUGGESTED CMS G CODE MODIFIER MOBILITY: CM

## 2019-10-30 ASSESSMENT — LIFESTYLE VARIABLES: EVER_SMOKED: YES

## 2019-10-30 ASSESSMENT — PULMONARY FUNCTION TESTS: FVC: .77

## 2019-10-30 NOTE — CARE PLAN
Problem: Venous Thromboembolism (VTW)/Deep Vein Thrombosis (DVT) Prevention:  Goal: Patient will participate in Venous Thrombosis (VTE)/Deep Vein Thrombosis (DVT)Prevention Measures  Outcome: PROGRESSING AS EXPECTED  Flowsheets (Taken 10/30/2019 0027)  SCDs, Sequential Compression Device: On  Pharmacologic Prophylaxis Used: LMWH: Enoxaparin(Lovenox)  Note:   SCDs on. Patient minimally moves lower extremities without prompting. Lovenox ordered.     Problem: Safety - Medical Restraint  Goal: Remains free of injury from restraints (Restraint for Interference with Medical Device)  Description  INTERVENTIONS:  1. Determine that other, less restrictive measures have been tried or would not be effective before applying the restraint  2. Evaluate the patient's condition at the time of restraint application  3. Inform patient/family regarding the reason for restraint  4. Q2H: Monitor safety, psychosocial status, comfort, nutrition and hydration  Outcome: PROGRESSING AS EXPECTED  Flowsheets (Taken 10/30/2019 0027)  Addressed this shift: Remains free of injury from restraints (restraint for interference with medical device): Every 2 hours: Monitor safety, psychosocial status, comfort, nutrition and hydration; Inform patient/family regarding the reason for restraint     Problem: Skin Integrity  Goal: Risk for impaired skin integrity will decrease  Outcome: PROGRESSING SLOWER THAN EXPECTED  Note:   Waffle cushion in place. Heel float boots. Q2 turns. Barrier paste applied. Patient has redness under breasts/pannus. Sacrum is red with moisture associated dermatitis.

## 2019-10-30 NOTE — PROGRESS NOTES
Monitor Summary     SR: .20/.06/.40    12 hour chart check     2 RN Skin assessment. Redness under breasts/pannus. Redness and dermatitis to sacrum/buttock, otherwise skin intact.  Skin assessed under devices.

## 2019-10-30 NOTE — CARE PLAN
Problem: Communication  Goal: The ability to communicate needs accurately and effectively will improve  Outcome: PROGRESSING AS EXPECTED  Note:   Pt communicating by writing on paper while intubated. Able to verbalize needs following extubation.  Intervention: [place] patient and significant other/support system to call light to alert staff of needs  Flowsheets (Taken 10/30/2019 1432)  Oriented to:: Location of bathroom; Visiting Policy; Call Light & Bedside Controls; Bedside Rail Policy; Bedside Report  Intervention: Reorient patient to environment as needed  Flowsheets (Taken 10/30/2019 1432)  Oriented to:: Location of bathroom; Visiting Policy; Call Light & Bedside Controls; Bedside Rail Policy; Bedside Report  Intervention: Educate patient and significant other/support system about the plan of care, procedures, treatments, medications and allow for questions  Flowsheets (Taken 10/30/2019 1432)  Pt & Family Have Been Educated on Methods Available to Report Concerns Related to Care, Treatment, Services, and Patient Safety Issues: Yes  Intervention: Use communication aids and/or /Language Line as appropriate  Flowsheets (Taken 10/30/2019 1432)  Patient's Primary Language: English  Hearing Impairment: Patient Declines to Wear Hearing Device(s)  Does Pt Need Special Equipment for the Hearing Impaired?: No  Intervention: Develop alternate methods of communication with patient and significant other/support system  Note:   Written communication while intubated  Intervention: Collaborate with speech therapy  Note:   Not at this time     Problem: Pain Management  Goal: Pain level will decrease to patient's comfort goal  Outcome: PROGRESSING AS EXPECTED  Intervention: Follow pain managment plan developed in collaboration with patient and Interdisciplinary Team  Note:   PRN Oxycodone added to pt MAR  Intervention: Educate and implement non-pharmacologic comfort measures. Examples: relaxation, distration, play  therapy, activity therapy, massage, etc.  Flowsheets (Taken 10/30/2019 1400)  Intervention: [place];Environmental Changes;Relaxation Technique;Repositioned;Rest  Note:   Pharmacologic and non-pharmacologic methods used to decrease pain level effectively

## 2019-10-30 NOTE — PROGRESS NOTES
Reason for consultation /admission : s/p PEA arrest, VT    Intubated but awake  Intensivist plans to extubate later today    Monitor reviewed no VT, there are some concern about PAF but having sig baseline artifacts on sig amount of time  Rate during questionable AF was only in the 110    ECHO NL LVSF, no valve issue      Review of systems; unable to perform due to intubation        Temp:  [36.7 °C (98.1 °F)-37.7 °C (99.9 °F)] 37.1 °C (98.8 °F)  Pulse:  [] 69  Resp:  [1-30] 13  BP: ()/(53-96) 118/73  SpO2:  [79 %-96 %] 95 %  GENERAL not in acute distress, not dyspnic at rest  Head atraumatic, normocephalic  Eyes EOMI  ENT neck supple, no JVD, no carotid bruits or thyromegaly  Lung good expansion, distant sound, no rales or wheezing  Heart RRR, normal rate, no murmur,   no gallop or rub  Abd soft, no tenderness, mass or bruits  Ext no edema  Skin no ecchymosis or petechiae  Musculoskeletal no deformity  Neuro grossly intact  Psych unable to assess    Monitor reviewed by me showed no significant ventricular arrhythmias, prob some MAT but with sig baseline artifacts, no definite AF so far.    Labs: Cr 3.8, K+ 1    Assessment and plans    1. S/p OOH arrest initial rhythm was PEA with reported ventricular tachycardia  No recurrent VT since admission on no antiarrhythmic  Troponin and EKG did not suggest acute coronary event  More likely respiratory arrest by history  Echocardiography NL EF  Plan MPI when more stable     2.  Respiratory failure/COPD  Per intensivist     3.  Borderline elevated troponin likely secondary to acute stress/prolonged hypoxemia  As above MPI when feasible     4. Atrial tachyarrhythmias, prob mostly from frequent PACs/MAT due to #2   Rate mostly in normal range, metoprolol IV for high rate as BP allows  Try obtaining better monitor recording info, anticoagulation if in AF confirmed    Will follow    Please note that this dictation was created using voice recognition software. I have  worked with consultants from the vendor as well as technical experts from [facility] to optimize the interface. I have made every reasonable attempt to correct obvious errors, but I expect that there are errors of grammar and possibly content I did not discover before finalizing the note

## 2019-10-30 NOTE — FLOWSHEET NOTE
10/30/19 0939   Weaning Parameters   RR (bpm) 27   #FVC / Vital Capacity (liters)  0.77   NIF (cm H2O)  -20   Rapid Shallow Breathing Index (RR/VT) 39   Spontaneous VE 11.5   Spontaneous    Weaning Trial   Weaning Trial Initiated Yes   Weaning Trial Stopped due to: Pt weaned for 1 hour and returned to rest settings per protocol   Length of Weaning Trial (Hours) 1   Vent Weaning Smart Text completed? Yes

## 2019-10-30 NOTE — THERAPY
OT eval attempted.  Pt extubated earlier today.  Pt had refused PT earlier & also declined OT & Nsg attempts due to fatigue.  Will attempt in am.
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
## 2019-10-30 NOTE — FLOWSHEET NOTE
10/30/19 0831   Events/Summary/Plan   Events/Summary/Plan Spont 5/8   Weaning Trial   Weaning Trial Initiated Yes

## 2019-10-30 NOTE — DISCHARGE PLANNING
Care Transition Team Discharge Planning    Anticipated Discharge Disposition: TBD    Action: Lsw noted pt is vented and not able to speak.    Lsw called 1st emergency contact, her brother. His wife answered and they live in [place]. They speak w/ pt over the phone. They recommend Lsw call pt's close friend, and 2nd emergency contact Smitha. She lives near pt, and can more accurately provide d/c planning assessment.    Lsw called Smitha, and left message w/ a male. He will have Smitha call this Lsw once she is available.        Barriers to Discharge: TBD    Plan: f/u w/ emergency contact 2 and medical team

## 2019-10-30 NOTE — THERAPY
PT orders received and acknowledged. Attempted PT eval this pm. Pt refused PT eval at this time due to fatigue. Pt just extubated earlier today and requested to rest. Will complete PT eval as able.

## 2019-10-31 ENCOUNTER — APPOINTMENT (OUTPATIENT)
Dept: RADIOLOGY | Facility: MEDICAL CENTER | Age: 69
DRG: 208 | End: 2019-10-31
Attending: INTERNAL MEDICINE
Payer: MEDICARE

## 2019-10-31 PROBLEM — I48.92 ATRIAL FLUTTER (HCC): Status: ACTIVE | Noted: 2019-10-31

## 2019-10-31 LAB
ANION GAP SERPL CALC-SCNC: 7 MMOL/L (ref 0–11.9)
BACTERIA BRONCH AEROBE CULT: NORMAL
BACTERIA SPEC RESP CULT: NORMAL
BUN SERPL-MCNC: 25 MG/DL (ref 8–22)
CALCIUM SERPL-MCNC: 8.2 MG/DL (ref 8.5–10.5)
CHLORIDE SERPL-SCNC: 101 MMOL/L (ref 96–112)
CO2 SERPL-SCNC: 35 MMOL/L (ref 20–33)
CREAT SERPL-MCNC: 0.85 MG/DL (ref 0.5–1.4)
EKG IMPRESSION: NORMAL
ERYTHROCYTE [DISTWIDTH] IN BLOOD BY AUTOMATED COUNT: 51.4 FL (ref 35.9–50)
GLUCOSE SERPL-MCNC: 91 MG/DL (ref 65–99)
GRAM STN SPEC: NORMAL
GRAM STN SPEC: NORMAL
HCT VFR BLD AUTO: 41.8 % (ref 37–47)
HGB BLD-MCNC: 12.6 G/DL (ref 12–16)
MAGNESIUM SERPL-MCNC: 2.1 MG/DL (ref 1.5–2.5)
MCH RBC QN AUTO: 31 PG (ref 27–33)
MCHC RBC AUTO-ENTMCNC: 30.1 G/DL (ref 33.6–35)
MCV RBC AUTO: 103 FL (ref 81.4–97.8)
NT-PROBNP SERPL IA-MCNC: 480 PG/ML (ref 0–125)
PHOSPHATE SERPL-MCNC: 2 MG/DL (ref 2.5–4.5)
PLATELET # BLD AUTO: 114 K/UL (ref 164–446)
PMV BLD AUTO: 10.4 FL (ref 9–12.9)
POTASSIUM SERPL-SCNC: 3.2 MMOL/L (ref 3.6–5.5)
RBC # BLD AUTO: 4.06 M/UL (ref 4.2–5.4)
SIGNIFICANT IND 70042: NORMAL
SIGNIFICANT IND 70042: NORMAL
SITE SITE: NORMAL
SITE SITE: NORMAL
SODIUM SERPL-SCNC: 143 MMOL/L (ref 135–145)
SOURCE SOURCE: NORMAL
SOURCE SOURCE: NORMAL
WBC # BLD AUTO: 6.5 K/UL (ref 4.8–10.8)

## 2019-10-31 PROCEDURE — 93010 ELECTROCARDIOGRAM REPORT: CPT | Performed by: INTERNAL MEDICINE

## 2019-10-31 PROCEDURE — 99292 CRITICAL CARE ADDL 30 MIN: CPT | Performed by: INTERNAL MEDICINE

## 2019-10-31 PROCEDURE — 700105 HCHG RX REV CODE 258: Performed by: INTERNAL MEDICINE

## 2019-10-31 PROCEDURE — 700102 HCHG RX REV CODE 250 W/ 637 OVERRIDE(OP): Performed by: INTERNAL MEDICINE

## 2019-10-31 PROCEDURE — A9270 NON-COVERED ITEM OR SERVICE: HCPCS | Performed by: INTERNAL MEDICINE

## 2019-10-31 PROCEDURE — 85027 COMPLETE CBC AUTOMATED: CPT

## 2019-10-31 PROCEDURE — 93005 ELECTROCARDIOGRAM TRACING: CPT | Performed by: INTERNAL MEDICINE

## 2019-10-31 PROCEDURE — 83880 ASSAY OF NATRIURETIC PEPTIDE: CPT

## 2019-10-31 PROCEDURE — 99233 SBSQ HOSP IP/OBS HIGH 50: CPT | Performed by: INTERNAL MEDICINE

## 2019-10-31 PROCEDURE — 700111 HCHG RX REV CODE 636 W/ 250 OVERRIDE (IP): Performed by: INTERNAL MEDICINE

## 2019-10-31 PROCEDURE — 84100 ASSAY OF PHOSPHORUS: CPT

## 2019-10-31 PROCEDURE — 99291 CRITICAL CARE FIRST HOUR: CPT | Performed by: INTERNAL MEDICINE

## 2019-10-31 PROCEDURE — 97535 SELF CARE MNGMENT TRAINING: CPT

## 2019-10-31 PROCEDURE — 700101 HCHG RX REV CODE 250: Performed by: INTERNAL MEDICINE

## 2019-10-31 PROCEDURE — A9270 NON-COVERED ITEM OR SERVICE: HCPCS | Performed by: HOSPITALIST

## 2019-10-31 PROCEDURE — 97166 OT EVAL MOD COMPLEX 45 MIN: CPT

## 2019-10-31 PROCEDURE — 99233 SBSQ HOSP IP/OBS HIGH 50: CPT | Performed by: HOSPITALIST

## 2019-10-31 PROCEDURE — 83735 ASSAY OF MAGNESIUM: CPT

## 2019-10-31 PROCEDURE — 80048 BASIC METABOLIC PNL TOTAL CA: CPT

## 2019-10-31 PROCEDURE — 71045 X-RAY EXAM CHEST 1 VIEW: CPT

## 2019-10-31 PROCEDURE — 770022 HCHG ROOM/CARE - ICU (200)

## 2019-10-31 PROCEDURE — 94640 AIRWAY INHALATION TREATMENT: CPT

## 2019-10-31 PROCEDURE — 97162 PT EVAL MOD COMPLEX 30 MIN: CPT

## 2019-10-31 PROCEDURE — 700102 HCHG RX REV CODE 250 W/ 637 OVERRIDE(OP): Performed by: HOSPITALIST

## 2019-10-31 RX ORDER — IPRATROPIUM BROMIDE AND ALBUTEROL SULFATE 2.5; .5 MG/3ML; MG/3ML
3 SOLUTION RESPIRATORY (INHALATION)
Status: DISCONTINUED | OUTPATIENT
Start: 2019-11-01 | End: 2019-11-15

## 2019-10-31 RX ORDER — POLYETHYLENE GLYCOL 3350 17 G/17G
1 POWDER, FOR SOLUTION ORAL
Status: DISCONTINUED | OUTPATIENT
Start: 2019-10-31 | End: 2019-11-09

## 2019-10-31 RX ORDER — ACETAMINOPHEN 325 MG/1
650 TABLET ORAL EVERY 4 HOURS PRN
Status: DISCONTINUED | OUTPATIENT
Start: 2019-10-31 | End: 2019-11-21 | Stop reason: HOSPADM

## 2019-10-31 RX ORDER — PREDNISONE 20 MG/1
40 TABLET ORAL DAILY
Status: COMPLETED | OUTPATIENT
Start: 2019-11-01 | End: 2019-11-02

## 2019-10-31 RX ORDER — NYSTATIN 100000 [USP'U]/G
POWDER TOPICAL 2 TIMES DAILY
Status: DISCONTINUED | OUTPATIENT
Start: 2019-10-31 | End: 2019-11-07

## 2019-10-31 RX ORDER — SODIUM CHLORIDE 9 MG/ML
INJECTION, SOLUTION INTRAVENOUS
Status: ACTIVE
Start: 2019-10-31 | End: 2019-10-31

## 2019-10-31 RX ORDER — FUROSEMIDE 10 MG/ML
20 INJECTION INTRAMUSCULAR; INTRAVENOUS ONCE
Status: COMPLETED | OUTPATIENT
Start: 2019-10-31 | End: 2019-10-31

## 2019-10-31 RX ORDER — POTASSIUM CHLORIDE 20 MEQ/1
40 TABLET, EXTENDED RELEASE ORAL 2 TIMES DAILY
Status: DISCONTINUED | OUTPATIENT
Start: 2019-10-31 | End: 2019-11-01

## 2019-10-31 RX ORDER — ONDANSETRON 4 MG/1
4 TABLET, ORALLY DISINTEGRATING ORAL EVERY 4 HOURS PRN
Status: DISCONTINUED | OUTPATIENT
Start: 2019-10-31 | End: 2019-11-21 | Stop reason: HOSPADM

## 2019-10-31 RX ORDER — AMOXICILLIN 250 MG
2 CAPSULE ORAL 2 TIMES DAILY
Status: DISCONTINUED | OUTPATIENT
Start: 2019-10-31 | End: 2019-11-09

## 2019-10-31 RX ORDER — MAGNESIUM SULFATE HEPTAHYDRATE 40 MG/ML
2 INJECTION, SOLUTION INTRAVENOUS ONCE
Status: COMPLETED | OUTPATIENT
Start: 2019-10-31 | End: 2019-10-31

## 2019-10-31 RX ORDER — BISACODYL 10 MG
10 SUPPOSITORY, RECTAL RECTAL
Status: DISCONTINUED | OUTPATIENT
Start: 2019-10-31 | End: 2019-11-09

## 2019-10-31 RX ORDER — FUROSEMIDE 10 MG/ML
40 INJECTION INTRAMUSCULAR; INTRAVENOUS
Status: DISCONTINUED | OUTPATIENT
Start: 2019-10-31 | End: 2019-11-01

## 2019-10-31 RX ORDER — AMIODARONE HYDROCHLORIDE 200 MG/1
400 TABLET ORAL TWICE DAILY
Status: DISCONTINUED | OUTPATIENT
Start: 2019-10-31 | End: 2019-11-04

## 2019-10-31 RX ADMIN — POTASSIUM PHOSPHATE, MONOBASIC AND POTASSIUM PHOSPHATE, DIBASIC 15 MMOL: 224; 236 INJECTION, SOLUTION, CONCENTRATE INTRAVENOUS at 10:13

## 2019-10-31 RX ADMIN — IPRATROPIUM BROMIDE AND ALBUTEROL SULFATE 3 ML: .5; 3 SOLUTION RESPIRATORY (INHALATION) at 03:01

## 2019-10-31 RX ADMIN — AMIODARONE HYDROCHLORIDE 400 MG: 200 TABLET ORAL at 10:13

## 2019-10-31 RX ADMIN — NYSTATIN: 100000 POWDER TOPICAL at 19:43

## 2019-10-31 RX ADMIN — METOPROLOL TARTRATE 25 MG: 25 TABLET, FILM COATED ORAL at 04:36

## 2019-10-31 RX ADMIN — SENNOSIDES AND DOCUSATE SODIUM 2 TABLET: 8.6; 5 TABLET ORAL at 04:37

## 2019-10-31 RX ADMIN — SENNOSIDES AND DOCUSATE SODIUM 2 TABLET: 8.6; 5 TABLET ORAL at 17:43

## 2019-10-31 RX ADMIN — ACETAMINOPHEN 650 MG: 325 TABLET, FILM COATED ORAL at 10:55

## 2019-10-31 RX ADMIN — FUROSEMIDE 40 MG: 10 INJECTION, SOLUTION INTRAMUSCULAR; INTRAVENOUS at 11:45

## 2019-10-31 RX ADMIN — ENOXAPARIN SODIUM 40 MG: 100 INJECTION SUBCUTANEOUS at 04:37

## 2019-10-31 RX ADMIN — PREDNISONE 40 MG: 20 TABLET ORAL at 04:37

## 2019-10-31 RX ADMIN — POTASSIUM CHLORIDE 40 MEQ: 20 TABLET, EXTENDED RELEASE ORAL at 17:44

## 2019-10-31 RX ADMIN — AMIODARONE HYDROCHLORIDE 400 MG: 200 TABLET ORAL at 17:44

## 2019-10-31 RX ADMIN — IPRATROPIUM BROMIDE AND ALBUTEROL SULFATE 3 ML: .5; 3 SOLUTION RESPIRATORY (INHALATION) at 11:08

## 2019-10-31 RX ADMIN — FUROSEMIDE 20 MG: 10 INJECTION, SOLUTION INTRAMUSCULAR; INTRAVENOUS at 18:30

## 2019-10-31 RX ADMIN — POTASSIUM CHLORIDE 40 MEQ: 20 TABLET, EXTENDED RELEASE ORAL at 04:36

## 2019-10-31 RX ADMIN — ENOXAPARIN SODIUM 120 MG: 150 INJECTION SUBCUTANEOUS at 18:30

## 2019-10-31 RX ADMIN — IPRATROPIUM BROMIDE AND ALBUTEROL SULFATE 3 ML: .5; 3 SOLUTION RESPIRATORY (INHALATION) at 07:23

## 2019-10-31 RX ADMIN — MAGNESIUM SULFATE IN WATER 2 G: 40 INJECTION, SOLUTION INTRAVENOUS at 04:38

## 2019-10-31 RX ADMIN — LEVOTHYROXINE SODIUM 175 MCG: 75 TABLET ORAL at 04:36

## 2019-10-31 RX ADMIN — NYSTATIN: 100000 POWDER TOPICAL at 12:00

## 2019-10-31 RX ADMIN — METOPROLOL TARTRATE 25 MG: 25 TABLET, FILM COATED ORAL at 17:44

## 2019-10-31 RX ADMIN — IPRATROPIUM BROMIDE AND ALBUTEROL SULFATE 3 ML: .5; 3 SOLUTION RESPIRATORY (INHALATION) at 19:57

## 2019-10-31 RX ADMIN — FUROSEMIDE 20 MG: 10 INJECTION, SOLUTION INTRAMUSCULAR; INTRAVENOUS at 04:37

## 2019-10-31 RX ADMIN — IPRATROPIUM BROMIDE AND ALBUTEROL SULFATE 3 ML: .5; 3 SOLUTION RESPIRATORY (INHALATION) at 14:54

## 2019-10-31 ASSESSMENT — COGNITIVE AND FUNCTIONAL STATUS - GENERAL
STANDING UP FROM CHAIR USING ARMS: A LOT
DRESSING REGULAR LOWER BODY CLOTHING: TOTAL
TOILETING: TOTAL
MOBILITY SCORE: 10
MOVING TO AND FROM BED TO CHAIR: A LOT
PERSONAL GROOMING: A LITTLE
DRESSING REGULAR UPPER BODY CLOTHING: A LOT
MOVING FROM LYING ON BACK TO SITTING ON SIDE OF FLAT BED: A LOT
EATING MEALS: TOTAL
TURNING FROM BACK TO SIDE WHILE IN FLAT BAD: A LOT
DAILY ACTIVITIY SCORE: 9
SUGGESTED CMS G CODE MODIFIER DAILY ACTIVITY: CL
CLIMB 3 TO 5 STEPS WITH RAILING: TOTAL
SUGGESTED CMS G CODE MODIFIER MOBILITY: CL
WALKING IN HOSPITAL ROOM: TOTAL
HELP NEEDED FOR BATHING: TOTAL

## 2019-10-31 ASSESSMENT — ENCOUNTER SYMPTOMS
MYALGIAS: 1
NEUROLOGICAL NEGATIVE: 1
POLYDIPSIA: 0
FEVER: 0
CHILLS: 0
GASTROINTESTINAL NEGATIVE: 1
VOMITING: 0
COUGH: 1
PALPITATIONS: 0
FOCAL WEAKNESS: 0
NAUSEA: 0
BRUISES/BLEEDS EASILY: 0
NECK PAIN: 0
SHORTNESS OF BREATH: 1
WEAKNESS: 0
DIZZINESS: 0
BACK PAIN: 1
HEADACHES: 0
HEARTBURN: 0
NERVOUS/ANXIOUS: 1
SPUTUM PRODUCTION: 1
EYES NEGATIVE: 1
DEPRESSION: 0

## 2019-10-31 ASSESSMENT — ACTIVITIES OF DAILY LIVING (ADL): TOILETING: INDEPENDENT

## 2019-10-31 ASSESSMENT — GAIT ASSESSMENTS: GAIT LEVEL OF ASSIST: UNABLE TO PARTICIPATE

## 2019-10-31 NOTE — PROGRESS NOTES
"Critical Care Progress Note    Date of admission  10/28/2019    Chief Complaint  69 y.o. female with a past medical history of chronic obstructive pulmonary disease on 2 L home oxygen, hypothyroidism who presented 10/28/2019 as a transfer from [facility] where she presented this morning when her power went out and her oxygen compressor did not work.  She was sent home with an oxygen tank however was found 4 hours later unconscious outside her house by her neighbor.  The patient was found to be hypoxic and taken to the ER where she was intubated after a ABG showed a pH of 7.0 due to an elevated PCO2.  She suffered a PEA arrest after intubation however reportedly not \"zara-intubation\".  Patient was then scheduled for transfer to [facility] however in route to [facility] developed V. tach arrest and was shocked.  She became hypotensive and Levophed was started.  In our ER a central line was placed, amiodarone was started and Levophed was initiated.  Repeat labs show a WBC of 12.3, hemoglobin 14.3, platelet count 120, metabolic panel with a sodium of 142, potassium 4.1, CO2 34, glucose 172, BUN 19, creatinine 0.81, phosphate 0.2, magnesium 1.7, lactic acid 1.1, troponin 25 -> 40.  A CTA of the chest was completed to rule out pulmonary embolus; no pulmonary embolus was identified however a small right-sided pneumo as well as an effusion and volume loss of the right lung was noted.  A stat CT head is pending.  Cardiology has seen patient in the ER, no plans for ischemic evaluation at this time.     Hospital Course          Interval Problem Update  Reviewed last 24 hour events:  15 L oxygen  Temp 99F  Sinus 70-80s  Tylenol prn  Stop oxy  High flow prn  Up to chair/mobilize  1.8 L out since extubation  Started lasix  Chest xray reduce right lung space, ? Diaphragm/pna/effusion  No stress ulcer px  lovenox dvt px  No ab  K 3.2, continue sched    Addendum  Up to chair  Remained on high o2 requirements  Change to high flow if " necessary    Review of Systems  Review of Systems   Constitutional: Positive for malaise/fatigue. Negative for chills, diaphoresis, fever and weight loss.   HENT: Negative for congestion and sinus pain.    Eyes: Negative for blurred vision, double vision and photophobia.   Respiratory: Positive for shortness of breath. Negative for cough, hemoptysis, sputum production, wheezing and stridor.    Cardiovascular: Positive for leg swelling. Negative for chest pain and palpitations.   Gastrointestinal: Negative for blood in stool, heartburn, melena and vomiting.   Genitourinary: Negative for dysuria and urgency.   Musculoskeletal: Negative for back pain, myalgias and neck pain.   Skin: Negative for itching and rash.   Neurological: Negative for dizziness, tingling, sensory change, speech change, focal weakness and headaches.   Endo/Heme/Allergies: Negative for polydipsia. Does not bruise/bleed easily.   Psychiatric/Behavioral: Negative for depression. The patient is not nervous/anxious.         Vital Signs for last 24 hours   Pulse:  [] 84  Resp:  [13-31] 23  BP: ()/(62-93) 95/62  SpO2:  [87 %-95 %] 91 %    Hemodynamic parameters for last 24 hours       Respiratory Information for the last 24 hours  Walker Vent Mode: Spont  PEEP/CPAP: 8  FiO2: 50  P Support: 5  P MEAN: 10  Length of Weaning Trial (Hours): 1    Physical Exam   Physical Exam   Constitutional: She is oriented to person, place, and time. She appears well-developed and well-nourished. She appears distressed.   Ill appearing, lethargic   HENT:   Head: Normocephalic and atraumatic.   Right Ear: External ear normal.   Left Ear: External ear normal.   Mouth/Throat: No oropharyngeal exudate.   Eyes: Pupils are equal, round, and reactive to light. Conjunctivae and EOM are normal. Right eye exhibits no discharge. Left eye exhibits no discharge.   Neck: No JVD present. No tracheal deviation present.   Cardiovascular: Normal rate, regular rhythm and  normal heart sounds.   No murmur heard.  Pulmonary/Chest: No stridor. She is in respiratory distress. She has no wheezes. She has rales. She exhibits no tenderness.   Abdominal: She exhibits no mass. There is no tenderness. There is no rebound and no guarding.   Musculoskeletal: She exhibits edema. She exhibits no tenderness or deformity.   Neurological: She is alert and oriented to person, place, and time. She displays normal reflexes. No cranial nerve deficit. Coordination normal.   Skin: Skin is warm and dry. No rash noted. She is not diaphoretic. No erythema.   Psychiatric: She has a normal mood and affect. Her behavior is normal.   lethargic   Nursing note and vitals reviewed.      Medications  Current Facility-Administered Medications   Medication Dose Route Frequency Provider Last Rate Last Dose   • metoprolol (LOPRESSOR) tablet 25 mg  25 mg Oral TWICE DAILY Angel Ignacio M.D.   25 mg at 10/31/19 0436   • potassium chloride SA (Kdur) tablet 40 mEq  40 mEq Oral BID Angel Ignacio M.D.   40 mEq at 10/31/19 0436   • SODIUM CHLORIDE 0.9 % IV SOLN            • oxyCODONE immediate-release (ROXICODONE) tablet 5 mg  5 mg Enteral Tube Q4HRS PRN Yunier Miranda M.D.   5 mg at 10/30/19 1300   • DEXTROSE 10% BOLUS 250 mL  250 mL Intravenous Q15 MIN PRN Angel Ignacio M.D.       • levothyroxine (SYNTHROID) tablet 175 mcg  175 mcg Enteral Tube AM ES Angel Ignacio M.D.   175 mcg at 10/31/19 0436   • Pharmacy Consult: Enteral tube insertion - review meds/change route/product selection  1 Each Other PHARMACY TO DOSE Angel Ignacio M.D.       • ipratropium-albuterol (DUONEB) nebulizer solution  3 mL Nebulization Q4HRS (RT) Murali Palacio M.D.   3 mL at 10/31/19 0723   • predniSONE (DELTASONE) tablet 40 mg  40 mg Enteral Tube DAILY Murali Palacio M.D.   40 mg at 10/31/19 0437   • enoxaparin (LOVENOX) inj 40 mg  40 mg Subcutaneous Q12HRS Murali Palacio M.D.   40 mg at 10/31/19 0437   • Metoprolol Tartrate  (LOPRESSOR) injection 5 mg  5 mg Intravenous Q6HRS PRN Keyshawn Diaz M.D.       • ondansetron (ZOFRAN) syringe/vial injection 4 mg  4 mg Intravenous Q4HRS PRN Hui Luna M.D.       • Respiratory Care per Protocol   Nebulization Continuous RT Ruddy Briscoe Jr. D.O.       • ipratropium-albuterol (DUONEB) nebulizer solution  3 mL Nebulization Q2HRS PRN (RT) ANITHA Nails Jr..O.       • senna-docusate (PERICOLACE or SENOKOT S) 8.6-50 MG per tablet 2 Tab  2 Tab Enteral Tube BID ANITHA Nails Jr..O.   2 Tab at 10/31/19 0437    And   • polyethylene glycol/lytes (MIRALAX) PACKET 1 Packet  1 Packet Enteral Tube QDAY PRN ANITHA Nails Jr..O.        And   • magnesium hydroxide (MILK OF MAGNESIA) suspension 30 mL  30 mL Enteral Tube QDAY PRN ANITHA Nails Jr..O.        And   • bisacodyl (DULCOLAX) suppository 10 mg  10 mg Rectal QDAY PRN Ruddy Briscoe Jr. D.O.       • MD Alert...ICU Electrolyte Replacement per Pharmacy   Other PHARMACY TO DOSE ANITHA Nails Jr..O.       • lidocaine (XYLOCAINE) 1 % injection 1-2 mL  1-2 mL Tracheal Tube Q30 MIN PRN ANITHA Nails Jr..O.       • ondansetron (ZOFRAN ODT) dispertab 4 mg  4 mg Enteral Tube Q4HRS PRN Hui Luna M.D.       • norepinephrine (LEVOPHED) 8 mg in  mL Infusion  0-30 mcg/min Intravenous Continuous Angel Ignacio M.D.   Stopped at 10/29/19 0500       Fluids    Intake/Output Summary (Last 24 hours) at 10/31/2019 0723  Last data filed at 10/31/2019 0534  Gross per 24 hour   Intake 2866.67 ml   Output 4680 ml   Net -1813.33 ml       Laboratory  Recent Labs     10/28/19  2208 10/29/19  0518 10/30/19  0530   ISTATAPH 7.305* 7.369* 7.475   ISTATAPCO2 67.9* 51.2* 38.9*   ISTATAPO2 45* 54* 51*   ISTATATCO2 36* 31 30   [id number] 75* 86* 88*   ISTATARTHCO3 33.8* 29.5* 28.6*   ISTATARTBE 5* 3 5*   ISTATTEMP 37.0 C 36.3 C 37.2 C   ISTATFIO2 80 60 55   ISTATSPEC Arterial Arterial Arterial   ISTATAPHTC 7.305* 7.379* 7.472    [id number] 45* 51* 52*         Recent Labs     10/28/19  1940 10/28/19  2204  10/30/19  0447 10/30/19  1752 10/31/19  0306   SODIUM 142 139   < > 140 143 143   POTASSIUM 4.1 3.5*   < > 3.8 3.7 3.2*   CHLORIDE 105 102   < > 102 101 101   CO2 34* 34*   < > 32 35* 35*   BUN 19 19   < > 29* 29* 25*   CREATININE 0.81 0.74   < > 1.04 1.00 0.85   MAGNESIUM  --  1.7  --  2.3  --  2.1   PHOSPHORUS 2.2*  --   --  1.1*  --  2.0*   CALCIUM 7.9* 8.7   < > 9.1 8.4* 8.2*    < > = values in this interval not displayed.     Recent Labs     10/28/19  1940  10/29/19  0110  10/30/19  0447 10/30/19  1752 10/31/19  0306   ALTSGPT 37  --  35  --   --  22  --    ASTSGOT 53*  --  44  --   --  17  --    ALKPHOSPHAT 163*  --  174*  --   --  147*  --    TBILIRUBIN 0.8  --  0.9  --   --  0.5  --    PREALBUMIN  --   --   --   --  15.0*  --   --    GLUCOSE 172*   < > 153*   < > 111* 116* 91    < > = values in this interval not displayed.     Recent Labs     10/28/19  1940 10/29/19  0110 10/30/19  0447 10/30/19  1752 10/31/19  0306   WBC 12.3* 12.2* 9.5  --  6.5   NEUTSPOLYS 90.40* 90.20* 88.10*  --   --    LYMPHOCYTES 1.80* 4.40* 5.50*  --   --    MONOCYTES 1.70 4.60 5.80  --   --    EOSINOPHILS 0.00 0.00 0.00  --   --    BASOPHILS 0.00 0.20 0.20  --   --    ASTSGOT 53* 44  --  17  --    ALTSGPT 37 35  --  22  --    ALKPHOSPHAT 163* 174*  --  147*  --    TBILIRUBIN 0.8 0.9  --  0.5  --      Recent Labs     10/29/19  0110 10/30/19  0447 10/31/19  0306   RBC 4.77 4.01* 4.06*   HEMOGLOBIN 15.0 12.7 12.6   HEMATOCRIT 50.3* 41.1 41.8   PLATELETCT 134* 139* 114*       Imaging  X-Ray:  I have personally reviewed the images and compared with prior images.  EKG:  I have personally reviewed the images and compared with prior images.  CT:    Reviewed    Assessment/Plan  * Respiratory arrest (HCC)- (present on admission)  Assessment & Plan  PEA due to hypoxia  Arousable, no hypothermic protocol  On ventilator  Extubated once tolerates  No ct head  necessary as follows all commands  Levophed for map > 65  Extubated 10/31  On 15 L o2 this morning, diuresis, up to chair  Wean for sao2 88% or higher    Atrial flutter (HCC)  Assessment & Plan  Likely from hypoxia  Continue to monitor  Unclear when started  Increase lovenox to wt based for now  May need warfarin vs noac    Ventricular tachycardia (HCC)- (present on admission)  Assessment & Plan  Associated with cardiac arrest  Optimize electrolytes  Continue amiodarone po  Cardiac monitoring  Secondary to severe hypoxia likely  No planned intervention per cardiology  diuresis    Hypomagnesemia- (present on admission)  Assessment & Plan  Supplement for Mg > 2    Encephalopathy- (present on admission)  Assessment & Plan  Alert and oriented  Likely from severe hypoxia and episode pea and vtach  No hypothermic or ct head as following commands, moving all extremities  If any focal deficits will get ct head    Hypotension- (present on admission)  Assessment & Plan  Continue Levophed as needed for vasopressor support, keep map > 65    Elevated troponin- (present on admission)  Assessment & Plan  Type II MI    Thrombocytopenia (HCC)- (present on admission)  Assessment & Plan  Mild, 120  No active bleeding  Daily cbc    Morbid obesity (HCC)- (present on admission)  Assessment & Plan  RD consult for tube feeds  Contributory to respiratory failure    Acute on chronic respiratory failure with hypoxia and hypercapnia (HCC)  Assessment & Plan  Intubated for respiratory failure/hypoxia/cardiac arrest with vtach and pea  Likely from severe hypoxia, found w/o o2 that she uses at home  Alert andoriented  Wean sedation  On propofol drip/fentanyl drip  Weaned peep, on 14 and fio2 70%  Managed to get to 8 peep and 60% fio2  sbt  Extubated 10/30  High flow prn if necessary as not significant consolidations on CT  Patient hardly tolerates mask so limited chance that bipap would be tolerated         VTE:  Lovenox  Ulcer: Not  Indicated  Lines: Central Line  Ongoing indication addressed and Lam Catheter  Ongoing indication addressed    I have performed a physical exam and reviewed and updated ROS and Plan today (10/31/2019). In review of yesterday's note (10/30/2019), there are no changes except as documented above.     Discussed patient condition and risk of morbidity and/or mortality with Hospitalist, Family, RN, RT, Pharmacy, Code status disscussed, Charge nurse / hot rounds and Patient     The patient remains critically ill.  On high oxygen requirements requiring 15 L per mask.  High risk for worsening respiratory failure and requirement for non invasive or invasive mechanical ventilation.  Critical care time = 35 minutes in directly providing and coordinating critical care and extensive data review.  No time overlap and excludes procedures.

## 2019-10-31 NOTE — DISCHARGE PLANNING
Care Transition Team Assessment  ANANTK Vahe miller [place] @ [phone].  Lsw spoke to 2nd emergency contact friend/caregiver Deb @ cell [phone].  Deb reports pt lives alone in her own apartment and will most likely not want to go to a higher level of care. Pt has an estranged daughter, Kirk Lipscomb, in [place] or [facility]. They have not spoken for 5+ years. Deb messaged her on facebook to advise of pt's hospital admission.    Pt usually lives alone in her own apartment. Both Candy and Mary Lou (cell [phone] home [phone]) check on pt. Deb states pt was previously independent, but lately is unable to remember to pay her bills, take her meds, and even eat. They have found food in pt's bed rotten. Pt has a pile of items on her bed and the food has been found in the pile.    Pt was w/c bound and able to pivot to bed and toilet previously, but now pt is falling more.    Pt went to SNF in [place] after her last [facility] admission this summer.     During rounds, Lsw witnessed pt being transferred via a slide board w/ (three people support) to a cardiac chair.        Information Source  Orientation : Oriented x 4  Information Given By: Caregiver, Friend  Informant's Name: Deb Bahena @ cell [phone]  Who is responsible for making decisions for patient? : Patient    Readmission Evaluation  Is this a readmission?: No(d/c in June to SNF w/c bound then)    Elopement Risk  Legal Hold: No  Ambulatory or Self Mobile in Wheelchair: No-Not an Elopement Risk  Elopement Risk: Not at Risk for Elopement    Interdisciplinary Discharge Planning  Primary Care Physician: Dr Adan, will retire soon   Lives with - Patient's Self Care Capacity: Alone and Unable to Care For Self  Support Systems: (friends Deb and Mary Lou check in w/ pt )  Housing / Facility: 1 Story Apartment / Condo  Do You Take your Prescribed Medications Regularly: (before memory slipped recently, was independt w/ meds)  Able to Return to Previous  ADL's: (w/c bound and able to pivot to bed and toilet prior to admsn)  Mobility Issues: (w/c bound)  Prior Services: (referral out to [facility]- in home sup. servcs.)  Patient Expects to be Discharged to:: (pt will want to return home to apt alone, not able)  Assistance Needed: (pt forgetting to pay bills, take meds, eat and falling more )  Durable Medical Equipment: (w/c)    Discharge Preparedness  What is your plan after discharge?: (SNF and long term care bed possibly)  What are your discharge supports?: (friends are not able to provide the higher level needed )  Prior Functional Level: Needs Assist with Activities of Daily Living, Needs Assist with Medication Management, Uses Wheelchair    Functional Assesment  Prior Functional Level: Needs Assist with Activities of Daily Living, Needs Assist with Medication Management, Uses Wheelchair    Finances  Prescription Coverage: Yes    Vision / Hearing Impairment  Hearing Impairment: Patient Declines to Wear Hearing Device(s)  Does Pt Need Special Equipment for the Hearing Impaired?: No                             Anticipated Discharge Information  Anticipated discharge disposition: SNF  Discharge Address: physical: [address] [place] [address]

## 2019-10-31 NOTE — CARE PLAN
Problem: Bronchoconstriction:  Goal: Improve in air movement and diminished wheezing  Note:   DUO Q4

## 2019-10-31 NOTE — PROGRESS NOTES
Reason for consultation /admission : Status post out of the hospital PEA arrest with reported VT    Extubated yesterday.  Developed respiratory distress this morning requiring BiPAP associated with atrial fib/flutter with  ventricular response in the 110s per RN.  Hemodynamically stable.   She has been started on oral metoprolol 25 mg twice a day.    EKG during the event by my review showed atrial flutter with ventricular rate of 93 bpm    Currrently on face mask 10 lpm  O2 sat 88%    Review of systems;    General: No fever,   HENT: No sore throat, no neck pain  Eyes: No blurred vision or double vision  Heart: No palpitation, +leg swelling  Lung: No productive cough, no hemoptysis  Abdomen: No abdominal pain, no nausea vomiting or blood in stool  : No dysuria, no frequency or hesitancy, no hematuria  Musculoskeletal: + back pain, some joint pain  Hematology: No easy bruising  Skin: No rash or itching  Neurological: No headache, no new focal weakness or numbness  Psychological: Denies anxiety   All other review of systems are negative      Pulse:  [] 81  Resp:  [13-31] 17  BP: ()/(62-93) 105/73  SpO2:  [87 %-95 %] 90 %  GENERAL not in acute distress, not dyspnic at rest, drowsy but arousable  Generalizedly edematous  ENT difficult to assess JVD, no carotid bruits or thyromegaly  Lung decreased BS RLLL, distant sound, + wheezing  Heart RRR, normal rate, no murmur, no gallop or rub  Abd soft, no tenderness, mass or bruits  Ext 1-2+ edema  Skin no ecchymosis or petechiae  Musculoskeletal no deformity  Neuro grossly intact  Psych normal mood, normal affect    Monitor reviewed by me showed no significant ventricular or atrial dysrhythmias.    Labs: Cr 0.8, K+ 3.2  N-terminal proBNP 480    Assessment and plans    1.  Paroxysmal atrial flutter/fibrillation likely precipitated by hypoxemia and acute stress from respiratory distress  Now on low dose metoprolol. Also with thyroid dysfunction.  Will start oral  amiodarone, plan on short term use during her current episode of respiratory failure  Optimize respiratory status/oxygenation    2. S/p OOH arrest, PEA by report more likely respiratory than cardiac  Reported VT by EMS during resuscitation  No documentation  No recurrence since admission    3. COPD/RLL infiltrates/respirator failure  ECHO 10/29: NL LVSF. No mentioned of RVSP  Elevated BNP probably RV strain +/- some degree of DHF from obesity  Agree with diuresis    Will follow    Please note that this dictation was created using voice recognition software. I have worked with consultants from the vendor as well as technical experts from [employer] to optimize the interface. I have made every reasonable attempt to correct obvious errors, but I expect that there are errors of grammar and possibly content I did not discover before finalizing the note

## 2019-10-31 NOTE — CARE PLAN
Problem: Communication  Goal: The ability to communicate needs accurately and effectively will improve  Outcome: PROGRESSING AS EXPECTED     Problem: Safety  Goal: Will remain free from injury  Outcome: PROGRESSING AS EXPECTED  Goal: Will remain free from falls  Outcome: PROGRESSING AS EXPECTED     Problem: Infection  Goal: Will remain free from infection  Outcome: PROGRESSING AS EXPECTED     Problem: Venous Thromboembolism (VTW)/Deep Vein Thrombosis (DVT) Prevention:  Goal: Patient will participate in Venous Thrombosis (VTE)/Deep Vein Thrombosis (DVT)Prevention Measures  Outcome: PROGRESSING AS EXPECTED     Problem: Bowel/Gastric:  Goal: Normal bowel function is maintained or improved  Outcome: PROGRESSING AS EXPECTED  Goal: Will not experience complications related to bowel motility  Outcome: PROGRESSING AS EXPECTED

## 2019-10-31 NOTE — PROGRESS NOTES
[facility] Medicine Daily Progress Note    Date of Service  10/31/2019    Chief Complaint  69 y.o. female admitted 10/28/2019 with COPD chronically on 2 L, hypothyroidism, morbid obesity, [facility] with the patient apparently was found without oxygen secondary to power outage.  Found unconscious, brought to [facility] ER with the patient had a significant respiratory acidosis with a pH of 7.0, following the patient had a PEA cardiac arrest, admitted hypotensive, intubated, placed on amiodarone for dysrhythmia, no additional evidence of pulmonary embolism  Cardiology consulted, question of LOWELL marinelli, cardiac arrest likely secondary to hypoxia and acidosis  Hospital Course    Admitted from [facility] with respiratory arrest, cardiac arrest      Interval Problem Update  Patient seen and examined today. ICU Care  Care and plan discussed in IDT/Hot rounds.  Lines and assistive devices reviewed.    Patient tolerating treatment and therapies.  All Data, Medication data reviewed.  Case discussed with nursing as available.  Plan of Care reviewed with patient and notified of changes.  10/30 the patient is improving on mechanical ventilation, alert and following, later extubated, blood pressure sufficient, afebrile, after extubation patient is somewhat lethargic, complaining of body aches, states she is usually on 3 L oxygen at home.  Some cough, no excessive sputum production.  10/31 patient improving, remains on oxygen therapy at 15 L, T-max 99, sinus rhythm, sitting up in chair, feels sore overall, tolerating diuresis, high flow nasal cannula delivery system PRN, poor p.o. intake.  Consultants/Specialty  Pulmonary critical care  Radiology    Code Status  Full code    Disposition  ICU post extubation, close respiratory watch    Review of Systems  Review of Systems   Constitutional: Positive for malaise/fatigue. Negative for chills and fever.   HENT: Negative.    Eyes: Negative.     Respiratory: Positive for cough, sputum production and shortness of breath.    Cardiovascular: Positive for chest pain. Negative for palpitations.   Gastrointestinal: Negative.  Negative for heartburn, nausea and vomiting.   Genitourinary: Negative.  Negative for dysuria and frequency.   Musculoskeletal: Positive for back pain and myalgias. Negative for neck pain.   Skin: Negative.  Negative for itching and rash.   Neurological: Negative.  Negative for dizziness, focal weakness, weakness and headaches.   Endo/Heme/Allergies: Negative.  Negative for polydipsia. Does not bruise/bleed easily.   Psychiatric/Behavioral: Negative for depression. The patient is nervous/anxious.         Physical Exam  Pulse:  [] 81  Resp:  [13-31] 17  BP: ()/(62-93) 105/73  SpO2:  [87 %-95 %] 90 %    Physical Exam   Constitutional: She is oriented to person, place, and time. She appears well-developed and well-nourished. She appears lethargic. She has a sickly appearance. Nasal cannula in place.   HENT:   Head: Normocephalic and atraumatic.   Nose: Nose normal.   Mouth/Throat: Oropharynx is clear and moist.   Eyes: Pupils are equal, round, and reactive to light. Conjunctivae and EOM are normal.   Neck: Normal range of motion. Neck supple. No JVD present. No thyromegaly present.   Cardiovascular: Normal rate, regular rhythm, normal heart sounds and intact distal pulses. Exam reveals no gallop and no friction rub.   Capillary refill <3 secs   Pulmonary/Chest: Effort normal. She has wheezes. She has rhonchi. She has rales.   Abdominal: Soft. Bowel sounds are normal. She exhibits no distension and no mass. There is no tenderness. There is no rebound and no guarding.   Musculoskeletal: Normal range of motion. She exhibits no edema or tenderness.   Lymphadenopathy:     She has no cervical adenopathy.   Neurological: She is oriented to person, place, and time. She appears lethargic. She displays normal reflexes. No cranial nerve  deficit. Coordination normal.   Skin: Skin is warm and dry. She is not diaphoretic. No cyanosis.   Nursing note and vitals reviewed.      Fluids    Intake/Output Summary (Last 24 hours) at 10/31/2019 0749  Last data filed at 10/31/2019 0534  Gross per 24 hour   Intake 2866.67 ml   Output 4680 ml   Net -1813.33 ml       Laboratory  Recent Labs     10/29/19  0110 10/30/19  0447 10/31/19  0306   WBC 12.2* 9.5 6.5   RBC 4.77 4.01* 4.06*   HEMOGLOBIN 15.0 12.7 12.6   HEMATOCRIT 50.3* 41.1 41.8   .5* 102.5* 103.0*   MCH 31.4 31.7 31.0   MCHC 29.8* 30.9* 30.1*   RDW 51.6* 50.2* 51.4*   PLATELETCT 134* 139* 114*   MPV 10.6 11.2 10.4     Recent Labs     10/30/19  0447 10/30/19  1752 10/31/19  0306   SODIUM 140 143 143   POTASSIUM 3.8 3.7 3.2*   CHLORIDE 102 101 101   CO2 32 35* 35*   GLUCOSE 111* 116* 91   BUN 29* 29* 25*   CREATININE 1.04 1.00 0.85   CALCIUM 9.1 8.4* 8.2*             Recent Labs     10/28/19  2204 10/30/19  0447   TRIGLYCERIDE 103 164*       Imaging  DX-CHEST-PORTABLE (1 VIEW)   Final Result      1.  Removal of endotracheal tube and enteric catheters      2.  No other change      DX-CHEST-PORTABLE (1 VIEW)   Final Result      Improved left upper lobe atelectasis and consolidation      Otherwise stable right worse than left consolidation and atelectasis with probable right pleural fluid      Bilateral acute rib fractures      EC-ECHOCARDIOGRAM COMPLETE W/O CONT   Final Result      DX-ABDOMEN FOR TUBE PLACEMENT   Final Result      Feeding tube in place as noted above.      DX-CHEST-PORTABLE (1 VIEW)   Final Result      Improving right upper lobe atelectasis. No significant change otherwise.      DX-ABDOMEN FOR TUBE PLACEMENT   Final Result      Nasogastric tube in place as noted above.      CT-CTA CHEST PULMONARY ARTERY W/ RECONS   Final Result      1.  No evidence of pulmonary emboli   2.  Extensive airspace opacities and atelectasis with marked volume loss in the right lung. A small right pleural  effusion is also present, along with a tiny right pneumothorax.   3.  Patchy airspace opacities in the left upper lobe as well as left lower lobe atelectasis with tiny left pleural effusion.            DX-CHEST-PORTABLE (1 VIEW)   Final Result      Diffuse bilateral interstitial opacities, suggesting pulmonary edema. Additionally there are confluent and linear opacities throughout the right perihilar and upper lung field and in the left base, consistent with a combination of pneumonia and    atelectasis. All of this is considerably worse compared with the prior image.           Assessment/Plan  * Respiratory arrest (HCC)- (present on admission)  Assessment & Plan  Secondary to profound hypoxia, respiratory compromise  Negative for pulmonary embolism  Chronically oxygen dependent  Pulmonary critical care following    Ventricular tachycardia (HCC)- (present on admission)  Assessment & Plan  Initially on amiodarone, cardiology following, antiarrhythmic therapy discontinued, monitor  Echocardiogram noted with good EF      Hypomagnesemia- (present on admission)  Assessment & Plan  Replace and recheck labs    Encephalopathy- (present on admission)  Assessment & Plan  Improving, no definitive anoxic brain injury  Follow clinically  Monitor      Hypotension- (present on admission)  Assessment & Plan  Has resolved, monitor    Elevated troponin- (present on admission)  Assessment & Plan  Due to cardiac arrest  Telemetry monitoring  Cardiology following      Thrombocytopenia (HCC)- (present on admission)  Assessment & Plan  Mild, cont to monitor    Morbid obesity (HCC)- (present on admission)  Assessment & Plan  Encourage weight loss when appropriate    Acute on chronic respiratory failure with hypoxia and hypercapnia (HCC)  Assessment & Plan  Due to significant hypercarbia, found to be hypoxic, acidemic, apparently without oxygen in place  Subsequently intbuated 10/28/2019, extubated 10/30  Continue respiratory protocol  Wean  oxygen as tolerated  Pulmonary toilet and continue bronchodilators    Plan  Close monitoring post extubation, wean oxygen as tolerated, high flow nasal cannula as needed  Continue treatment for COPD  Steroids to finish  Broncho-dilators to continue  Pulmonary toilet to continue  Wean oxygen as tolerated  Consider slight diuresis as tolerated  Mobilize as tolerated  A.m. Labs  Balance electrolytes  See orders  Medically complex high risk    VTE prophylaxis: Heparin    I have performed a physical exam and reviewed and updated ROS and Plan today . In review of yesterday's note , there are no changes except as documented above.

## 2019-10-31 NOTE — ASSESSMENT & PLAN NOTE
Likely from hypoxia      Change to noac, now on xarelto  Poor candidate long term amiodarone  Likely will change to bb only as source of PEA due to hypoxia

## 2019-10-31 NOTE — PROGRESS NOTES
Pt now with afib/aflutter and increased oxygen demands up to 15L Oxymask at this time. Dr. Ignacio notified. New orders received.

## 2019-10-31 NOTE — DIETARY
Nutrition Services: Transition to PO diet    Admit day 3.  Pt extubated 10/30 and started on PO diet.  TF D/c'd.  PO intake 25-50% of 1 recorded meal in ADLs.  Per SW note, pt has had some element of poor PO intake PTA.    RD will monitor intake and intervene as needed.  Nutrition Representative will see pt daily for menu options.

## 2019-10-31 NOTE — THERAPY
"Physical Therapy Evaluation completed.   Bed Mobility:  Supine to Sit: Total Assist X 2  Transfers: Sit to Stand: Minimal Assist(x 2)  Gait: Level Of Assist: Unable to Participate Plan of Care: Will benefit from Physical Therapy 3 times per week  Discharge Recommendations: Equipment: Will Continue to Assess for Equipment Needs. Post-acute therapy Discharge to a transitional care facility for continued skilled therapy services.    Pt is seen today s/p cardiac arrest. Today, pt needs total assist to come to EOB, but once up, needed min A x 2 for sit to stand, unable to tolerate ambulation due to SOB. Pt is assisted to cardiac chair but would likely be able to to stand pivot to regular recliner if one can be located. Pt reports at baseline, she walks only a short distance at home and spends rest of her time in bed or in w/c. Pt will be followed by PT and will need a transitional care stay upon d/c.  See \"Rehab Therapy-Acute\" Patient Summary Report for complete documentation.     "

## 2019-10-31 NOTE — CARE PLAN
Problem: Nutritional:  Goal: Achieve adequate nutritional intake  Description  Patient will consume 50% of most meals.   Outcome: NOT MET     Please record all PO intake in ADLs.

## 2019-10-31 NOTE — PROGRESS NOTES
12 hour chart check    Monitor summary:  SR with rare short periods of a-fib 67-94  0.20/0.12/0.32

## 2019-10-31 NOTE — PROGRESS NOTES
Called by nurse patient had episode of rapid afib/flutter now with increase oxygen requirement on 15l oxy mask sats 88%    Patient know to me from other night was admitted for pea arrest then VT and intubated and transferred here.     Labs and CXR reviewed k 3.2 will start oral k replacement, mag 2.1 will give 2 grams, ekg ordered, CXR shows persistent lose of right lower lobe chronic but with atelectasis and edema.   Start oral metoprolol 25mg BID  Will start Bipap for respiratory support for pulmonary edema.  Discussed with patient plan of care and agrees.   Could consider lower levothyroxine to 150 mcg.  Close monitor need for intubation     Patient remains in critical condition from hypoxemia atrial fibrillation and volume overload needing bipap for respiratory support to prevent need for intubation . Critical care time provided was 50 minutes. This excludes all separate billable procedures.

## 2019-10-31 NOTE — PROGRESS NOTES
[facility] Medicine Daily Progress Note    Date of Service  10/30/2019    Chief Complaint  69 y.o. female admitted 10/28/2019 with COPD chronically on 2 L, hypothyroidism, morbid obesity, [facility] with the patient apparently was found without oxygen secondary to power outage.  Found unconscious, brought to [facility] ER with the patient had a significant respiratory acidosis with a pH of 7.0, following the patient had a PEA cardiac arrest, admitted hypotensive, intubated, placed on amiodarone for dysrhythmia, no additional evidence of pulmonary embolism  Cardiology consulted, question of LOWELL marinelli, cardiac arrest likely secondary to hypoxia and acidosis  Hospital Course    Admitted from [facility] with respiratory arrest, cardiac arrest      Interval Problem Update  Patient seen and examined today. ICU Care  Care and plan discussed in IDT/Hot rounds.  Lines and assistive devices reviewed.    Patient tolerating treatment and therapies.  All Data, Medication data reviewed.  Case discussed with nursing as available.  Plan of Care reviewed with patient and notified of changes.  10/30 the patient is improving on mechanical ventilation, alert and following, later extubated, blood pressure sufficient, afebrile, after extubation patient is somewhat lethargic, complaining of body aches, states she is usually on 3 L oxygen at home.  Some cough, no excessive sputum production.  Consultants/Specialty  Pulmonary critical care  Radiology    Code Status  Full code    Disposition  ICU post extubation    Review of Systems  Review of Systems   Unable to perform ROS: Mental acuity        Physical Exam  Temp:  [37.1 °C (98.8 °F)-37.7 °C (99.9 °F)] 37.1 °C (98.8 °F)  Pulse:  [69-94] 91  Resp:  [1-26] 16  BP: ()/(53-96) 132/93  SpO2:  [87 %-96 %] 90 %    Physical Exam   Constitutional: She is oriented to person, place, and time. She appears well-developed and well-nourished. She appears  lethargic. She has a sickly appearance. Nasal cannula in place.   HENT:   Head: Normocephalic and atraumatic.   Nose: Nose normal.   Mouth/Throat: Oropharynx is clear and moist.   Eyes: Pupils are equal, round, and reactive to light. Conjunctivae and EOM are normal.   Neck: Normal range of motion. Neck supple. No JVD present. No thyromegaly present.   Cardiovascular: Normal rate, regular rhythm, normal heart sounds and intact distal pulses. Exam reveals no gallop and no friction rub.   Capillary refill <3 secs   Pulmonary/Chest: Effort normal. She has wheezes. She has rhonchi. She has rales.   Abdominal: Soft. Bowel sounds are normal. She exhibits no distension and no mass. There is no tenderness. There is no rebound and no guarding.   Musculoskeletal: Normal range of motion. She exhibits no edema or tenderness.   Lymphadenopathy:     She has no cervical adenopathy.   Neurological: She is oriented to person, place, and time. She appears lethargic. She displays normal reflexes. No cranial nerve deficit. Coordination normal.   Skin: Skin is warm and dry. She is not diaphoretic. No cyanosis.   Nursing note and vitals reviewed.      Fluids    Intake/Output Summary (Last 24 hours) at 10/30/2019 1815  Last data filed at 10/30/2019 1650  Gross per 24 hour   Intake 3361.67 ml   Output 3310 ml   Net 51.67 ml       Laboratory  Recent Labs     10/28/19  1940 10/29/19  0110 10/30/19  0447   WBC 12.3* 12.2* 9.5   RBC 4.57 4.77 4.01*   HEMOGLOBIN 14.3 15.0 12.7   HEMATOCRIT 49.3* 50.3* 41.1   .9* 105.5* 102.5*   MCH 31.3 31.4 31.7   MCHC 29.0* 29.8* 30.9*   RDW 52.5* 51.6* 50.2*   PLATELETCT 120* 134* 139*   MPV 10.7 10.6 11.2     Recent Labs     10/29/19  0110 10/29/19  0256 10/30/19  0447   SODIUM 134* 137 140   POTASSIUM 7.0* 4.9 3.8   CHLORIDE 102 101 102   CO2 29 33 32   GLUCOSE 153* 173* 111*   BUN 21 22 29*   CREATININE 0.80 0.92 1.04   CALCIUM 8.8 9.8 9.1             Recent Labs     10/28/19  2208 10/30/19  9873    TRIGLYCERIDE 103 164*       Imaging  DX-CHEST-PORTABLE (1 VIEW)   Final Result      Improved left upper lobe atelectasis and consolidation      Otherwise stable right worse than left consolidation and atelectasis with probable right pleural fluid      Bilateral acute rib fractures      EC-ECHOCARDIOGRAM COMPLETE W/O CONT   Final Result      DX-ABDOMEN FOR TUBE PLACEMENT   Final Result      Feeding tube in place as noted above.      DX-CHEST-PORTABLE (1 VIEW)   Final Result      Improving right upper lobe atelectasis. No significant change otherwise.      DX-ABDOMEN FOR TUBE PLACEMENT   Final Result      Nasogastric tube in place as noted above.      CT-CTA CHEST PULMONARY ARTERY W/ RECONS   Final Result      1.  No evidence of pulmonary emboli   2.  Extensive airspace opacities and atelectasis with marked volume loss in the right lung. A small right pleural effusion is also present, along with a tiny right pneumothorax.   3.  Patchy airspace opacities in the left upper lobe as well as left lower lobe atelectasis with tiny left pleural effusion.            DX-CHEST-PORTABLE (1 VIEW)   Final Result      Diffuse bilateral interstitial opacities, suggesting pulmonary edema. Additionally there are confluent and linear opacities throughout the right perihilar and upper lung field and in the left base, consistent with a combination of pneumonia and    atelectasis. All of this is considerably worse compared with the prior image.           Assessment/Plan  * Respiratory arrest (HCC)- (present on admission)  Assessment & Plan  Secondary to profound hypoxia, respiratory compromise  Negative for pulmonary embolism  Chronically oxygen dependent  Pulmonary critical care following    Ventricular tachycardia (HCC)- (present on admission)  Assessment & Plan  Initially on amiodarone, cardiology following, antiarrhythmic therapy discontinued, monitor  Echocardiogram noted with good EF      Hypomagnesemia- (present on  admission)  Assessment & Plan  Replace and recheck labs    Encephalopathy- (present on admission)  Assessment & Plan  Improving, no definitive anoxic brain injury  Follow clinically  Monitor      Hypotension- (present on admission)  Assessment & Plan  Has resolved, monitor    Elevated troponin- (present on admission)  Assessment & Plan  Due to cardiac arrest  Telemetry monitoring  Cardiology following      Thrombocytopenia (HCC)- (present on admission)  Assessment & Plan  Mild, cont to monitor    Morbid obesity (HCC)- (present on admission)  Assessment & Plan  Encourage weight loss when appropriate    Acute on chronic respiratory failure with hypoxia and hypercapnia (HCC)  Assessment & Plan  Due to significant hypercarbia, found to be hypoxic, acidemic, apparently without oxygen in place  Subsequently intbuated 10/28/2019, extubated 10/30  Continue respiratory protocol  Wean oxygen as tolerated  Pulmonary toilet and continue bronchodilators    Plan  Close monitoring post extubation  Continue treatment for COPD  Steroids  Broncho-dilators  Pulmonary toilet  Wean oxygen as tolerated  Consider slight diuresis as tolerated  A.m. Labs  Balance electrolytes  See orders  Medically complex high risk    VTE prophylaxis: Heparin    I have performed a physical exam and reviewed and updated ROS and Plan today . In review of yesterday's note , there are no changes except as documented above.

## 2019-10-31 NOTE — THERAPY
"Occupational Therapy Evaluation completed.   Functional Status: Pt is a 68yo female admitted after cardiac arrest, now extubated. PMHx of COPD with home O2 and CHF. Req encouragement to participate. Supine>sit with total A x2. Seated grooming with min A; req encouragement to complete grooming w/o assist, reporting she \"wants to be pampered.\"  Educated on purpose and importance of therapy, may need reinforcement. Sitting balance with SPV. Sit>stand with mod A (min x2) with FWW; limited by SOB and pain. Reported severe pain while seated EOB, so txf'd to cardiac chair per RN request. Left seated in cardiac chair with nsg. PLOF dependent with all IADLs, and assist with most ADLs; reports only stand pivot txfs to w/c and toilet, taking only a few steps to sink using FWW. Currently limited by decreased functional mobility, activity tolerance, strength, balance, and pain which are currently affecting pt's ability to complete ADLs/IADLs at baseline. Currently recommend acute OT, and Recommend post-acute placement for additional occupational therapy services prior to discharge home.    Plan of Care: Will benefit from Occupational Therapy 3 times per week  Discharge Recommendations:  Equipment: Will Continue to Assess for Equipment Needs. Post-acute therapy: Recommend post-acute placement for additional occupational therapy services prior to discharge home.       See \"Rehab Therapy-Acute\" Patient Summary Report for complete documentation.    "

## 2019-10-31 NOTE — CARE PLAN
Respiratory Update    Treatment modality: Svn, duo   Frequency:Q 4 HRS    Pt tolerating current treatments well with no adverse reactions.

## 2019-11-01 PROBLEM — Q24.0 DEXTROCARDIA: Status: ACTIVE | Noted: 2019-11-01

## 2019-11-01 LAB
ALBUMIN SERPL BCP-MCNC: 3.1 G/DL (ref 3.2–4.9)
ALBUMIN/GLOB SERPL: 1.1 G/DL
ALP SERPL-CCNC: 141 U/L (ref 30–99)
ALT SERPL-CCNC: 34 U/L (ref 2–50)
ANION GAP SERPL CALC-SCNC: 6 MMOL/L (ref 0–11.9)
AST SERPL-CCNC: 35 U/L (ref 12–45)
BASOPHILS # BLD AUTO: 0.1 % (ref 0–1.8)
BASOPHILS # BLD: 0.01 K/UL (ref 0–0.12)
BILIRUB SERPL-MCNC: 0.8 MG/DL (ref 0.1–1.5)
BUN SERPL-MCNC: 23 MG/DL (ref 8–22)
CALCIUM SERPL-MCNC: 8.3 MG/DL (ref 8.5–10.5)
CHLORIDE SERPL-SCNC: 99 MMOL/L (ref 96–112)
CO2 SERPL-SCNC: 36 MMOL/L (ref 20–33)
CREAT SERPL-MCNC: 0.96 MG/DL (ref 0.5–1.4)
EOSINOPHIL # BLD AUTO: 0.02 K/UL (ref 0–0.51)
EOSINOPHIL NFR BLD: 0.3 % (ref 0–6.9)
ERYTHROCYTE [DISTWIDTH] IN BLOOD BY AUTOMATED COUNT: 51.6 FL (ref 35.9–50)
GLOBULIN SER CALC-MCNC: 2.8 G/DL (ref 1.9–3.5)
GLUCOSE SERPL-MCNC: 93 MG/DL (ref 65–99)
HCT VFR BLD AUTO: 43.4 % (ref 37–47)
HGB BLD-MCNC: 13.3 G/DL (ref 12–16)
IMM GRANULOCYTES # BLD AUTO: 0.04 K/UL (ref 0–0.11)
IMM GRANULOCYTES NFR BLD AUTO: 0.6 % (ref 0–0.9)
LYMPHOCYTES # BLD AUTO: 0.64 K/UL (ref 1–4.8)
LYMPHOCYTES NFR BLD: 9.5 % (ref 22–41)
MAGNESIUM SERPL-MCNC: 1.9 MG/DL (ref 1.5–2.5)
MCH RBC QN AUTO: 31.5 PG (ref 27–33)
MCHC RBC AUTO-ENTMCNC: 30.6 G/DL (ref 33.6–35)
MCV RBC AUTO: 102.8 FL (ref 81.4–97.8)
MONOCYTES # BLD AUTO: 0.46 K/UL (ref 0–0.85)
MONOCYTES NFR BLD AUTO: 6.8 % (ref 0–13.4)
NEUTROPHILS # BLD AUTO: 5.55 K/UL (ref 2–7.15)
NEUTROPHILS NFR BLD: 82.7 % (ref 44–72)
NRBC # BLD AUTO: 0 K/UL
NRBC BLD-RTO: 0 /100 WBC
NT-PROBNP SERPL IA-MCNC: 488 PG/ML (ref 0–125)
PHOSPHATE SERPL-MCNC: 1.9 MG/DL (ref 2.5–4.5)
PLATELET # BLD AUTO: 120 K/UL (ref 164–446)
PMV BLD AUTO: 10.4 FL (ref 9–12.9)
POTASSIUM SERPL-SCNC: 3.8 MMOL/L (ref 3.6–5.5)
PROT SERPL-MCNC: 5.9 G/DL (ref 6–8.2)
RBC # BLD AUTO: 4.22 M/UL (ref 4.2–5.4)
SODIUM SERPL-SCNC: 141 MMOL/L (ref 135–145)
WBC # BLD AUTO: 6.7 K/UL (ref 4.8–10.8)

## 2019-11-01 PROCEDURE — 99291 CRITICAL CARE FIRST HOUR: CPT | Performed by: INTERNAL MEDICINE

## 2019-11-01 PROCEDURE — 94640 AIRWAY INHALATION TREATMENT: CPT

## 2019-11-01 PROCEDURE — 700102 HCHG RX REV CODE 250 W/ 637 OVERRIDE(OP): Performed by: INTERNAL MEDICINE

## 2019-11-01 PROCEDURE — 83880 ASSAY OF NATRIURETIC PEPTIDE: CPT

## 2019-11-01 PROCEDURE — 700111 HCHG RX REV CODE 636 W/ 250 OVERRIDE (IP): Performed by: INTERNAL MEDICINE

## 2019-11-01 PROCEDURE — 84100 ASSAY OF PHOSPHORUS: CPT

## 2019-11-01 PROCEDURE — 99233 SBSQ HOSP IP/OBS HIGH 50: CPT | Performed by: HOSPITALIST

## 2019-11-01 PROCEDURE — 700102 HCHG RX REV CODE 250 W/ 637 OVERRIDE(OP): Performed by: HOSPITALIST

## 2019-11-01 PROCEDURE — 99232 SBSQ HOSP IP/OBS MODERATE 35: CPT | Performed by: INTERNAL MEDICINE

## 2019-11-01 PROCEDURE — A9270 NON-COVERED ITEM OR SERVICE: HCPCS | Performed by: INTERNAL MEDICINE

## 2019-11-01 PROCEDURE — 700101 HCHG RX REV CODE 250: Performed by: INTERNAL MEDICINE

## 2019-11-01 PROCEDURE — 80053 COMPREHEN METABOLIC PANEL: CPT

## 2019-11-01 PROCEDURE — 700111 HCHG RX REV CODE 636 W/ 250 OVERRIDE (IP): Performed by: HOSPITALIST

## 2019-11-01 PROCEDURE — 770022 HCHG ROOM/CARE - ICU (200)

## 2019-11-01 PROCEDURE — 700105 HCHG RX REV CODE 258: Performed by: INTERNAL MEDICINE

## 2019-11-01 PROCEDURE — 83735 ASSAY OF MAGNESIUM: CPT

## 2019-11-01 PROCEDURE — A9270 NON-COVERED ITEM OR SERVICE: HCPCS | Performed by: HOSPITALIST

## 2019-11-01 PROCEDURE — 85025 COMPLETE CBC W/AUTO DIFF WBC: CPT

## 2019-11-01 RX ORDER — POTASSIUM CHLORIDE 20 MEQ/1
20 TABLET, EXTENDED RELEASE ORAL 2 TIMES DAILY
Status: DISCONTINUED | OUTPATIENT
Start: 2019-11-01 | End: 2019-11-06

## 2019-11-01 RX ORDER — FUROSEMIDE 10 MG/ML
20 INJECTION INTRAMUSCULAR; INTRAVENOUS
Status: DISCONTINUED | OUTPATIENT
Start: 2019-11-02 | End: 2019-11-04

## 2019-11-01 RX ORDER — MAGNESIUM SULFATE HEPTAHYDRATE 40 MG/ML
2 INJECTION, SOLUTION INTRAVENOUS ONCE
Status: COMPLETED | OUTPATIENT
Start: 2019-11-01 | End: 2019-11-01

## 2019-11-01 RX ADMIN — IPRATROPIUM BROMIDE AND ALBUTEROL SULFATE 3 ML: .5; 3 SOLUTION RESPIRATORY (INHALATION) at 15:35

## 2019-11-01 RX ADMIN — POTASSIUM CHLORIDE 20 MEQ: 20 TABLET, EXTENDED RELEASE ORAL at 18:34

## 2019-11-01 RX ADMIN — IPRATROPIUM BROMIDE AND ALBUTEROL SULFATE 3 ML: .5; 3 SOLUTION RESPIRATORY (INHALATION) at 12:19

## 2019-11-01 RX ADMIN — IPRATROPIUM BROMIDE AND ALBUTEROL SULFATE 3 ML: .5; 3 SOLUTION RESPIRATORY (INHALATION) at 07:14

## 2019-11-01 RX ADMIN — IPRATROPIUM BROMIDE AND ALBUTEROL SULFATE 3 ML: .5; 3 SOLUTION RESPIRATORY (INHALATION) at 19:27

## 2019-11-01 RX ADMIN — METOPROLOL TARTRATE 25 MG: 25 TABLET, FILM COATED ORAL at 04:44

## 2019-11-01 RX ADMIN — PREDNISONE 40 MG: 20 TABLET ORAL at 04:44

## 2019-11-01 RX ADMIN — ONDANSETRON 4 MG: 2 INJECTION INTRAMUSCULAR; INTRAVENOUS at 12:22

## 2019-11-01 RX ADMIN — AMIODARONE HYDROCHLORIDE 400 MG: 200 TABLET ORAL at 04:44

## 2019-11-01 RX ADMIN — NYSTATIN: 100000 POWDER TOPICAL at 06:00

## 2019-11-01 RX ADMIN — FUROSEMIDE 40 MG: 10 INJECTION, SOLUTION INTRAMUSCULAR; INTRAVENOUS at 04:44

## 2019-11-01 RX ADMIN — LEVOTHYROXINE SODIUM 175 MCG: 125 TABLET ORAL at 04:44

## 2019-11-01 RX ADMIN — METOPROLOL TARTRATE 25 MG: 25 TABLET, FILM COATED ORAL at 18:34

## 2019-11-01 RX ADMIN — SENNOSIDES AND DOCUSATE SODIUM 2 TABLET: 8.6; 5 TABLET ORAL at 04:44

## 2019-11-01 RX ADMIN — AMIODARONE HYDROCHLORIDE 400 MG: 200 TABLET ORAL at 18:34

## 2019-11-01 RX ADMIN — ENOXAPARIN SODIUM 120 MG: 150 INJECTION SUBCUTANEOUS at 18:41

## 2019-11-01 RX ADMIN — MAGNESIUM SULFATE IN WATER 2 G: 40 INJECTION, SOLUTION INTRAVENOUS at 08:20

## 2019-11-01 RX ADMIN — POTASSIUM CHLORIDE 40 MEQ: 20 TABLET, EXTENDED RELEASE ORAL at 04:44

## 2019-11-01 RX ADMIN — NYSTATIN: 100000 POWDER TOPICAL at 18:34

## 2019-11-01 RX ADMIN — ENOXAPARIN SODIUM 120 MG: 150 INJECTION SUBCUTANEOUS at 04:45

## 2019-11-01 RX ADMIN — POTASSIUM PHOSPHATE, MONOBASIC AND POTASSIUM PHOSPHATE, DIBASIC 30 MMOL: 224; 236 INJECTION, SOLUTION, CONCENTRATE INTRAVENOUS at 07:48

## 2019-11-01 ASSESSMENT — ENCOUNTER SYMPTOMS
BLOOD IN STOOL: 0
PHOTOPHOBIA: 0
NEUROLOGICAL NEGATIVE: 1
SHORTNESS OF BREATH: 1
BLURRED VISION: 0
BRUISES/BLEEDS EASILY: 0
EYES NEGATIVE: 1
POLYDIPSIA: 0
COUGH: 1
WHEEZING: 0
DIAPHORESIS: 0
PALPITATIONS: 0
FEVER: 0
GASTROINTESTINAL NEGATIVE: 1
SPUTUM PRODUCTION: 1
STRIDOR: 0
WEIGHT LOSS: 0
DOUBLE VISION: 0
MYALGIAS: 0
VOMITING: 0
NAUSEA: 0
CHILLS: 0
NERVOUS/ANXIOUS: 1
HEMOPTYSIS: 0
COUGH: 0
HEARTBURN: 0
FOCAL WEAKNESS: 0
DIZZINESS: 0
MYALGIAS: 1
DEPRESSION: 0
NECK PAIN: 0
HEADACHES: 0
SENSORY CHANGE: 0
WEAKNESS: 0
BACK PAIN: 1
SPEECH CHANGE: 0
SINUS PAIN: 0
TINGLING: 0
BACK PAIN: 0
SPUTUM PRODUCTION: 0
NERVOUS/ANXIOUS: 0

## 2019-11-01 NOTE — CARE PLAN
Problem: Bronchoconstriction:  Goal: Improve in air movement and diminished wheezing  Outcome: PROGRESSING AS EXPECTED   Duoneb Q4

## 2019-11-01 NOTE — PROGRESS NOTES
Pt continuing to need increased O2 support, 15 L Oxymask.    RT paged to place pt on High Flow per MD

## 2019-11-01 NOTE — PROGRESS NOTES
Reason for consultation /admission : PAF/flutter    Brief AF/flutter yesterday  Denies palpitations or SOB  On high flow canula  O2 sat high 80s to low 90s  BP borderline  Negative 2400 cc  On full dose enoxaparin  Less swollen    Review of systems;    General: No fever, chills,   HENT: No sore throat  Eyes: No blurred vision or double vision  Heart: No palpitation, no PND or orthopnea,+ leg swelling  Lung: No productive cough, no hemoptysis  Abdomen: No abdominal pain, no nausea or blood in stool  : No hematuria  Musculoskeletal: + back pain, some joint pain  Hematology: + easy bruising  Skin: No rash or itching  Neurological: No new focal weakness or numbness  Psychological: Denies anxiety or insomnia  All other review of systems are negative      Temp:  [36.7 °C (98 °F)-37.2 °C (99 °F)] 37 °C (98.6 °F)  Pulse:  [72-92] 80  Resp:  [13-34] 23  BP: ()/(56-81) 84/56  SpO2:  [87 %-97 %] 88 %  GENERAL not in acute distress, not dyspnic at rest  Head atraumatic, normocephalic  Eyes EOMI  ENT neck supple, no JVD, no carotid bruits or thyromegaly  Lung good expansion, distant sound, no rales or wheezing  Heart RRR, normal rate, no murmur,   no gallop or rub  Abd soft, no tenderness, mass or bruits  Ext trace edema  Skin + ecchymosis both feet  Musculoskeletal no deformity  Neuro grossly intact  Psych normal mood, normal affect    Monitor reviewed by me showed no significant ventricular or atrial dysrhythmias.    Labs: Cr 0.96, K+ 3.8      Assessment and plans    1.  Paroxysmal atrial flutter/fibrillation likely precipitated by hypoxemia and acute stress from respiratory distress.  Now on oral amiodarone loading, plan on short term use during her current episode of respiratory failure. No significant recurrence last 24 hours.      2. S/p OOH arrest, PEA by report more likely respiratory than cardiac  Reported VT by EMS during resuscitation. No documentation  No recurrence since admission     3. COPD/RLL  infiltrates/respirator failure  ECHO 10/29: NL LVSF. No mentioned of RVSP  Elevated BNP probably RV strain +/- some degree of DHF from obesity  BP borderline. Will try reducing furosemide to 20 mg QD     4. Hypokalemia resolved  Reduce K+ supplement with lower dose furosemide    Will see Sunday    Please note that this dictation was created using voice recognition software. I have worked with consultants from the vendor as well as technical experts from [employer] to optimize the interface. I have made every reasonable attempt to correct obvious errors, but I expect that there are errors of grammar and possibly content I did not discover before finalizing the note

## 2019-11-01 NOTE — CARE PLAN
Problem: Respiratory:  Goal: Respiratory status will improve  Note:   High Flow as needed throughout NOC shift     Problem: Mobility  Goal: Risk for activity intolerance will decrease  Note:   EOB, tolerated well

## 2019-11-01 NOTE — CARE PLAN
Problem: Mobility  Goal: Risk for activity intolerance will decrease  Outcome: PROGRESSING AS EXPECTED  Note:   Attempts today at sitting edge of the bed and transfer to cardiac chair     Problem: Respiratory:  Goal: Respiratory status will improve  Outcome: PROGRESSING SLOWER THAN EXPECTED  Note:   Pt on HFNC, 80%fiO2, O2 sat 90%, wean as tolerated     Problem: Pain Management  Goal: Pain level will decrease to patient's comfort goal  Outcome: MET  Note:   Q4hr pain assessment and prn, verbal numeric pain scale
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
## 2019-11-02 ENCOUNTER — APPOINTMENT (OUTPATIENT)
Dept: RADIOLOGY | Facility: MEDICAL CENTER | Age: 69
DRG: 208 | End: 2019-11-02
Attending: INTERNAL MEDICINE
Payer: MEDICARE

## 2019-11-02 PROBLEM — S22.43XA CLOSED FRACTURE OF MULTIPLE RIBS OF BOTH SIDES: Status: ACTIVE | Noted: 2019-11-02

## 2019-11-02 PROBLEM — N63.20 LEFT BREAST MASS: Status: ACTIVE | Noted: 2019-11-02

## 2019-11-02 PROBLEM — J98.11 COLLAPSE OF RIGHT LUNG: Status: ACTIVE | Noted: 2019-11-01

## 2019-11-02 LAB
ALBUMIN SERPL BCP-MCNC: 3.1 G/DL (ref 3.2–4.9)
ALBUMIN/GLOB SERPL: 1.2 G/DL
ALP SERPL-CCNC: 132 U/L (ref 30–99)
ALT SERPL-CCNC: 25 U/L (ref 2–50)
ANION GAP SERPL CALC-SCNC: 4 MMOL/L (ref 0–11.9)
AST SERPL-CCNC: 19 U/L (ref 12–45)
BASOPHILS # BLD AUTO: 0 % (ref 0–1.8)
BASOPHILS # BLD: 0 K/UL (ref 0–0.12)
BILIRUB SERPL-MCNC: 0.9 MG/DL (ref 0.1–1.5)
BUN SERPL-MCNC: 21 MG/DL (ref 8–22)
CALCIUM SERPL-MCNC: 8.6 MG/DL (ref 8.5–10.5)
CHLORIDE SERPL-SCNC: 97 MMOL/L (ref 96–112)
CO2 SERPL-SCNC: 37 MMOL/L (ref 20–33)
CREAT SERPL-MCNC: 1 MG/DL (ref 0.5–1.4)
EKG IMPRESSION: NORMAL
EOSINOPHIL # BLD AUTO: 0.03 K/UL (ref 0–0.51)
EOSINOPHIL NFR BLD: 0.4 % (ref 0–6.9)
ERYTHROCYTE [DISTWIDTH] IN BLOOD BY AUTOMATED COUNT: 50.8 FL (ref 35.9–50)
GLOBULIN SER CALC-MCNC: 2.5 G/DL (ref 1.9–3.5)
GLUCOSE SERPL-MCNC: 83 MG/DL (ref 65–99)
HCT VFR BLD AUTO: 41.1 % (ref 37–47)
HGB BLD-MCNC: 12.9 G/DL (ref 12–16)
IMM GRANULOCYTES # BLD AUTO: 0.04 K/UL (ref 0–0.11)
IMM GRANULOCYTES NFR BLD AUTO: 0.6 % (ref 0–0.9)
LYMPHOCYTES # BLD AUTO: 0.63 K/UL (ref 1–4.8)
LYMPHOCYTES NFR BLD: 9.2 % (ref 22–41)
MAGNESIUM SERPL-MCNC: 2 MG/DL (ref 1.5–2.5)
MCH RBC QN AUTO: 32.1 PG (ref 27–33)
MCHC RBC AUTO-ENTMCNC: 31.4 G/DL (ref 33.6–35)
MCV RBC AUTO: 102.2 FL (ref 81.4–97.8)
MONOCYTES # BLD AUTO: 0.46 K/UL (ref 0–0.85)
MONOCYTES NFR BLD AUTO: 6.7 % (ref 0–13.4)
NEUTROPHILS # BLD AUTO: 5.69 K/UL (ref 2–7.15)
NEUTROPHILS NFR BLD: 83.1 % (ref 44–72)
NRBC # BLD AUTO: 0 K/UL
NRBC BLD-RTO: 0 /100 WBC
NT-PROBNP SERPL IA-MCNC: 154 PG/ML (ref 0–125)
PHOSPHATE SERPL-MCNC: 2.4 MG/DL (ref 2.5–4.5)
PLATELET # BLD AUTO: 125 K/UL (ref 164–446)
PMV BLD AUTO: 10.6 FL (ref 9–12.9)
POTASSIUM SERPL-SCNC: 4.2 MMOL/L (ref 3.6–5.5)
PROT SERPL-MCNC: 5.6 G/DL (ref 6–8.2)
RBC # BLD AUTO: 4.02 M/UL (ref 4.2–5.4)
SODIUM SERPL-SCNC: 138 MMOL/L (ref 135–145)
WBC # BLD AUTO: 6.9 K/UL (ref 4.8–10.8)

## 2019-11-02 PROCEDURE — 93010 ELECTROCARDIOGRAM REPORT: CPT | Performed by: INTERNAL MEDICINE

## 2019-11-02 PROCEDURE — 770022 HCHG ROOM/CARE - ICU (200)

## 2019-11-02 PROCEDURE — 700101 HCHG RX REV CODE 250: Performed by: INTERNAL MEDICINE

## 2019-11-02 PROCEDURE — A9270 NON-COVERED ITEM OR SERVICE: HCPCS | Performed by: INTERNAL MEDICINE

## 2019-11-02 PROCEDURE — 94640 AIRWAY INHALATION TREATMENT: CPT

## 2019-11-02 PROCEDURE — 93005 ELECTROCARDIOGRAM TRACING: CPT | Performed by: INTERNAL MEDICINE

## 2019-11-02 PROCEDURE — 80053 COMPREHEN METABOLIC PANEL: CPT

## 2019-11-02 PROCEDURE — 99233 SBSQ HOSP IP/OBS HIGH 50: CPT | Performed by: HOSPITALIST

## 2019-11-02 PROCEDURE — 700105 HCHG RX REV CODE 258: Performed by: INTERNAL MEDICINE

## 2019-11-02 PROCEDURE — 94669 MECHANICAL CHEST WALL OSCILL: CPT

## 2019-11-02 PROCEDURE — 700111 HCHG RX REV CODE 636 W/ 250 OVERRIDE (IP): Performed by: INTERNAL MEDICINE

## 2019-11-02 PROCEDURE — A9270 NON-COVERED ITEM OR SERVICE: HCPCS | Performed by: HOSPITALIST

## 2019-11-02 PROCEDURE — 94667 MNPJ CHEST WALL 1ST: CPT

## 2019-11-02 PROCEDURE — 99291 CRITICAL CARE FIRST HOUR: CPT | Performed by: INTERNAL MEDICINE

## 2019-11-02 PROCEDURE — 71045 X-RAY EXAM CHEST 1 VIEW: CPT

## 2019-11-02 PROCEDURE — 99292 CRITICAL CARE ADDL 30 MIN: CPT | Performed by: INTERNAL MEDICINE

## 2019-11-02 PROCEDURE — 700102 HCHG RX REV CODE 250 W/ 637 OVERRIDE(OP): Performed by: INTERNAL MEDICINE

## 2019-11-02 PROCEDURE — 85025 COMPLETE CBC W/AUTO DIFF WBC: CPT

## 2019-11-02 PROCEDURE — 83735 ASSAY OF MAGNESIUM: CPT

## 2019-11-02 PROCEDURE — 94668 MNPJ CHEST WALL SBSQ: CPT

## 2019-11-02 PROCEDURE — 83880 ASSAY OF NATRIURETIC PEPTIDE: CPT

## 2019-11-02 PROCEDURE — 84100 ASSAY OF PHOSPHORUS: CPT

## 2019-11-02 PROCEDURE — 700102 HCHG RX REV CODE 250 W/ 637 OVERRIDE(OP): Performed by: HOSPITALIST

## 2019-11-02 RX ORDER — PREDNISONE 20 MG/1
40 TABLET ORAL DAILY
Status: COMPLETED | OUTPATIENT
Start: 2019-11-02 | End: 2019-11-02

## 2019-11-02 RX ORDER — PREDNISONE 10 MG/1
10 TABLET ORAL DAILY
Status: COMPLETED | OUTPATIENT
Start: 2019-11-09 | End: 2019-11-11

## 2019-11-02 RX ORDER — PREDNISONE 20 MG/1
20 TABLET ORAL DAILY
Status: COMPLETED | OUTPATIENT
Start: 2019-11-06 | End: 2019-11-08

## 2019-11-02 RX ORDER — PREDNISONE 20 MG/1
40 TABLET ORAL DAILY
Status: DISCONTINUED | OUTPATIENT
Start: 2019-11-02 | End: 2019-11-02

## 2019-11-02 RX ADMIN — IPRATROPIUM BROMIDE AND ALBUTEROL SULFATE 3 ML: .5; 3 SOLUTION RESPIRATORY (INHALATION) at 19:45

## 2019-11-02 RX ADMIN — METOPROLOL TARTRATE 25 MG: 25 TABLET, FILM COATED ORAL at 05:08

## 2019-11-02 RX ADMIN — AMIODARONE HYDROCHLORIDE 400 MG: 200 TABLET ORAL at 17:42

## 2019-11-02 RX ADMIN — RIVAROXABAN 20 MG: 20 TABLET, FILM COATED ORAL at 17:44

## 2019-11-02 RX ADMIN — NYSTATIN: 100000 POWDER TOPICAL at 17:43

## 2019-11-02 RX ADMIN — PREDNISONE 40 MG: 20 TABLET ORAL at 05:07

## 2019-11-02 RX ADMIN — NYSTATIN: 100000 POWDER TOPICAL at 05:07

## 2019-11-02 RX ADMIN — FUROSEMIDE 20 MG: 10 INJECTION, SOLUTION INTRAMUSCULAR; INTRAVENOUS at 05:07

## 2019-11-02 RX ADMIN — POTASSIUM CHLORIDE 20 MEQ: 20 TABLET, EXTENDED RELEASE ORAL at 17:43

## 2019-11-02 RX ADMIN — METOPROLOL TARTRATE 25 MG: 25 TABLET, FILM COATED ORAL at 17:42

## 2019-11-02 RX ADMIN — POTASSIUM CHLORIDE 20 MEQ: 20 TABLET, EXTENDED RELEASE ORAL at 05:07

## 2019-11-02 RX ADMIN — ACETAMINOPHEN 650 MG: 325 TABLET, FILM COATED ORAL at 00:50

## 2019-11-02 RX ADMIN — ENOXAPARIN SODIUM 120 MG: 150 INJECTION SUBCUTANEOUS at 05:06

## 2019-11-02 RX ADMIN — IPRATROPIUM BROMIDE AND ALBUTEROL SULFATE 3 ML: .5; 3 SOLUTION RESPIRATORY (INHALATION) at 11:24

## 2019-11-02 RX ADMIN — SENNOSIDES AND DOCUSATE SODIUM 2 TABLET: 8.6; 5 TABLET ORAL at 05:07

## 2019-11-02 RX ADMIN — PREDNISONE 40 MG: 20 TABLET ORAL at 17:43

## 2019-11-02 RX ADMIN — IPRATROPIUM BROMIDE AND ALBUTEROL SULFATE 3 ML: .5; 3 SOLUTION RESPIRATORY (INHALATION) at 15:02

## 2019-11-02 RX ADMIN — AMIODARONE HYDROCHLORIDE 400 MG: 200 TABLET ORAL at 05:07

## 2019-11-02 RX ADMIN — LEVOTHYROXINE SODIUM 175 MCG: 125 TABLET ORAL at 05:08

## 2019-11-02 RX ADMIN — SODIUM PHOSPHATE, MONOBASIC, MONOHYDRATE AND SODIUM PHOSPHATE, DIBASIC, ANHYDROUS 15 MMOL: 276; 142 INJECTION, SOLUTION INTRAVENOUS at 09:15

## 2019-11-02 RX ADMIN — IPRATROPIUM BROMIDE AND ALBUTEROL SULFATE 3 ML: .5; 3 SOLUTION RESPIRATORY (INHALATION) at 07:07

## 2019-11-02 ASSESSMENT — ENCOUNTER SYMPTOMS
SPUTUM PRODUCTION: 1
PHOTOPHOBIA: 0
FEVER: 0
DIZZINESS: 0
SPUTUM PRODUCTION: 0
DOUBLE VISION: 0
COUGH: 1
PALPITATIONS: 0
HEARTBURN: 0
NERVOUS/ANXIOUS: 0
SPEECH CHANGE: 0
EYES NEGATIVE: 1
HEMOPTYSIS: 0
TINGLING: 0
VOMITING: 0
WHEEZING: 0
BRUISES/BLEEDS EASILY: 0
BACK PAIN: 1
WEAKNESS: 0
BACK PAIN: 0
MYALGIAS: 1
NAUSEA: 0
HEADACHES: 0
MYALGIAS: 0
BLURRED VISION: 0
DEPRESSION: 0
DIAPHORESIS: 0
NEUROLOGICAL NEGATIVE: 1
SINUS PAIN: 0
NECK PAIN: 0
FOCAL WEAKNESS: 0
STRIDOR: 0
POLYDIPSIA: 0
CHILLS: 0
WEIGHT LOSS: 0
SENSORY CHANGE: 0
BLOOD IN STOOL: 0
SHORTNESS OF BREATH: 1
NERVOUS/ANXIOUS: 1
GASTROINTESTINAL NEGATIVE: 1

## 2019-11-02 ASSESSMENT — PATIENT HEALTH QUESTIONNAIRE - PHQ9
1. LITTLE INTEREST OR PLEASURE IN DOING THINGS: NOT AT ALL
2. FEELING DOWN, DEPRESSED, IRRITABLE, OR HOPELESS: NOT AT ALL
SUM OF ALL RESPONSES TO PHQ9 QUESTIONS 1 AND 2: 0

## 2019-11-02 ASSESSMENT — CHA2DS2 SCORE
DIABETES: NO
CHF OR LEFT VENTRICULAR DYSFUNCTION: NO
VASCULAR DISEASE: NO
PRIOR STROKE OR TIA OR THROMBOEMBOLISM: NO
SEX: FEMALE
HYPERTENSION: YES
AGE 75 OR GREATER: NO
AGE 65 TO 74: YES
CHA2DS2 VASC SCORE: 3

## 2019-11-02 NOTE — PROGRESS NOTES
Patient complaining of chest pain, Dr Ignacio called and is aware, EKG ordered and done. NSR, No new changes. Will continue to monitor.

## 2019-11-02 NOTE — CARE PLAN
Problem: Bowel/Gastric:  Goal: Will not experience complications related to bowel motility  Outcome: PROGRESSING AS EXPECTED     Problem: Knowledge Deficit  Goal: Knowledge of disease process/condition, treatment plan, diagnostic tests, and medications will improve  Outcome: PROGRESSING AS EXPECTED  Goal: Knowledge of the prescribed therapeutic regimen will improve  Outcome: PROGRESSING AS EXPECTED

## 2019-11-02 NOTE — ASSESSMENT & PLAN NOTE
Reviewed ct with radiology  Incidentally left breast mass per radiologist, report to be updated  Can get us with aspiration as appearance seems to be cystic in nature  Can be done outpt   patient aware
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
## 2019-11-03 PROBLEM — I27.20 PULMONARY HYPERTENSION (HCC): Status: ACTIVE | Noted: 2019-11-03

## 2019-11-03 PROBLEM — J44.1 CHRONIC OBSTRUCTIVE PULMONARY DISEASE WITH ACUTE EXACERBATION (HCC): Status: ACTIVE | Noted: 2019-11-03

## 2019-11-03 LAB
ALBUMIN SERPL BCP-MCNC: 3.4 G/DL (ref 3.2–4.9)
ALBUMIN/GLOB SERPL: 1.2 G/DL
ALP SERPL-CCNC: 130 U/L (ref 30–99)
ALT SERPL-CCNC: 22 U/L (ref 2–50)
ANION GAP SERPL CALC-SCNC: 4 MMOL/L (ref 0–11.9)
AST SERPL-CCNC: 14 U/L (ref 12–45)
BASOPHILS # BLD AUTO: 0.1 % (ref 0–1.8)
BASOPHILS # BLD: 0.01 K/UL (ref 0–0.12)
BILIRUB SERPL-MCNC: 0.9 MG/DL (ref 0.1–1.5)
BUN SERPL-MCNC: 20 MG/DL (ref 8–22)
CALCIUM SERPL-MCNC: 9 MG/DL (ref 8.5–10.5)
CHLORIDE SERPL-SCNC: 100 MMOL/L (ref 96–112)
CO2 SERPL-SCNC: 35 MMOL/L (ref 20–33)
CREAT SERPL-MCNC: 0.96 MG/DL (ref 0.5–1.4)
EOSINOPHIL # BLD AUTO: 0 K/UL (ref 0–0.51)
EOSINOPHIL NFR BLD: 0 % (ref 0–6.9)
ERYTHROCYTE [DISTWIDTH] IN BLOOD BY AUTOMATED COUNT: 49.4 FL (ref 35.9–50)
GLOBULIN SER CALC-MCNC: 2.9 G/DL (ref 1.9–3.5)
GLUCOSE SERPL-MCNC: 126 MG/DL (ref 65–99)
HCT VFR BLD AUTO: 42.3 % (ref 37–47)
HGB BLD-MCNC: 13.1 G/DL (ref 12–16)
IMM GRANULOCYTES # BLD AUTO: 0.06 K/UL (ref 0–0.11)
IMM GRANULOCYTES NFR BLD AUTO: 0.8 % (ref 0–0.9)
LYMPHOCYTES # BLD AUTO: 0.36 K/UL (ref 1–4.8)
LYMPHOCYTES NFR BLD: 4.8 % (ref 22–41)
MAGNESIUM SERPL-MCNC: 1.9 MG/DL (ref 1.5–2.5)
MCH RBC QN AUTO: 31.6 PG (ref 27–33)
MCHC RBC AUTO-ENTMCNC: 31 G/DL (ref 33.6–35)
MCV RBC AUTO: 102.2 FL (ref 81.4–97.8)
MONOCYTES # BLD AUTO: 0.33 K/UL (ref 0–0.85)
MONOCYTES NFR BLD AUTO: 4.4 % (ref 0–13.4)
NEUTROPHILS # BLD AUTO: 6.76 K/UL (ref 2–7.15)
NEUTROPHILS NFR BLD: 89.9 % (ref 44–72)
NRBC # BLD AUTO: 0 K/UL
NRBC BLD-RTO: 0 /100 WBC
PHOSPHATE SERPL-MCNC: 2.6 MG/DL (ref 2.5–4.5)
PLATELET # BLD AUTO: 134 K/UL (ref 164–446)
PMV BLD AUTO: 10.3 FL (ref 9–12.9)
POTASSIUM SERPL-SCNC: 4.4 MMOL/L (ref 3.6–5.5)
PROT SERPL-MCNC: 6.3 G/DL (ref 6–8.2)
RBC # BLD AUTO: 4.14 M/UL (ref 4.2–5.4)
SODIUM SERPL-SCNC: 139 MMOL/L (ref 135–145)
WBC # BLD AUTO: 7.5 K/UL (ref 4.8–10.8)

## 2019-11-03 PROCEDURE — A9270 NON-COVERED ITEM OR SERVICE: HCPCS | Performed by: INTERNAL MEDICINE

## 2019-11-03 PROCEDURE — 99232 SBSQ HOSP IP/OBS MODERATE 35: CPT | Performed by: INTERNAL MEDICINE

## 2019-11-03 PROCEDURE — 700102 HCHG RX REV CODE 250 W/ 637 OVERRIDE(OP): Performed by: INTERNAL MEDICINE

## 2019-11-03 PROCEDURE — 700101 HCHG RX REV CODE 250: Performed by: HOSPITALIST

## 2019-11-03 PROCEDURE — 99233 SBSQ HOSP IP/OBS HIGH 50: CPT | Performed by: HOSPITALIST

## 2019-11-03 PROCEDURE — 94640 AIRWAY INHALATION TREATMENT: CPT

## 2019-11-03 PROCEDURE — 85025 COMPLETE CBC W/AUTO DIFF WBC: CPT

## 2019-11-03 PROCEDURE — 80053 COMPREHEN METABOLIC PANEL: CPT

## 2019-11-03 PROCEDURE — 99233 SBSQ HOSP IP/OBS HIGH 50: CPT | Performed by: INTERNAL MEDICINE

## 2019-11-03 PROCEDURE — 700102 HCHG RX REV CODE 250 W/ 637 OVERRIDE(OP): Performed by: HOSPITALIST

## 2019-11-03 PROCEDURE — 700101 HCHG RX REV CODE 250: Performed by: INTERNAL MEDICINE

## 2019-11-03 PROCEDURE — 97535 SELF CARE MNGMENT TRAINING: CPT

## 2019-11-03 PROCEDURE — 700111 HCHG RX REV CODE 636 W/ 250 OVERRIDE (IP): Performed by: INTERNAL MEDICINE

## 2019-11-03 PROCEDURE — 84100 ASSAY OF PHOSPHORUS: CPT

## 2019-11-03 PROCEDURE — 94668 MNPJ CHEST WALL SBSQ: CPT

## 2019-11-03 PROCEDURE — A9270 NON-COVERED ITEM OR SERVICE: HCPCS | Performed by: HOSPITALIST

## 2019-11-03 PROCEDURE — 83735 ASSAY OF MAGNESIUM: CPT

## 2019-11-03 PROCEDURE — 770020 HCHG ROOM/CARE - TELE (206)

## 2019-11-03 RX ORDER — BUDESONIDE AND FORMOTEROL FUMARATE DIHYDRATE 160; 4.5 UG/1; UG/1
2 AEROSOL RESPIRATORY (INHALATION)
Status: DISCONTINUED | OUTPATIENT
Start: 2019-11-03 | End: 2019-11-03

## 2019-11-03 RX ORDER — LIDOCAINE 50 MG/G
1 PATCH TOPICAL EVERY 24 HOURS
Status: DISCONTINUED | OUTPATIENT
Start: 2019-11-03 | End: 2019-11-21 | Stop reason: HOSPADM

## 2019-11-03 RX ORDER — BUDESONIDE AND FORMOTEROL FUMARATE DIHYDRATE 160; 4.5 UG/1; UG/1
2 AEROSOL RESPIRATORY (INHALATION) 2 TIMES DAILY
Status: DISCONTINUED | OUTPATIENT
Start: 2019-11-03 | End: 2019-11-06

## 2019-11-03 RX ORDER — MAGNESIUM SULFATE HEPTAHYDRATE 40 MG/ML
2 INJECTION, SOLUTION INTRAVENOUS ONCE
Status: COMPLETED | OUTPATIENT
Start: 2019-11-03 | End: 2019-11-03

## 2019-11-03 RX ORDER — GUAIFENESIN 600 MG/1
600 TABLET, EXTENDED RELEASE ORAL EVERY 12 HOURS
Status: DISCONTINUED | OUTPATIENT
Start: 2019-11-03 | End: 2019-11-21 | Stop reason: HOSPADM

## 2019-11-03 RX ADMIN — AMIODARONE HYDROCHLORIDE 400 MG: 200 TABLET ORAL at 05:08

## 2019-11-03 RX ADMIN — METOPROLOL TARTRATE 25 MG: 25 TABLET, FILM COATED ORAL at 18:11

## 2019-11-03 RX ADMIN — AMIODARONE HYDROCHLORIDE 400 MG: 200 TABLET ORAL at 18:12

## 2019-11-03 RX ADMIN — NYSTATIN: 100000 POWDER TOPICAL at 05:09

## 2019-11-03 RX ADMIN — IPRATROPIUM BROMIDE AND ALBUTEROL SULFATE 3 ML: .5; 3 SOLUTION RESPIRATORY (INHALATION) at 18:00

## 2019-11-03 RX ADMIN — POTASSIUM CHLORIDE 20 MEQ: 20 TABLET, EXTENDED RELEASE ORAL at 18:09

## 2019-11-03 RX ADMIN — BUDESONIDE AND FORMOTEROL FUMARATE DIHYDRATE 2 PUFF: 160; 4.5 AEROSOL RESPIRATORY (INHALATION) at 11:38

## 2019-11-03 RX ADMIN — SENNOSIDES AND DOCUSATE SODIUM 2 TABLET: 8.6; 5 TABLET ORAL at 18:10

## 2019-11-03 RX ADMIN — RIVAROXABAN 20 MG: 20 TABLET, FILM COATED ORAL at 18:08

## 2019-11-03 RX ADMIN — POTASSIUM CHLORIDE 20 MEQ: 20 TABLET, EXTENDED RELEASE ORAL at 05:09

## 2019-11-03 RX ADMIN — GUAIFENESIN 600 MG: 600 TABLET, EXTENDED RELEASE ORAL at 18:08

## 2019-11-03 RX ADMIN — IPRATROPIUM BROMIDE AND ALBUTEROL SULFATE 3 ML: .5; 3 SOLUTION RESPIRATORY (INHALATION) at 11:35

## 2019-11-03 RX ADMIN — BUDESONIDE AND FORMOTEROL FUMARATE DIHYDRATE 2 PUFF: 160; 4.5 AEROSOL RESPIRATORY (INHALATION) at 18:13

## 2019-11-03 RX ADMIN — IPRATROPIUM BROMIDE AND ALBUTEROL SULFATE 3 ML: .5; 3 SOLUTION RESPIRATORY (INHALATION) at 07:55

## 2019-11-03 RX ADMIN — FUROSEMIDE 20 MG: 10 INJECTION, SOLUTION INTRAMUSCULAR; INTRAVENOUS at 05:08

## 2019-11-03 RX ADMIN — PREDNISONE 30 MG: 20 TABLET ORAL at 05:08

## 2019-11-03 RX ADMIN — METOPROLOL TARTRATE 25 MG: 25 TABLET, FILM COATED ORAL at 05:09

## 2019-11-03 RX ADMIN — MAGNESIUM SULFATE 2 G: 2 INJECTION INTRAVENOUS at 07:44

## 2019-11-03 RX ADMIN — LIDOCAINE 1 PATCH: 50 PATCH TOPICAL at 15:03

## 2019-11-03 RX ADMIN — LEVOTHYROXINE SODIUM 175 MCG: 125 TABLET ORAL at 05:08

## 2019-11-03 RX ADMIN — NYSTATIN: 100000 POWDER TOPICAL at 18:14

## 2019-11-03 ASSESSMENT — ENCOUNTER SYMPTOMS
NEUROLOGICAL NEGATIVE: 1
POLYDIPSIA: 0
FOCAL WEAKNESS: 0
VOMITING: 0
DIAPHORESIS: 0
STRIDOR: 0
DOUBLE VISION: 0
PHOTOPHOBIA: 0
HEADACHES: 0
SPUTUM PRODUCTION: 1
PALPITATIONS: 0
GASTROINTESTINAL NEGATIVE: 1
BACK PAIN: 1
MYALGIAS: 0
MYALGIAS: 1
WEIGHT LOSS: 0
WHEEZING: 0
SENSORY CHANGE: 0
FEVER: 0
NAUSEA: 0
COUGH: 1
SINUS PAIN: 0
CHILLS: 0
NERVOUS/ANXIOUS: 0
HEMOPTYSIS: 0
NECK PAIN: 0
BRUISES/BLEEDS EASILY: 0
EYES NEGATIVE: 1
SHORTNESS OF BREATH: 1
DEPRESSION: 0
SPEECH CHANGE: 0
SPUTUM PRODUCTION: 0
BLURRED VISION: 0
BLOOD IN STOOL: 0
HEARTBURN: 0
TINGLING: 0
BACK PAIN: 0
DIZZINESS: 0
WEAKNESS: 0
NERVOUS/ANXIOUS: 1

## 2019-11-03 ASSESSMENT — LIFESTYLE VARIABLES
CONSUMPTION TOTAL: NEGATIVE
HAVE PEOPLE ANNOYED YOU BY CRITICIZING YOUR DRINKING: NO
ON A TYPICAL DAY WHEN YOU DRINK ALCOHOL HOW MANY DRINKS DO YOU HAVE: 0
TOTAL SCORE: 0
HAVE YOU EVER FELT YOU SHOULD CUT DOWN ON YOUR DRINKING: NO
EVER HAD A DRINK FIRST THING IN THE MORNING TO STEADY YOUR NERVES TO GET RID OF A HANGOVER: NO
AVERAGE NUMBER OF DAYS PER WEEK YOU HAVE A DRINK CONTAINING ALCOHOL: 0
EVER FELT BAD OR GUILTY ABOUT YOUR DRINKING: NO
TOTAL SCORE: 0
ALCOHOL_USE: NO
TOTAL SCORE: 0
HOW MANY TIMES IN THE PAST YEAR HAVE YOU HAD 5 OR MORE DRINKS IN A DAY: 0

## 2019-11-03 ASSESSMENT — COGNITIVE AND FUNCTIONAL STATUS - GENERAL
SUGGESTED CMS G CODE MODIFIER DAILY ACTIVITY: CL
DAILY ACTIVITIY SCORE: 11
HELP NEEDED FOR BATHING: TOTAL
DRESSING REGULAR UPPER BODY CLOTHING: A LOT
DRESSING REGULAR LOWER BODY CLOTHING: TOTAL
PERSONAL GROOMING: A LITTLE
TOILETING: TOTAL
EATING MEALS: A LITTLE

## 2019-11-03 NOTE — ASSESSMENT & PLAN NOTE
Exacerbated by respiratory failure due to hypoxia  No prior significant exacerbation to hospitalization  Likely complicated with pea arrest from having oxygen off at home  Steroids  Added symbicort bid  Steroid taper  Copd education ordered

## 2019-11-03 NOTE — THERAPY
"Occupational Therapy Treatment completed with focus on ADLs, ADL transfers and patient education.  Plan of Care: Will benefit from Occupational Therapy 3 times per week  Discharge Recommendations:  Equipment unknown at this time Recommend post-acute placement for additional occupational therapy services prior to discharge home. Patient can tolerate post-acute therapies at a 5x/week frequency.. Post-acute therapy     Patient seen for OT treat focused on Mod A x 2 with FWW SPT and total A with FWW standing zara care hygiene. Patient would benefit from skilled OT in this setting followed by post acute placement.     See \"Rehab Therapy-Acute\" Patient Summary Report for complete documentation.   "

## 2019-11-03 NOTE — PROGRESS NOTES
[facility] Medicine Daily Progress Note    Date of Service  11/3/2019    Chief Complaint  69 y.o. female admitted 10/28/2019 with COPD chronically on 2 L, hypothyroidism, morbid obesity, [facility] with the patient apparently was found without oxygen secondary to power outage.  Found unconscious, brought to [facility] ER with the patient had a significant respiratory acidosis with a pH of 7.0, following the patient had a PEA cardiac arrest, admitted hypotensive, intubated, placed on amiodarone for dysrhythmia, no additional evidence of pulmonary embolism  Cardiology consulted, question of LOWELL marinelli, cardiac arrest likely secondary to hypoxia and acidosis  Hospital Course    Admitted from [facility] with respiratory arrest, cardiac arrest      Interval Problem Update  Patient seen and examined today. ICU Care  Care and plan discussed in IDT/Hot rounds.  Lines and assistive devices reviewed.    Patient tolerating treatment and therapies.  All Data, Medication data reviewed.  Case discussed with nursing as available.  Plan of Care reviewed with patient and notified of changes.  10/30 the patient is improving on mechanical ventilation, alert and following, later extubated, blood pressure sufficient, afebrile, after extubation patient is somewhat lethargic, complaining of body aches, states she is usually on 3 L oxygen at home.  Some cough, no excessive sputum production.  10/31 patient improving, remains on oxygen therapy at 15 L, T-max 99, sinus rhythm, sitting up in chair, feels sore overall, tolerating diuresis, high flow nasal cannula delivery system PRN, poor p.o. Intake.  11/1 the patient placed on high flow nasal cannula overnight at 4 L, 70%, diuresing, replacing electrolytes, remains lethargic and complains of being sore, CBC without major change, slightly increased platelets, denies palpitations, brief episode of atrial fibrillar yesterday, on oral amiodarone,  cardiology following  11/2 patient feels better, less achy, titrating off high flow nasal cannula, to keep saturation above 86, afebrile, complaining still of losing her lower dentures, difficulty with mastication, per radiology the patient with right lung collapse on the lower portion pulling her heart to the right, no true dextrocardia.  Titrating prednisone.,  Found to have a left breast cystic structure, unclear of etiology.  11/3 patient slightly better, still chest wall pain, discussed issue of left breast lesion, improved respiratory status, positive cough with phlegm production, overall stabilization and ability to downgrade.  Laboratory data improved  Consultants/Specialty  Pulmonary critical care  Cardiology    Code Status  Full code    Disposition  To telemetry    Review of Systems  Review of Systems   Constitutional: Positive for malaise/fatigue. Negative for chills and fever.   HENT: Negative.    Eyes: Negative.    Respiratory: Positive for cough, sputum production and shortness of breath.    Cardiovascular: Positive for chest pain. Negative for palpitations.   Gastrointestinal: Negative.  Negative for heartburn, nausea and vomiting.   Genitourinary: Negative.  Negative for dysuria and frequency.   Musculoskeletal: Positive for back pain and myalgias. Negative for neck pain.   Skin: Negative.  Negative for itching and rash.   Neurological: Negative.  Negative for dizziness, focal weakness, weakness and headaches.   Endo/Heme/Allergies: Negative.  Negative for polydipsia. Does not bruise/bleed easily.   Psychiatric/Behavioral: Negative for depression. The patient is nervous/anxious.         Physical Exam  Temp:  [37.1 °C (98.8 °F)-37.2 °C (99 °F)] 37.2 °C (99 °F)  Pulse:  [63-85] 71  Resp:  [14-26] 19  BP: ()/(64-77) 99/67  SpO2:  [86 %-94 %] 93 %    Physical Exam   Constitutional: She is oriented to person, place, and time. She appears well-developed and well-nourished. She appears lethargic. She  has a sickly appearance. Nasal cannula in place.   HENT:   Head: Normocephalic and atraumatic.   Nose: Nose normal.   Mouth/Throat: Oropharynx is clear and moist.   Eyes: Pupils are equal, round, and reactive to light. Conjunctivae and EOM are normal.   Neck: Normal range of motion. Neck supple. No JVD present. No thyromegaly present.   Cardiovascular: Normal rate, regular rhythm, normal heart sounds and intact distal pulses. Exam reveals no gallop and no friction rub.   Capillary refill <3 secs   Pulmonary/Chest: Effort normal. She has wheezes. She has rhonchi. She has rales.   Abdominal: Soft. Bowel sounds are normal. She exhibits no distension and no mass. There is no tenderness. There is no rebound and no guarding.   Musculoskeletal: Normal range of motion.         General: No tenderness or edema.   Lymphadenopathy:     She has no cervical adenopathy.   Neurological: She is oriented to person, place, and time. She appears lethargic. She displays normal reflexes. No cranial nerve deficit. Coordination normal.   Skin: Skin is warm and dry. She is not diaphoretic. No cyanosis.   Nursing note and vitals reviewed.      Fluids    Intake/Output Summary (Last 24 hours) at 11/3/2019 0659  Last data filed at 11/3/2019 0600  Gross per 24 hour   Intake 950 ml   Output 2345 ml   Net -1395 ml       Laboratory  Recent Labs     11/01/19 0325 11/02/19  0400 11/03/19  0414   WBC 6.7 6.9 7.5   RBC 4.22 4.02* 4.14*   HEMOGLOBIN 13.3 12.9 13.1   HEMATOCRIT 43.4 41.1 42.3   .8* 102.2* 102.2*   MCH 31.5 32.1 31.6   MCHC 30.6* 31.4* 31.0*   RDW 51.6* 50.8* 49.4   PLATELETCT 120* 125* 134*   MPV 10.4 10.6 10.3     Recent Labs     11/01/19 0325 11/02/19  0400 11/03/19  0414   SODIUM 141 138 139   POTASSIUM 3.8 4.2 4.4   CHLORIDE 99 97 100   CO2 36* 37* 35*   GLUCOSE 93 83 126*   BUN 23* 21 20   CREATININE 0.96 1.00 0.96   CALCIUM 8.3* 8.6 9.0                   Imaging  DX-CHEST-PORTABLE (1 VIEW)   Final Result      1.  Possible  improved consolidation in the right lung versus related to differences in rotation.   2.  Improved interstitial opacity/edema.   3.  Left basilar atelectasis.      DX-CHEST-PORTABLE (1 VIEW)   Final Result      1.  Removal of endotracheal tube and enteric catheters      2.  No other change      DX-CHEST-PORTABLE (1 VIEW)   Final Result      Improved left upper lobe atelectasis and consolidation      Otherwise stable right worse than left consolidation and atelectasis with probable right pleural fluid      Bilateral acute rib fractures      EC-ECHOCARDIOGRAM COMPLETE W/O CONT   Final Result      DX-ABDOMEN FOR TUBE PLACEMENT   Final Result      Feeding tube in place as noted above.      DX-CHEST-PORTABLE (1 VIEW)   Final Result      Improving right upper lobe atelectasis. No significant change otherwise.      DX-ABDOMEN FOR TUBE PLACEMENT   Final Result      Nasogastric tube in place as noted above.      CT-CTA CHEST PULMONARY ARTERY W/ RECONS   Final Result      1.  No evidence of pulmonary emboli   2.  Extensive airspace opacities and atelectasis with marked volume loss in the right lung. A small right pleural effusion is also present, along with a tiny right pneumothorax.   3.  Patchy airspace opacities in the left upper lobe as well as left lower lobe atelectasis with tiny left pleural effusion.            DX-CHEST-PORTABLE (1 VIEW)   Final Result      Diffuse bilateral interstitial opacities, suggesting pulmonary edema. Additionally there are confluent and linear opacities throughout the right perihilar and upper lung field and in the left base, consistent with a combination of pneumonia and    atelectasis. All of this is considerably worse compared with the prior image.      US-BREAST LIMITED-LEFT    (Results Pending)        Assessment/Plan  * Respiratory arrest (HCC)- (present on admission)  Assessment & Plan  Secondary to profound hypoxia, respiratory compromise  Negative for pulmonary embolism  Chronically  oxygen dependent  Pulmonary critical care following    Atrial flutter (HCC)  Assessment & Plan  Brief episode, continue with amiodarone, PRN Lopressor, anticoagulation with Xarelto    Ventricular tachycardia (HCC)- (present on admission)  Assessment & Plan  Initially on amiodarone, cardiology following, antiarrhythmic therapy discontinued, monitor  Echocardiogram noted with good EF      Left breast mass  Assessment & Plan  Incidental finding on CT  Discuss aspiration/biopsy      Closed fracture of multiple ribs of both sides  Assessment & Plan  Acute and previously healed, pain control    Collapse of right lung  Assessment & Plan  Appears to be related to prior injury/trauma    Hypomagnesemia- (present on admission)  Assessment & Plan  Replace and recheck labs    Encephalopathy- (present on admission)  Assessment & Plan  Improving, no definitive anoxic brain injury  Follow clinically  Monitor      Hypotension- (present on admission)  Assessment & Plan  Has resolved, monitor    Elevated troponin- (present on admission)  Assessment & Plan  Due to cardiac arrest  Telemetry monitoring  Cardiology following      Thrombocytopenia (HCC)- (present on admission)  Assessment & Plan  Mild, cont to monitor    Morbid obesity (HCC)- (present on admission)  Assessment & Plan  Encourage weight loss when appropriate    Acute on chronic respiratory failure with hypoxia and hypercapnia (HCC)  Assessment & Plan  Due to significant hypercarbia, found to be hypoxic, acidemic, apparently without oxygen in place  Subsequently intbuated 10/28/2019, extubated 10/30  Continue respiratory protocol  Wean oxygen as tolerated  Pulmonary toilet and continue bronchodilators    Plan  Wean oxygen as tolerated, goal saturation 86% or higher  Continue treatment for COPD, pulmonary toilet, Aerobika, incentive spirometry  Steroids to taper  Broncho-dilators to continue  Pulmonary toilet to continue  Continue with diuresis as tolerated  Mobilize as  tolerated  Breast biopsy likely Monday, will discuss with IR  [facility]  Balance electrolytes  See orders  Medically complex high risk  Patient overall stabilized for transfer to medical unit/telemetry unit with close respiratory therapy follow-up  VTE prophylaxis: Heparin    I have performed a physical exam and reviewed and updated ROS and Plan today . In review of yesterday's note , there are no changes except as documented above.
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
## 2019-11-03 NOTE — CARE PLAN
Problem: Psychosocial Needs:  Goal: Level of anxiety will decrease  Outcome: PROGRESSING AS EXPECTED     Problem: Respiratory:  Goal: Respiratory status will improve  Outcome: PROGRESSING SLOWER THAN EXPECTED     Problem: Mobility  Goal: Risk for activity intolerance will decrease  Outcome: PROGRESSING SLOWER THAN EXPECTED

## 2019-11-03 NOTE — PROGRESS NOTES
Reason for consultation /admission : PAF    Remains in SR on amiodarone  Some atypical CP yesterday AM but none since  EKG no ischemic change    O2 sat high 80 to low 90s on high flow O2  Diuresing well on low dose furosemide    K+ 4.4 this AM  NT-pro BNP down to 154 yesterday      Review of systems;    General: No fever, weak  Heart: No palpitation, no PND or orthopnea, less swelling  Lung: No productive cough, no hemoptysis  Abdomen: No abdominal pain, no nausea or blood in stool  : No hematuria  Musculoskeletal: + back pain, some joint pain  Hematology:  easy bruising  Skin: No rash or itching  Neurological: No headache, no new focal weakness or numbness  Psychological: Denies depression, anxiety or insomnia  All other review of systems are negative      Temp:  [37.1 °C (98.8 °F)-37.2 °C (99 °F)] 37.2 °C (99 °F)  Pulse:  [63-85] 69  Resp:  [14-26] 22  BP: (103-125)/(64-77) 113/71  SpO2:  [86 %-94 %] 93 %  ENT neck supple, no JVD, no carotid bruits or thyromegaly  Lung good expansion, distant sound, no rales or wheezing  Heart RRR, normal rate, no murmur,   no gallop or rub  Abd soft, no tenderness, mass or bruits  Ext trace edema  Skin + ecchymosis both feet  Musculoskeletal no deformity  Neuro grossly intact  Psych normal mood, normal affect    Monitor reviewed by me showed no significant ventricular or atrial dysrhythmias.    Labs: Cr 0.9, K+ 4.4    Assessment and plans    1.  Paroxysmal atrial flutter/fibrillation likely precipitated by hypoxemia and acute stress from respiratory distress.  Now on oral amiodarone loading, plan on short term use during her current episode of respiratory failure. No significant recurrence last 48 hours.      2. S/p OOH arrest, PEA by report more likely respiratory than cardiac  Reported VT by EMS during resuscitation. No documentation  No recurrence since admission  Consider MPI once respiratory status improved but lung disease is probably chronic and may not change  much     3. Atypical chest pain  Doubt ACS, observe for now    4. COPD/RLL infiltrates/respirator failure  ECHO 10/29: NL LVSF. No mentioned of RVSP  Improveing. Continue diuresis    Will see every few days    Please note that this dictation was created using voice recognition software. I have worked with consultants from the vendor as well as technical experts from [facility] to optimize the interface. I have made every reasonable attempt to correct obvious errors, but I expect that there are errors of grammar and possibly content I did not discover before finalizing the note

## 2019-11-03 NOTE — RESPIRATORY CARE
Respiratory Therapy Update    Interdisciplinary Plan of Care-Goals (Indications)  Bronchodilator Indications: History / Diagnosis (11/02/19 1502)  Bronchopulmonary Hygiene Indications: Difficulty with Secretion Clearance (11/02/19 1502)  Interdisciplinary Plan of Care-Outcomes   Bronchodilator Outcome: Improved Vital Signs and Measures of Gas Exchange (11/02/19 1502)  Bronchopulmonary Hygiene Outcome: Improvement in Vital Signs and Measures of Gas Exchange (11/02/19 1502)    #PEP/CPT (Manual) Initial: Initial (11/02/19 1125)     #SVN Performed: Yes (11/02/19 1945)    Cough: Congested;Productive;Strong (11/02/19 2000)  Sputum Amount: Small (11/02/19 2000)  Sputum Color: Yellow (11/02/19 2000)  Sputum Consistency: Thick (11/02/19 2000)    Heated Hi Flow Nasal Cannula (HHFNC): Yes (11/02/19 0709)  FiO2 (FNC): 60 (11/02/19 0709)  Flowrate (FNC): 40 (11/02/19 0709)    FiO2%: 60 % (11/02/19 0800)  O2 (LPM): 10 (11/02/19 2000)  O2 Daily Delivery Respiratory : OxyMask (11/02/19 1945)    Breath Sounds  Pre/Post Intervention: Pre Intervention Assessment (11/02/19 1945)  RUL Breath Sounds: Expiratory Wheezes (11/02/19 2000)  RML Breath Sounds: Diminished (11/02/19 2000)  RLL Breath Sounds: Diminished (11/02/19 2000)  MARIO Breath Sounds: Expiratory Wheezes (11/02/19 2000)  LLL Breath Sounds: Diminished (11/02/19 2000)    Therapy changed to   Events/Summary/Plan: pt titrated to 10L oxymask
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
## 2019-11-04 PROBLEM — E83.42 HYPOMAGNESEMIA: Status: RESOLVED | Noted: 2019-10-29 | Resolved: 2019-11-04

## 2019-11-04 PROBLEM — R79.89 ELEVATED TROPONIN: Status: RESOLVED | Noted: 2019-10-29 | Resolved: 2019-11-04

## 2019-11-04 PROBLEM — I95.9 HYPOTENSION: Status: RESOLVED | Noted: 2019-10-29 | Resolved: 2019-11-04

## 2019-11-04 LAB
ANION GAP SERPL CALC-SCNC: 3 MMOL/L (ref 0–11.9)
BUN SERPL-MCNC: 24 MG/DL (ref 8–22)
CALCIUM SERPL-MCNC: 10.2 MG/DL (ref 8.5–10.5)
CHLORIDE SERPL-SCNC: 100 MMOL/L (ref 96–112)
CO2 SERPL-SCNC: 34 MMOL/L (ref 20–33)
CREAT SERPL-MCNC: 0.9 MG/DL (ref 0.5–1.4)
ERYTHROCYTE [DISTWIDTH] IN BLOOD BY AUTOMATED COUNT: 50.4 FL (ref 35.9–50)
GLUCOSE SERPL-MCNC: 101 MG/DL (ref 65–99)
HCT VFR BLD AUTO: 43.7 % (ref 37–47)
HGB BLD-MCNC: 13.5 G/DL (ref 12–16)
MAGNESIUM SERPL-MCNC: 2.3 MG/DL (ref 1.5–2.5)
MCH RBC QN AUTO: 31.9 PG (ref 27–33)
MCHC RBC AUTO-ENTMCNC: 30.9 G/DL (ref 33.6–35)
MCV RBC AUTO: 103.3 FL (ref 81.4–97.8)
PHOSPHATE SERPL-MCNC: 2 MG/DL (ref 2.5–4.5)
PLATELET # BLD AUTO: 147 K/UL (ref 164–446)
PMV BLD AUTO: 10.6 FL (ref 9–12.9)
POTASSIUM SERPL-SCNC: 4.1 MMOL/L (ref 3.6–5.5)
RBC # BLD AUTO: 4.23 M/UL (ref 4.2–5.4)
SODIUM SERPL-SCNC: 137 MMOL/L (ref 135–145)
WBC # BLD AUTO: 7.1 K/UL (ref 4.8–10.8)

## 2019-11-04 PROCEDURE — 700101 HCHG RX REV CODE 250: Performed by: HOSPITALIST

## 2019-11-04 PROCEDURE — 92610 EVALUATE SWALLOWING FUNCTION: CPT

## 2019-11-04 PROCEDURE — 85027 COMPLETE CBC AUTOMATED: CPT

## 2019-11-04 PROCEDURE — 51798 US URINE CAPACITY MEASURE: CPT

## 2019-11-04 PROCEDURE — A9270 NON-COVERED ITEM OR SERVICE: HCPCS | Performed by: INTERNAL MEDICINE

## 2019-11-04 PROCEDURE — A9270 NON-COVERED ITEM OR SERVICE: HCPCS | Performed by: PHYSICIAN ASSISTANT

## 2019-11-04 PROCEDURE — 700102 HCHG RX REV CODE 250 W/ 637 OVERRIDE(OP): Performed by: INTERNAL MEDICINE

## 2019-11-04 PROCEDURE — 93005 ELECTROCARDIOGRAM TRACING: CPT | Performed by: HOSPITALIST

## 2019-11-04 PROCEDURE — 94668 MNPJ CHEST WALL SBSQ: CPT

## 2019-11-04 PROCEDURE — 99232 SBSQ HOSP IP/OBS MODERATE 35: CPT | Performed by: INTERNAL MEDICINE

## 2019-11-04 PROCEDURE — 83735 ASSAY OF MAGNESIUM: CPT

## 2019-11-04 PROCEDURE — 99233 SBSQ HOSP IP/OBS HIGH 50: CPT | Performed by: INTERNAL MEDICINE

## 2019-11-04 PROCEDURE — 94640 AIRWAY INHALATION TREATMENT: CPT

## 2019-11-04 PROCEDURE — 84100 ASSAY OF PHOSPHORUS: CPT

## 2019-11-04 PROCEDURE — 700102 HCHG RX REV CODE 250 W/ 637 OVERRIDE(OP): Performed by: PHYSICIAN ASSISTANT

## 2019-11-04 PROCEDURE — 36415 COLL VENOUS BLD VENIPUNCTURE: CPT

## 2019-11-04 PROCEDURE — 700101 HCHG RX REV CODE 250: Performed by: INTERNAL MEDICINE

## 2019-11-04 PROCEDURE — A9270 NON-COVERED ITEM OR SERVICE: HCPCS | Performed by: HOSPITALIST

## 2019-11-04 PROCEDURE — 700102 HCHG RX REV CODE 250 W/ 637 OVERRIDE(OP): Performed by: HOSPITALIST

## 2019-11-04 PROCEDURE — 700111 HCHG RX REV CODE 636 W/ 250 OVERRIDE (IP): Performed by: INTERNAL MEDICINE

## 2019-11-04 PROCEDURE — 99232 SBSQ HOSP IP/OBS MODERATE 35: CPT | Performed by: HOSPITALIST

## 2019-11-04 PROCEDURE — 94669 MECHANICAL CHEST WALL OSCILL: CPT

## 2019-11-04 PROCEDURE — 80048 BASIC METABOLIC PNL TOTAL CA: CPT

## 2019-11-04 PROCEDURE — 770020 HCHG ROOM/CARE - TELE (206)

## 2019-11-04 RX ORDER — AMIODARONE HYDROCHLORIDE 200 MG/1
400 TABLET ORAL
Status: DISCONTINUED | OUTPATIENT
Start: 2019-11-07 | End: 2019-11-06

## 2019-11-04 RX ORDER — AMIODARONE HYDROCHLORIDE 200 MG/1
400 TABLET ORAL TWICE DAILY
Status: DISCONTINUED | OUTPATIENT
Start: 2019-11-04 | End: 2019-11-06

## 2019-11-04 RX ORDER — AMIODARONE HYDROCHLORIDE 200 MG/1
200 TABLET ORAL
Status: DISCONTINUED | OUTPATIENT
Start: 2019-11-14 | End: 2019-11-06

## 2019-11-04 RX ADMIN — SENNOSIDES AND DOCUSATE SODIUM 2 TABLET: 8.6; 5 TABLET ORAL at 17:45

## 2019-11-04 RX ADMIN — BUDESONIDE AND FORMOTEROL FUMARATE DIHYDRATE 2 PUFF: 160; 4.5 AEROSOL RESPIRATORY (INHALATION) at 17:46

## 2019-11-04 RX ADMIN — SENNOSIDES AND DOCUSATE SODIUM 2 TABLET: 8.6; 5 TABLET ORAL at 06:16

## 2019-11-04 RX ADMIN — LEVOTHYROXINE SODIUM 175 MCG: 125 TABLET ORAL at 06:16

## 2019-11-04 RX ADMIN — METOPROLOL TARTRATE 25 MG: 25 TABLET, FILM COATED ORAL at 17:45

## 2019-11-04 RX ADMIN — NYSTATIN 1 UNITS: 100000 POWDER TOPICAL at 06:16

## 2019-11-04 RX ADMIN — METOPROLOL TARTRATE 25 MG: 25 TABLET, FILM COATED ORAL at 06:16

## 2019-11-04 RX ADMIN — RIVAROXABAN 20 MG: 20 TABLET, FILM COATED ORAL at 17:45

## 2019-11-04 RX ADMIN — ACETAMINOPHEN 325 MG: 325 TABLET, FILM COATED ORAL at 23:10

## 2019-11-04 RX ADMIN — POTASSIUM CHLORIDE 20 MEQ: 20 TABLET, EXTENDED RELEASE ORAL at 17:46

## 2019-11-04 RX ADMIN — NYSTATIN: 100000 POWDER TOPICAL at 17:46

## 2019-11-04 RX ADMIN — POTASSIUM CHLORIDE 20 MEQ: 20 TABLET, EXTENDED RELEASE ORAL at 06:16

## 2019-11-04 RX ADMIN — IPRATROPIUM BROMIDE AND ALBUTEROL SULFATE 3 ML: .5; 3 SOLUTION RESPIRATORY (INHALATION) at 14:36

## 2019-11-04 RX ADMIN — AMIODARONE HYDROCHLORIDE 400 MG: 200 TABLET ORAL at 17:45

## 2019-11-04 RX ADMIN — IPRATROPIUM BROMIDE AND ALBUTEROL SULFATE 3 ML: .5; 3 SOLUTION RESPIRATORY (INHALATION) at 07:00

## 2019-11-04 RX ADMIN — IPRATROPIUM BROMIDE AND ALBUTEROL SULFATE 3 ML: .5; 3 SOLUTION RESPIRATORY (INHALATION) at 18:35

## 2019-11-04 RX ADMIN — BUDESONIDE AND FORMOTEROL FUMARATE DIHYDRATE 2 PUFF: 160; 4.5 AEROSOL RESPIRATORY (INHALATION) at 06:16

## 2019-11-04 RX ADMIN — PREDNISONE 30 MG: 20 TABLET ORAL at 06:16

## 2019-11-04 RX ADMIN — LIDOCAINE 1 PATCH: 50 PATCH TOPICAL at 17:46

## 2019-11-04 RX ADMIN — AMIODARONE HYDROCHLORIDE 400 MG: 200 TABLET ORAL at 11:36

## 2019-11-04 ASSESSMENT — ENCOUNTER SYMPTOMS
TINGLING: 0
DIAPHORESIS: 0
POLYDIPSIA: 0
FOCAL WEAKNESS: 0
NECK PAIN: 0
GASTROINTESTINAL NEGATIVE: 1
PALPITATIONS: 0
NAUSEA: 0
WHEEZING: 0
COUGH: 0
EYES NEGATIVE: 1
SPUTUM PRODUCTION: 0
MYALGIAS: 1
HEARTBURN: 0
PHOTOPHOBIA: 0
BACK PAIN: 1
SLEEP DISTURBANCE: 1
SHORTNESS OF BREATH: 1
HEADACHES: 0
DOUBLE VISION: 0
WEAKNESS: 0
DEPRESSION: 0
DIZZINESS: 0
VOMITING: 0
NEUROLOGICAL NEGATIVE: 1
SENSORY CHANGE: 0
SINUS PAIN: 0
NERVOUS/ANXIOUS: 1
BLURRED VISION: 0
FATIGUE: 1
COUGH: 1
BACK PAIN: 0
BLOOD IN STOOL: 0
HEMOPTYSIS: 0
SPUTUM PRODUCTION: 1
WEIGHT LOSS: 0
SPEECH CHANGE: 0
NERVOUS/ANXIOUS: 0
STRIDOR: 0
MYALGIAS: 0
BRUISES/BLEEDS EASILY: 0
FEVER: 0
CHILLS: 0

## 2019-11-04 NOTE — PROGRESS NOTES
Bedside report received. Pt A&Ox4. No complaints of pain or SOB. 9L via oxymask. VSS. POC discussed with pt. Pt verbalizes understanding. Call light and belongings within reach. Bed locked and in lowest position. Alarm and fall precautions in place.

## 2019-11-04 NOTE — PROGRESS NOTES
Report given to Tracee ESPINO on 8 Tele, pt transported with RN and transport on telemetry. Chart, medications and patient belongings transported with patient.

## 2019-11-04 NOTE — CARE PLAN
Problem: Safety  Goal: Will remain free from injury  Outcome: PROGRESSING AS EXPECTED  Goal: Will remain free from falls  Outcome: PROGRESSING AS EXPECTED  Bed locked and lowest position. Bed alarm on. Treaded socks on. Call light and belongings within reach. Pt educated to call for assistance. Pt verbalized understanding. Hourly rounding in place.        Problem: Knowledge Deficit  Goal: Knowledge of disease process/condition, treatment plan, diagnostic tests, and medications will improve  Outcome: PROGRESSING AS EXPECTED  Goal: Knowledge of the prescribed therapeutic regimen will improve  Outcome: PROGRESSING AS EXPECTED  Discussed POC and medications with pt, pt verbalized understanding.

## 2019-11-04 NOTE — PROGRESS NOTES
2 RN skin check complete with Sarita RN.   Devices in place oxymask.  Skin assessed under devices yes.  Confirmed pressure ulcers found on none.  New potential pressure ulcers noted on none. Wound consult placed n/a.  The following interventions in place frequent repositioning, pillows in place to float heels/elbows.     Bilateral ears red, blanching.   Dark spot on right eye/bridge of nose.   Bilateral heels red, blanching.   Pannus and under breasts moisture associated redness.   Bottom red, discoloration, blanching.   Blister to top of right foot. Bruising on top of left foot.

## 2019-11-04 NOTE — CARE PLAN
Problem: Nutritional:  Goal: Achieve adequate nutritional intake  Description  Patient will consume 50% of most meals.   Outcome: PROGRESSING AS EXPECTED    Dysphagia 2 diet (missing bottom dentures), default thick liquids. SLP evaluating now for appropriate textures/liquids per RN. Variable PO intake <25-75% last few days. RD will continue to monitor.

## 2019-11-04 NOTE — PROGRESS NOTES
"Cardiology Follow Up Progress Note    Date of Service  11/4/2019    Attending Physician  Yunier Miranda M.D.    Chief Complaint   PAF    HPI  Cheryl Bajwa is a 69 y.o. female with COPD transferred from [facility] 10/28/2019. The following information was from the chart, not confirmed with patient: She was initially brought to [place] by EMS, she was found down in her home, obtunded. In the outside ER her ABG showed pH of 7.0 and CO2 of 160 by report. She was intubated and went into cardiac arrest, reported PEA rhythm requiring 1 round of compressions and epinephrine then had ROSC. During transport here the patient had a second arrest that was noted to be a V. fib arrest, defibrillated electrically and had pulses.     Interim Events  She remains in sinus rhythm.   This morning she is sleeping and does not answer my questions other than saying her \"breathing is OK\".     Uses 2-3L O2 at home.   Review of Systems  Review of Systems   Constitutional: Positive for fatigue.   Respiratory: Positive for shortness of breath (10L O2). Negative for cough.    Cardiovascular: Negative for chest pain and leg swelling.   Psychiatric/Behavioral: Positive for sleep disturbance.       Vital signs in last 24 hours  Temp:  [36.7 °C (98.1 °F)-36.9 °C (98.4 °F)] 36.8 °C (98.2 °F)  Pulse:  [64-82] 66  Resp:  [14-27] 16  BP: ()/(55-84) 91/60  SpO2:  [86 %-95 %] 95 %    Physical Exam  Physical Exam  Vitals signs and nursing note reviewed.   Constitutional:       Appearance: She is obese.      Comments: Sleeping, does not open her eyes but responds yes and no. BMI 45   HENT:      Head: Normocephalic and atraumatic.   Cardiovascular:      Rate and Rhythm: Normal rate and regular rhythm.      Pulses: Normal pulses.   Pulmonary:      Effort: Pulmonary effort is normal. No respiratory distress.      Comments: 10L O2 oxymask  Abdominal:      Comments: Obese abdomen   Musculoskeletal:         General: No swelling.   Skin:     " General: Skin is warm and dry.         Lab Review  Lab Results   Component Value Date/Time    WBC 7.1 11/04/2019 02:06 AM    RBC 4.23 11/04/2019 02:06 AM    HEMOGLOBIN 13.5 11/04/2019 02:06 AM    HEMATOCRIT 43.7 11/04/2019 02:06 AM    .3 (H) 11/04/2019 02:06 AM    MCH 31.9 11/04/2019 02:06 AM    MCHC 30.9 (L) 11/04/2019 02:06 AM    MPV 10.6 11/04/2019 02:06 AM      Lab Results   Component Value Date/Time    SODIUM 137 11/04/2019 02:06 AM    POTASSIUM 4.1 11/04/2019 02:06 AM    CHLORIDE 100 11/04/2019 02:06 AM    CO2 34 (H) 11/04/2019 02:06 AM    GLUCOSE 101 (H) 11/04/2019 02:06 AM    BUN 24 (H) 11/04/2019 02:06 AM    CREATININE 0.90 11/04/2019 02:06 AM      Lab Results   Component Value Date/Time    ASTSGOT 14 11/03/2019 04:14 AM    ALTSGPT 22 11/03/2019 04:14 AM     Lab Results   Component Value Date/Time    TRIGLYCERIDE 164 (H) 10/30/2019 04:47 AM    TROPONINT 44 (H) 10/29/2019 12:04 PM       Recent Labs     11/02/19  0400   NTPROBNP 154*       Cardiac Imaging and Procedures Review    Echocardiogram:  10/29/19  Left ventricular systolic function is normal. Left ventricular ejection   fraction is visually estimated to be 55%.  Dilated inferior vena cava without inspiratory collapse.  Poor endocardial definition. Cannot evaluate LV size and thickness.    Left ventricular systolic function is normal. Left ventricular ejection   fraction is visually estimated to be 55%. Wall motion is difficult to   assess with the limitations of image quality.    Right ventricle not well visualized.     Right atrium not well visualized. Dilated inferior vena cava without   inspiratory collapse.     Left atrium not well visualized.     The mitral valve is not well visualized. No mitral stenosis. No mitral   regurgitation.     The aortic valve is not well visualized. No aortic stenosis. No aortic   insufficiency.     The tricuspid valve is not well visualized.  No tricuspid   regurgitation.     The pulmonic valve is not well  visualized.     Prominent anterior fat pad present.     Ascending aorta not well visualized.      Assessment/Plan  Paroxysmal atrial flutter/fibrillation likely precipitated by hypoxemia and acute stress from respiratory distress.  Amio taper: 400mg BID for 7 days, then 400mg daily then 200mg thereafter  Metop 25mg bid  Xarelto 20mg  Echo is poor quality but LV systolic function is preserved      S/p PEA in outside hospital in setting of several hypercarbia  S/p VF arrest during transport   Reported VF by EMS during resuscitation. No documentation  No recurrence since admission  Amio load  Consider noninvasive cardiac imaging (PET may be best due to obesity) once respiratory status improved (less O2 requirements)        Chronic hypoxic respiratory failure  COPD/RLL infiltrates  - continue lasix 20mg, she should be discharged with a lasix 20mg and potassium  .   Staffed with Dr. Jacinto

## 2019-11-04 NOTE — THERAPY
Speech Language Therapy Clinical Swallow Evaluation completed.  Functional Status: Asked to see patient for swallow evaluation as she was placed on a dysphagia II diet on 11/2/19 when on HFNC.  Patient awake, alert and oriented in all spheres.  Vocal quality clear, but noted audible upper airway congestion and congested coughing at baseline.  She is currently edentulous on bottom and reports difficulty chewing due to her lower dentures being missing since admit.  She indicated she eats with dentures in and is thus not able to effectively chew with them out.  She is currently on 9L O2 via oxy mask, and was seen for swallow evaluation with dysphagia II lunch. She would only sit up to 60* angle and remained slightly in right sidelying position as she did not want to sit upright.  She was able to self feed and would remove O2, take 3-4 bites (rapid rate) and then replace mask.  She did have intermittent desaturations to the high 80s with removal of mask.  She consumed purees and nectars with delayed coughing X2, but no other overt S/Sx of aspiration noted. On thin liquids she had increased coughing following swallow which is concerning for possible penetration/aspiration.  She declined fruit and ground meats as she indicated that they are too difficult to chew and manage breathing.  She did ask to try a cracker, which she proceeded to have prolonged oral phase followed by delayed onset of swallow.  She did have coughing noted during mastication and following swallow, again, could be concerning for possible penetration/aspiration.  After education regarding how swallowing and breathing systems are dependent on each other as well as concerns for aspiration, patient was in agreement with trying a pureed diet with NTL to minimize efforts and maximize swallow function.  Friend present at bedside and also verbalized understanding.     Recommendations - Diet:  Dysphagia I diet (cardiac) with nectar thick liquids.              "               Strategies: Monitor during meals and Head of Bed at 90 Degrees--remove O2 mask--take 1 bite, replace mask.                          Medication Administration:  Float whole in puree or crush larger pills as needed  Plan of Care: Will benefit from Speech Therapy 3 times per week  Post-Acute Therapy: Recommend inpatient transitional care services for continued speech therapy services.      See \"Rehab Therapy-Acute\" Patient Summary Report for complete documentation.     "

## 2019-11-04 NOTE — CARE PLAN
Problem: Safety  Goal: Will remain free from injury  Outcome: PROGRESSING AS EXPECTED  Note:   Patient educated about risk of falls and reasoning for use of tread socks, calling for help, and bed alarms.        Problem: Knowledge Deficit  Goal: Knowledge of disease process/condition, treatment plan, diagnostic tests, and medications will improve  Outcome: PROGRESSING AS EXPECTED  Note:   Patient educated regarding medications, procedures and plan of care.

## 2019-11-04 NOTE — CARE PLAN
Problem: Knowledge Deficit  Goal: Knowledge of disease process/condition, treatment plan, diagnostic tests, and medications will improve  Outcome: PROGRESSING AS EXPECTED  Goal: Knowledge of the prescribed therapeutic regimen will improve  Outcome: PROGRESSING AS EXPECTED  Discussed POC and medications with pt, pt verbalized understanding.

## 2019-11-04 NOTE — CARE PLAN
Problem: Safety  Goal: Will remain free from injury  11/3/2019 1723 by Kassy Cunha R.N.  Outcome: PROGRESSING AS EXPECTED  11/3/2019 1723 by Kassy Cunha R.N.  Reactivated  Goal: Will remain free from falls  11/3/2019 1723 by Kassy Cunha R.N.  Outcome: PROGRESSING AS EXPECTED  11/3/2019 1723 by Kassy Cunha R.N.  Reactivated  Bed locked and lowest position. Bed alarm on. Treaded socks on. Call light and belongings within reach. Pt educated to call for assistance. Pt verbalized understanding. Hourly rounding in place.
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
## 2019-11-04 NOTE — PROGRESS NOTES
[facility] Medicine Daily Progress Note    Date of Service  11/4/2019    Chief Complaint  69 y.o. female admitted 10/28/2019 with COPD chronically on 2 L, hypothyroidism, morbid obesity, [facility] with the patient apparently was found without oxygen secondary to power outage.  Found unconscious, brought to [facility] ER with the patient had a significant respiratory acidosis with a pH of 7.0, following the patient had a PEA cardiac arrest, admitted hypotensive, intubated, placed on amiodarone for dysrhythmia, no additional evidence of pulmonary embolism  Cardiology consulted, question of LOWELL marinelli, cardiac arrest likely secondary to hypoxia and acidosis  Hospital Course    Admitted from [facility] with respiratory arrest, cardiac arrest      Interval Problem Update  Patient seen and examined today  Care and plan discussed in IDT/Hot rounds.  Lines and assistive devices reviewed.    Patient tolerating treatment and therapies.  All Data, Medication data reviewed.  Case discussed with nursing as available.  Plan of Care reviewed with patient and notified of changes.  10/30 the patient is improving on mechanical ventilation, alert and following, later extubated, blood pressure sufficient, afebrile, after extubation patient is somewhat lethargic, complaining of body aches, states she is usually on 3 L oxygen at home.  Some cough, no excessive sputum production.  10/31 patient improving, remains on oxygen therapy at 15 L, T-max 99, sinus rhythm, sitting up in chair, feels sore overall, tolerating diuresis, high flow nasal cannula delivery system PRN, poor p.o. Intake.  11/1 the patient placed on high flow nasal cannula overnight at 4 L, 70%, diuresing, replacing electrolytes, remains lethargic and complains of being sore, CBC without major change, slightly increased platelets, denies palpitations, brief episode of atrial fibrillar yesterday, on oral amiodarone, cardiology  following  11/2 patient feels better, less achy, titrating off high flow nasal cannula, to keep saturation above 86, afebrile, complaining still of losing her lower dentures, difficulty with mastication, per radiology the patient with right lung collapse on the lower portion pulling her heart to the right, no true dextrocardia.  Titrating prednisone.,  Found to have a left breast cystic structure, unclear of etiology.  11/3 patient slightly better, still chest wall pain, discussed issue of left breast lesion, improved respiratory status, positive cough with phlegm production, overall stabilization and ability to downgrade.  Laboratory data improved  11/4 patient complains of not having slept much and is grouchy, ongoing chest discomfort, question of urinary retention, soft blood pressure with diuresis, holding,  Evaluated by speech, dysphagia 1 diet, continuing steroid taper, seen by pulmonary and follow-up  Consultants/Specialty  Pulmonary critical care  Cardiology    Code Status  Full code    Disposition  telemetry    Review of Systems  Review of Systems   Constitutional: Positive for malaise/fatigue. Negative for chills and fever.   HENT: Negative.    Eyes: Negative.    Respiratory: Positive for cough, sputum production and shortness of breath.    Cardiovascular: Positive for chest pain. Negative for palpitations.   Gastrointestinal: Negative.  Negative for heartburn, nausea and vomiting.   Genitourinary: Negative.  Negative for dysuria and frequency.   Musculoskeletal: Positive for back pain and myalgias. Negative for neck pain.   Skin: Negative.  Negative for itching and rash.   Neurological: Negative.  Negative for dizziness, focal weakness, weakness and headaches.   Endo/Heme/Allergies: Negative.  Negative for polydipsia. Does not bruise/bleed easily.   Psychiatric/Behavioral: Negative for depression. The patient is nervous/anxious.         Physical Exam  Temp:  [36.7 °C (98.1 °F)-36.9 °C (98.4 °F)] 36.8 °C  (98.2 °F)  Pulse:  [64-82] 70  Resp:  [14-27] 18  BP: ()/(55-84) 91/60  SpO2:  [86 %-95 %] 93 %    Physical Exam   Constitutional: She is oriented to person, place, and time. She appears well-developed and well-nourished. She appears lethargic. She has a sickly appearance. Nasal cannula in place.   HENT:   Head: Normocephalic and atraumatic.   Nose: Nose normal.   Mouth/Throat: Oropharynx is clear and moist.   Eyes: Pupils are equal, round, and reactive to light. Conjunctivae and EOM are normal.   Neck: Normal range of motion. Neck supple. No JVD present. No thyromegaly present.   Cardiovascular: Normal rate, regular rhythm, normal heart sounds and intact distal pulses. Exam reveals no gallop and no friction rub.   Capillary refill <3 secs   Pulmonary/Chest: Effort normal. She has wheezes. She has rhonchi. She has rales.   Abdominal: Soft. Bowel sounds are normal. She exhibits no distension and no mass. There is no tenderness. There is no rebound and no guarding.   Musculoskeletal: Normal range of motion.         General: No tenderness or edema.   Lymphadenopathy:     She has no cervical adenopathy.   Neurological: She is oriented to person, place, and time. She appears lethargic. She displays normal reflexes. No cranial nerve deficit. Coordination normal.   Skin: Skin is warm and dry. She is not diaphoretic. No cyanosis.   Nursing note and vitals reviewed.      Fluids    Intake/Output Summary (Last 24 hours) at 11/4/2019 0727  Last data filed at 11/3/2019 1600  Gross per 24 hour   Intake 120 ml   Output 925 ml   Net -805 ml       Laboratory  Recent Labs     11/02/19  0400 11/03/19  0414 11/04/19  0206   WBC 6.9 7.5 7.1   RBC 4.02* 4.14* 4.23   HEMOGLOBIN 12.9 13.1 13.5   HEMATOCRIT 41.1 42.3 43.7   .2* 102.2* 103.3*   MCH 32.1 31.6 31.9   MCHC 31.4* 31.0* 30.9*   RDW 50.8* 49.4 50.4*   PLATELETCT 125* 134* 147*   MPV 10.6 10.3 10.6     Recent Labs     11/02/19  0400 11/03/19  0414 11/04/19  0206    SODIUM 138 139 137   POTASSIUM 4.2 4.4 4.1   CHLORIDE 97 100 100   CO2 37* 35* 34*   GLUCOSE 83 126* 101*   BUN 21 20 24*   CREATININE 1.00 0.96 0.90   CALCIUM 8.6 9.0 10.2                   Imaging  DX-CHEST-PORTABLE (1 VIEW)   Final Result      1.  Possible improved consolidation in the right lung versus related to differences in rotation.   2.  Improved interstitial opacity/edema.   3.  Left basilar atelectasis.      DX-CHEST-PORTABLE (1 VIEW)   Final Result      1.  Removal of endotracheal tube and enteric catheters      2.  No other change      DX-CHEST-PORTABLE (1 VIEW)   Final Result      Improved left upper lobe atelectasis and consolidation      Otherwise stable right worse than left consolidation and atelectasis with probable right pleural fluid      Bilateral acute rib fractures      EC-ECHOCARDIOGRAM COMPLETE W/O CONT   Final Result      DX-ABDOMEN FOR TUBE PLACEMENT   Final Result      Feeding tube in place as noted above.      DX-CHEST-PORTABLE (1 VIEW)   Final Result      Improving right upper lobe atelectasis. No significant change otherwise.      DX-ABDOMEN FOR TUBE PLACEMENT   Final Result      Nasogastric tube in place as noted above.      CT-CTA CHEST PULMONARY ARTERY W/ RECONS   Final Result      1.  No evidence of pulmonary emboli   2.  Extensive airspace opacities and atelectasis with marked volume loss in the right lung. A small right pleural effusion is also present, along with a tiny right pneumothorax.   3.  Patchy airspace opacities in the left upper lobe as well as left lower lobe atelectasis with tiny left pleural effusion.            DX-CHEST-PORTABLE (1 VIEW)   Final Result      Diffuse bilateral interstitial opacities, suggesting pulmonary edema. Additionally there are confluent and linear opacities throughout the right perihilar and upper lung field and in the left base, consistent with a combination of pneumonia and    atelectasis. All of this is considerably worse compared with  the prior image.           Assessment/Plan  * Acute on chronic respiratory failure with hypoxia and hypercapnia (HCC)  Assessment & Plan  Due to significant hypercarbia, found to be hypoxic, acidemic, apparently without oxygen in place  Subsequently intbuated 10/28/2019, extubated 10/30  Continue respiratory protocol  Wean oxygen as tolerated  Pulmonary toilet and continue bronchodilators      Respiratory arrest (HCC)- (present on admission)  Assessment & Plan  Secondary to profound hypoxia, respiratory compromise  Negative for pulmonary embolism  Chronically oxygen dependent  Pulmonary critical care following    Left breast mass  Assessment & Plan  Incidental finding on CT  Discussed aspiration/biopsy      Atrial flutter (HCC)  Assessment & Plan  Brief episode, continue with amiodarone, PRN Lopressor, anticoagulation with Xarelto    Ventricular tachycardia (HCC)- (present on admission)  Assessment & Plan  Initially on amiodarone, cardiology following, antiarrhythmic therapy discontinued, monitor  Echocardiogram noted with good EF      Pulmonary hypertension (HCC)  Assessment & Plan  Likely from chronic pulmonary disease, diuresed appropriately    Chronic obstructive pulmonary disease with acute exacerbation (HCC)  Assessment & Plan  Ongoing respiratory treatment, inhaled steroids, rhonchi dilators    Closed fracture of multiple ribs of both sides  Assessment & Plan  Acute and previously healed, pain control    Collapse of right lung  Assessment & Plan  Appears to be related to prior injury/trauma    Encephalopathy- (present on admission)  Assessment & Plan  Improving, no definitive anoxic brain injury  Follow clinically  Monitor      Thrombocytopenia (HCC)- (present on admission)  Assessment & Plan  Mild, cont to monitor    Morbid obesity (HCC)- (present on admission)  Assessment & Plan  Encourage weight loss when appropriate  Plan  Wean oxygen as tolerated, goal saturation 86% or higher  Continue treatment for  COPD, pulmonary toilet, Aerobika, incentive spirometry  Steroids to taper, inhaled steroids  Broncho-dilators to continue  Pulmonary toilet to continue  Stop diuresis, soft blood pressures limiting  Mobilize as tolerated  Breast biopsy to discuss again, patient not open today to discuss  A.m. Labs  Balance electrolytes  See orders  Medically complex high risk  Patient overall stabilized for transfer to medical unit/telemetry unit with close respiratory therapy follow-up  VTE prophylaxis: Heparin    I have performed a physical exam and reviewed and updated ROS and Plan today . In review of yesterday's note , there are no changes except as documented above.

## 2019-11-05 ENCOUNTER — APPOINTMENT (OUTPATIENT)
Dept: RADIOLOGY | Facility: MEDICAL CENTER | Age: 69
DRG: 208 | End: 2019-11-05
Attending: INTERNAL MEDICINE
Payer: MEDICARE

## 2019-11-05 PROBLEM — R33.9 URINARY RETENTION: Status: ACTIVE | Noted: 2019-11-05

## 2019-11-05 LAB
BASE EXCESS BLDA CALC-SCNC: 5 MMOL/L (ref -4–3)
BODY TEMPERATURE: ABNORMAL CENTIGRADE
EKG IMPRESSION: NORMAL
EKG IMPRESSION: NORMAL
HCO3 BLDA-SCNC: 32 MMOL/L (ref 17–25)
PCO2 BLDA: 53.7 MMHG (ref 26–37)
PH BLDA: 7.39 [PH] (ref 7.4–7.5)
PO2 BLDA: 62.7 MMHG (ref 64–87)
PROCALCITONIN SERPL-MCNC: 0.06 NG/ML
SAO2 % BLDA: 92.2 % (ref 93–99)

## 2019-11-05 PROCEDURE — 700101 HCHG RX REV CODE 250: Performed by: INTERNAL MEDICINE

## 2019-11-05 PROCEDURE — 700102 HCHG RX REV CODE 250 W/ 637 OVERRIDE(OP): Performed by: INTERNAL MEDICINE

## 2019-11-05 PROCEDURE — 93010 ELECTROCARDIOGRAM REPORT: CPT | Mod: 76 | Performed by: INTERNAL MEDICINE

## 2019-11-05 PROCEDURE — 82803 BLOOD GASES ANY COMBINATION: CPT

## 2019-11-05 PROCEDURE — A9270 NON-COVERED ITEM OR SERVICE: HCPCS | Performed by: INTERNAL MEDICINE

## 2019-11-05 PROCEDURE — 94668 MNPJ CHEST WALL SBSQ: CPT

## 2019-11-05 PROCEDURE — 97535 SELF CARE MNGMENT TRAINING: CPT

## 2019-11-05 PROCEDURE — 700105 HCHG RX REV CODE 258: Performed by: INTERNAL MEDICINE

## 2019-11-05 PROCEDURE — 51798 US URINE CAPACITY MEASURE: CPT

## 2019-11-05 PROCEDURE — 99233 SBSQ HOSP IP/OBS HIGH 50: CPT | Performed by: INTERNAL MEDICINE

## 2019-11-05 PROCEDURE — A9270 NON-COVERED ITEM OR SERVICE: HCPCS | Performed by: PHYSICIAN ASSISTANT

## 2019-11-05 PROCEDURE — 700111 HCHG RX REV CODE 636 W/ 250 OVERRIDE (IP): Performed by: INTERNAL MEDICINE

## 2019-11-05 PROCEDURE — 99232 SBSQ HOSP IP/OBS MODERATE 35: CPT | Performed by: INTERNAL MEDICINE

## 2019-11-05 PROCEDURE — 700102 HCHG RX REV CODE 250 W/ 637 OVERRIDE(OP): Performed by: HOSPITALIST

## 2019-11-05 PROCEDURE — 700102 HCHG RX REV CODE 250 W/ 637 OVERRIDE(OP): Performed by: PHYSICIAN ASSISTANT

## 2019-11-05 PROCEDURE — A9270 NON-COVERED ITEM OR SERVICE: HCPCS | Performed by: HOSPITALIST

## 2019-11-05 PROCEDURE — 84145 PROCALCITONIN (PCT): CPT

## 2019-11-05 PROCEDURE — 36415 COLL VENOUS BLD VENIPUNCTURE: CPT

## 2019-11-05 PROCEDURE — 71045 X-RAY EXAM CHEST 1 VIEW: CPT

## 2019-11-05 PROCEDURE — 94640 AIRWAY INHALATION TREATMENT: CPT

## 2019-11-05 PROCEDURE — 770020 HCHG ROOM/CARE - TELE (206)

## 2019-11-05 RX ORDER — FUROSEMIDE 40 MG/1
40 TABLET ORAL
Status: DISCONTINUED | OUTPATIENT
Start: 2019-11-05 | End: 2019-11-15

## 2019-11-05 RX ADMIN — GUAIFENESIN 600 MG: 600 TABLET, EXTENDED RELEASE ORAL at 05:47

## 2019-11-05 RX ADMIN — GUAIFENESIN 600 MG: 600 TABLET, EXTENDED RELEASE ORAL at 18:00

## 2019-11-05 RX ADMIN — IPRATROPIUM BROMIDE AND ALBUTEROL SULFATE 3 ML: .5; 3 SOLUTION RESPIRATORY (INHALATION) at 05:24

## 2019-11-05 RX ADMIN — POTASSIUM CHLORIDE 20 MEQ: 20 TABLET, EXTENDED RELEASE ORAL at 05:47

## 2019-11-05 RX ADMIN — SENNOSIDES AND DOCUSATE SODIUM 2 TABLET: 8.6; 5 TABLET ORAL at 05:47

## 2019-11-05 RX ADMIN — RIVAROXABAN 20 MG: 20 TABLET, FILM COATED ORAL at 18:03

## 2019-11-05 RX ADMIN — NYSTATIN: 100000 POWDER TOPICAL at 05:46

## 2019-11-05 RX ADMIN — AMIODARONE HYDROCHLORIDE 400 MG: 200 TABLET ORAL at 18:00

## 2019-11-05 RX ADMIN — NYSTATIN: 100000 POWDER TOPICAL at 18:03

## 2019-11-05 RX ADMIN — BUDESONIDE AND FORMOTEROL FUMARATE DIHYDRATE 2 PUFF: 160; 4.5 AEROSOL RESPIRATORY (INHALATION) at 05:45

## 2019-11-05 RX ADMIN — BUDESONIDE AND FORMOTEROL FUMARATE DIHYDRATE 2 PUFF: 160; 4.5 AEROSOL RESPIRATORY (INHALATION) at 18:00

## 2019-11-05 RX ADMIN — PREDNISONE 30 MG: 20 TABLET ORAL at 05:47

## 2019-11-05 RX ADMIN — POTASSIUM CHLORIDE 20 MEQ: 20 TABLET, EXTENDED RELEASE ORAL at 18:04

## 2019-11-05 RX ADMIN — IPRATROPIUM BROMIDE AND ALBUTEROL SULFATE 3 ML: .5; 3 SOLUTION RESPIRATORY (INHALATION) at 07:19

## 2019-11-05 RX ADMIN — SENNOSIDES AND DOCUSATE SODIUM 2 TABLET: 8.6; 5 TABLET ORAL at 18:03

## 2019-11-05 RX ADMIN — AMIODARONE HYDROCHLORIDE 400 MG: 200 TABLET ORAL at 05:46

## 2019-11-05 RX ADMIN — LEVOTHYROXINE SODIUM 175 MCG: 125 TABLET ORAL at 05:47

## 2019-11-05 RX ADMIN — IPRATROPIUM BROMIDE AND ALBUTEROL SULFATE 3 ML: .5; 3 SOLUTION RESPIRATORY (INHALATION) at 18:43

## 2019-11-05 RX ADMIN — POTASSIUM PHOSPHATE, MONOBASIC AND POTASSIUM PHOSPHATE, DIBASIC 30 MMOL: 224; 236 INJECTION, SOLUTION, CONCENTRATE INTRAVENOUS at 08:54

## 2019-11-05 RX ADMIN — METOPROLOL TARTRATE 25 MG: 25 TABLET, FILM COATED ORAL at 05:47

## 2019-11-05 RX ADMIN — FUROSEMIDE 40 MG: 40 TABLET ORAL at 18:00

## 2019-11-05 RX ADMIN — METOPROLOL TARTRATE 25 MG: 25 TABLET, FILM COATED ORAL at 18:01

## 2019-11-05 ASSESSMENT — ENCOUNTER SYMPTOMS
COUGH: 1
DIZZINESS: 0
DIAPHORESIS: 0
HEADACHES: 0
WHEEZING: 0
FEVER: 0
BLOOD IN STOOL: 0
HEARTBURN: 0
SORE THROAT: 0
CHILLS: 0
SPUTUM PRODUCTION: 0
SPEECH CHANGE: 0
COUGH: 0
PALPITATIONS: 0
SINUS PAIN: 0
BACK PAIN: 0
MYALGIAS: 0
BLURRED VISION: 0
DOUBLE VISION: 0
BRUISES/BLEEDS EASILY: 0
PHOTOPHOBIA: 0
DIARRHEA: 0
TINGLING: 0
CONSTIPATION: 0
NECK PAIN: 0
VOMITING: 0
DEPRESSION: 0
SENSORY CHANGE: 0
WEIGHT LOSS: 0
ABDOMINAL PAIN: 0
NAUSEA: 0
FALLS: 0
POLYDIPSIA: 0
SHORTNESS OF BREATH: 1
SHORTNESS OF BREATH: 0
STRIDOR: 0
HEMOPTYSIS: 0
FOCAL WEAKNESS: 0
NERVOUS/ANXIOUS: 0

## 2019-11-05 ASSESSMENT — COGNITIVE AND FUNCTIONAL STATUS - GENERAL
HELP NEEDED FOR BATHING: TOTAL
SUGGESTED CMS G CODE MODIFIER DAILY ACTIVITY: CL
DRESSING REGULAR LOWER BODY CLOTHING: TOTAL
TOILETING: TOTAL
DAILY ACTIVITIY SCORE: 11
PERSONAL GROOMING: A LITTLE
DRESSING REGULAR UPPER BODY CLOTHING: A LOT
EATING MEALS: A LITTLE

## 2019-11-05 NOTE — CARE PLAN
Problem: Safety - Medical Restraint  Goal: Free from restraint(s) (Restraint for Interference with Medical Device)  Description  INTERVENTIONS:  1. ONCE/SHIFT or MINIMUM Q12H: Assess and document the continuing need for restraints  2. Q24H: Continued use of restraint requires LIP to perform face to face examination and written order  3. Identify and implement measures to help patient regain control  Outcome: PROGRESSING AS EXPECTED     Problem: Mobility  Goal: Risk for activity intolerance will decrease  Outcome: PROGRESSING SLOWER THAN EXPECTED

## 2019-11-05 NOTE — CARE PLAN
Problem: Bronchoconstriction:  Goal: Improve in air movement and diminished wheezing  Intervention: Implement inhaled treatments  Note:   Duo QID  Symbicort   Flutter   86% ok per Pia

## 2019-11-05 NOTE — PROGRESS NOTES
Assumed care of patient and bedside report received from previous RN. Patient is alert and oriented x 4. VSS. Bed controls on, bed locked and in lowest position, call light with patient. Patient is in rhythm: SR. Pt receiving breathing tx at this time.

## 2019-11-05 NOTE — PROGRESS NOTES
Emilee from Lab called with critical result of PCO2 53.7 at 134. Critical lab result read back to Emilee.   Dr. Hernandez notified of critical lab result at 1345.  Critical lab result read back by Dr. Hernandez.

## 2019-11-05 NOTE — FLOWSHEET NOTE
Respiratory Therapy Update    Interdisciplinary Plan of Care-Goals (Indications)  Bronchodilator Indications: History / Diagnosis;Strong Subjective / Objective Improvement (11/05/19 0720)  Bronchopulmonary Hygiene Indications: Difficulty with Secretion Clearance (11/05/19 0526)  Interdisciplinary Plan of Care-Outcomes   Bronchodilator Outcome: Patient at Stable Baseline (11/05/19 0720)  Bronchopulmonary Hygiene Outcome: Optimal Hydration with Moderate or Less Sputum Production (11/04/19 1836)    #PEP/CPT (Manual) Initial: Initial (11/02/19 1125)     #SVN Performed: Yes (11/05/19 0720)    Cough: Congested;Productive (11/05/19 0720)  Sputum Amount: Small;Large (11/05/19 0720)  Sputum Color: Tan;Yellow (11/05/19 0720)  Sputum Consistency: Thick (11/05/19 0720)    Heated Hi Flow Nasal Cannula (HHFNC): Yes (11/02/19 0709)  FiO2 (HHFNC): 60 (11/02/19 0709)  Flowrate (HHFNC): 40 (11/02/19 0709)    FiO2%: 57 % (11/04/19 1438)  O2 (LPM): 12 (11/05/19 1420)  O2 Daily Delivery Respiratory : OxyMask (11/05/19 1420)    Breath Sounds  Pre/Post Intervention: Pre Intervention Assessment (11/05/19 0720)  RUL Breath Sounds: Diminished;Expiratory Wheezes;Fine Crackles (11/05/19 0800)  RML Breath Sounds: Diminished;Expiratory Wheezes (11/05/19 0800)  RLL Breath Sounds: Diminished (11/05/19 0800)  MARIO Breath Sounds: Diminished;Expiratory Wheezes;Fine Crackles (11/05/19 0800)  LLL Breath Sounds: Diminished (11/05/19 0800)        Events/Summary/Plan: Pt. refused tx.  She does not want to be bothered.  RN is aware.  Pt. refusing everything (11/05/19 1420)

## 2019-11-05 NOTE — ASSESSMENT & PLAN NOTE
Symbicort, RT protocol  No signs of pulmonary hypertension on the echo  Continue Lasix and inhalers at this time

## 2019-11-05 NOTE — RESPIRATORY CARE
COPD EDUCATION by COPD CLINICAL EDUCATOR  11/5/2019 at 2:39 PM by Orlando Segovia     Patient interviewed by COPD education team. Patient refused COPD program at this time.

## 2019-11-05 NOTE — PROGRESS NOTES
Monitor Summary:    S.R. 68-91  Converted to a-fib at 1655 and then converted back to S.R. at 1932  (R)PAC  20/08/36

## 2019-11-05 NOTE — CARE PLAN
Problem: Safety  Goal: Will remain free from injury  Outcome: PROGRESSING AS EXPECTED  Intervention: Provide assistance with mobility  Note:   Patient has been provided assistance with mobility throughout the shift and has remained free from injury.   Goal: Will remain free from falls  Outcome: PROGRESSING AS EXPECTED  Intervention: Assess risk factors for falls  Note:   Patient has been assessed for fall risks and fall precautions have been put in place.
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
## 2019-11-05 NOTE — PROGRESS NOTES
Dr Hernandez notified that pt is requiring 10L O2 mask sattinig at 90%. MD to bedside assessing pt. Orders received for STAT ABG and CXR. VS otherwise stable. No complaints from pt at this time

## 2019-11-06 PROBLEM — E83.39 HYPOPHOSPHATASIA: Status: ACTIVE | Noted: 2019-11-06

## 2019-11-06 LAB
ALBUMIN SERPL BCP-MCNC: 3.3 G/DL (ref 3.2–4.9)
BUN SERPL-MCNC: 24 MG/DL (ref 8–22)
CALCIUM SERPL-MCNC: 9.5 MG/DL (ref 8.5–10.5)
CHLORIDE SERPL-SCNC: 102 MMOL/L (ref 96–112)
CO2 SERPL-SCNC: 32 MMOL/L (ref 20–33)
CREAT SERPL-MCNC: 0.85 MG/DL (ref 0.5–1.4)
GLUCOSE SERPL-MCNC: 99 MG/DL (ref 65–99)
PHOSPHATE SERPL-MCNC: 2 MG/DL (ref 2.5–4.5)
POTASSIUM SERPL-SCNC: 4.3 MMOL/L (ref 3.6–5.5)
SODIUM SERPL-SCNC: 138 MMOL/L (ref 135–145)

## 2019-11-06 PROCEDURE — 97535 SELF CARE MNGMENT TRAINING: CPT

## 2019-11-06 PROCEDURE — 700102 HCHG RX REV CODE 250 W/ 637 OVERRIDE(OP): Performed by: HOSPITALIST

## 2019-11-06 PROCEDURE — 92526 ORAL FUNCTION THERAPY: CPT

## 2019-11-06 PROCEDURE — 99233 SBSQ HOSP IP/OBS HIGH 50: CPT | Performed by: INTERNAL MEDICINE

## 2019-11-06 PROCEDURE — 80069 RENAL FUNCTION PANEL: CPT

## 2019-11-06 PROCEDURE — A9270 NON-COVERED ITEM OR SERVICE: HCPCS | Performed by: INTERNAL MEDICINE

## 2019-11-06 PROCEDURE — 94669 MECHANICAL CHEST WALL OSCILL: CPT

## 2019-11-06 PROCEDURE — A9270 NON-COVERED ITEM OR SERVICE: HCPCS | Performed by: PHYSICIAN ASSISTANT

## 2019-11-06 PROCEDURE — 700111 HCHG RX REV CODE 636 W/ 250 OVERRIDE (IP): Performed by: INTERNAL MEDICINE

## 2019-11-06 PROCEDURE — 36415 COLL VENOUS BLD VENIPUNCTURE: CPT

## 2019-11-06 PROCEDURE — 94664 DEMO&/EVAL PT USE INHALER: CPT

## 2019-11-06 PROCEDURE — 700101 HCHG RX REV CODE 250: Performed by: INTERNAL MEDICINE

## 2019-11-06 PROCEDURE — 700101 HCHG RX REV CODE 250: Performed by: HOSPITALIST

## 2019-11-06 PROCEDURE — 700102 HCHG RX REV CODE 250 W/ 637 OVERRIDE(OP): Performed by: INTERNAL MEDICINE

## 2019-11-06 PROCEDURE — 770020 HCHG ROOM/CARE - TELE (206)

## 2019-11-06 PROCEDURE — 94668 MNPJ CHEST WALL SBSQ: CPT

## 2019-11-06 PROCEDURE — 99232 SBSQ HOSP IP/OBS MODERATE 35: CPT | Performed by: INTERNAL MEDICINE

## 2019-11-06 PROCEDURE — 700102 HCHG RX REV CODE 250 W/ 637 OVERRIDE(OP): Performed by: PHYSICIAN ASSISTANT

## 2019-11-06 PROCEDURE — 94640 AIRWAY INHALATION TREATMENT: CPT

## 2019-11-06 PROCEDURE — A9270 NON-COVERED ITEM OR SERVICE: HCPCS | Performed by: HOSPITALIST

## 2019-11-06 RX ORDER — BUDESONIDE AND FORMOTEROL FUMARATE DIHYDRATE 160; 4.5 UG/1; UG/1
2 AEROSOL RESPIRATORY (INHALATION)
Status: DISCONTINUED | OUTPATIENT
Start: 2019-11-06 | End: 2019-11-21 | Stop reason: HOSPADM

## 2019-11-06 RX ORDER — CALCIUM CARBONATE 500 MG/1
500 TABLET, CHEWABLE ORAL ONCE
Status: ACTIVE | OUTPATIENT
Start: 2019-11-06 | End: 2019-11-07

## 2019-11-06 RX ORDER — POTASSIUM CHLORIDE 20 MEQ/1
20 TABLET, EXTENDED RELEASE ORAL DAILY
Status: DISCONTINUED | OUTPATIENT
Start: 2019-11-07 | End: 2019-11-10

## 2019-11-06 RX ADMIN — IPRATROPIUM BROMIDE AND ALBUTEROL SULFATE 3 ML: .5; 3 SOLUTION RESPIRATORY (INHALATION) at 11:08

## 2019-11-06 RX ADMIN — RIVAROXABAN 20 MG: 20 TABLET, FILM COATED ORAL at 17:29

## 2019-11-06 RX ADMIN — SENNOSIDES AND DOCUSATE SODIUM 2 TABLET: 8.6; 5 TABLET ORAL at 06:03

## 2019-11-06 RX ADMIN — PREDNISONE 20 MG: 20 TABLET ORAL at 06:03

## 2019-11-06 RX ADMIN — ACETAMINOPHEN 325 MG: 325 TABLET, FILM COATED ORAL at 02:32

## 2019-11-06 RX ADMIN — LEVOTHYROXINE SODIUM 175 MCG: 125 TABLET ORAL at 06:02

## 2019-11-06 RX ADMIN — DIBASIC SODIUM PHOSPHATE, MONOBASIC POTASSIUM PHOSPHATE AND MONOBASIC SODIUM PHOSPHATE 1 TABLET: 852; 155; 130 TABLET ORAL at 17:29

## 2019-11-06 RX ADMIN — METOPROLOL TARTRATE 25 MG: 25 TABLET, FILM COATED ORAL at 17:29

## 2019-11-06 RX ADMIN — GUAIFENESIN 600 MG: 600 TABLET, EXTENDED RELEASE ORAL at 17:29

## 2019-11-06 RX ADMIN — IPRATROPIUM BROMIDE AND ALBUTEROL SULFATE 3 ML: .5; 3 SOLUTION RESPIRATORY (INHALATION) at 19:03

## 2019-11-06 RX ADMIN — IPRATROPIUM BROMIDE AND ALBUTEROL SULFATE 3 ML: .5; 3 SOLUTION RESPIRATORY (INHALATION) at 15:07

## 2019-11-06 RX ADMIN — DIBASIC SODIUM PHOSPHATE, MONOBASIC POTASSIUM PHOSPHATE AND MONOBASIC SODIUM PHOSPHATE 1 TABLET: 852; 155; 130 TABLET ORAL at 10:20

## 2019-11-06 RX ADMIN — AMIODARONE HYDROCHLORIDE 400 MG: 200 TABLET ORAL at 06:03

## 2019-11-06 RX ADMIN — LIDOCAINE 1 PATCH: 50 PATCH TOPICAL at 13:10

## 2019-11-06 RX ADMIN — BUDESONIDE AND FORMOTEROL FUMARATE DIHYDRATE 2 PUFF: 160; 4.5 AEROSOL RESPIRATORY (INHALATION) at 06:04

## 2019-11-06 RX ADMIN — GUAIFENESIN 600 MG: 600 TABLET, EXTENDED RELEASE ORAL at 06:03

## 2019-11-06 RX ADMIN — IPRATROPIUM BROMIDE AND ALBUTEROL SULFATE 3 ML: .5; 3 SOLUTION RESPIRATORY (INHALATION) at 07:44

## 2019-11-06 RX ADMIN — POTASSIUM CHLORIDE 20 MEQ: 20 TABLET, EXTENDED RELEASE ORAL at 06:03

## 2019-11-06 RX ADMIN — NYSTATIN: 100000 POWDER TOPICAL at 06:04

## 2019-11-06 RX ADMIN — BUDESONIDE AND FORMOTEROL FUMARATE DIHYDRATE 2 PUFF: 160; 4.5 AEROSOL RESPIRATORY (INHALATION) at 19:03

## 2019-11-06 RX ADMIN — METOPROLOL TARTRATE 25 MG: 25 TABLET, FILM COATED ORAL at 06:03

## 2019-11-06 RX ADMIN — FUROSEMIDE 40 MG: 40 TABLET ORAL at 06:03

## 2019-11-06 ASSESSMENT — ENCOUNTER SYMPTOMS
SPUTUM PRODUCTION: 0
FALLS: 0
NERVOUS/ANXIOUS: 0
DOUBLE VISION: 0
STRIDOR: 0
DIARRHEA: 0
DIAPHORESIS: 0
TINGLING: 0
DIZZINESS: 0
ABDOMINAL PAIN: 0
WHEEZING: 0
FEVER: 0
COUGH: 1
HEADACHES: 0
BACK PAIN: 0
BLURRED VISION: 0
SHORTNESS OF BREATH: 1
DEPRESSION: 0
WEIGHT LOSS: 0
MYALGIAS: 0
SORE THROAT: 0
SINUS PAIN: 0
FOCAL WEAKNESS: 0
BRUISES/BLEEDS EASILY: 0
HEMOPTYSIS: 0
NECK PAIN: 0
CONSTIPATION: 0
PALPITATIONS: 0
NAUSEA: 0
POLYDIPSIA: 0
PHOTOPHOBIA: 0
VOMITING: 0
HEARTBURN: 0
CHILLS: 0
BLOOD IN STOOL: 0
SPEECH CHANGE: 0
SENSORY CHANGE: 0

## 2019-11-06 ASSESSMENT — GAIT ASSESSMENTS: GAIT LEVEL OF ASSIST: UNABLE TO PARTICIPATE
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
## 2019-11-06 NOTE — DIETARY
Nutrition Services: Update   Day 9 of admit.  Cheryl Bajwa is a 69 y.o. female with admitting DX of Cardiac arrest    Pt downgraded to Dys 1 with NTL per SLP evaluation 11/4. PO intake % all meals documented since diet downgraded.    Malnutrition Risk: Morbid Obesity    Recommendations/Plan:  1. Encourage intake of meals  2. Document intake of all meals as % taken in ADL's to provide interdisciplinary communication across all shifts.   3. Monitor weight.  4. Nutrition rep will continue to see patient for ongoing meal and snack preferences.  5. Obtain supplement order per RD as needed.    RD following weekly

## 2019-11-06 NOTE — CARE PLAN
Problem: Nutritional:  Goal: Achieve adequate nutritional intake  Description  Patient will consume 50% of most meals.   Outcome: MET    See RD note

## 2019-11-06 NOTE — CARE PLAN
Respiratory Update    Treatment modality: SVN, Flutter  Frequency: DUO QID   Pt is on 5LPM SPO2 is 93%    Pt tolerating current treatments well with no adverse reactions.

## 2019-11-06 NOTE — THERAPY
"Occupational Therapy Treatment completed with focus on ADLs, patient education and cognition.  Functional Status:  Pt supine in bed on arrival.  Pt sating at 92% on O2 mask.  Pt with multiple c/o & reports she's too tired to do anything.  Pt advised that its very important for her lungs & overall health to get up OOB.  Pt having a lot of pain in her bilateral feet.  Pt has a hematoma on the top of her right foot with bruising & edema & some bruising & edema on her left foot.  Pt unable to tell me what happened.  Pt required Mod A for supine to sit EOB.  Pt puts in very poor effort.  Pt was supervised for EOB sitting.  Pt's O2 sats improved with activity.  Pt attempted sit-stand multiple times with Max A but unable to achieve full upright standing.  Pt was Mod A for sit-supine & demanded we scoot her up higher in bed.  CNA arrived to assist with boost.  Pt encouraged to lay on her left side, but she refused.  Nsg notified of OT findings.  Plan of Care: Will benefit from Occupational Therapy 3 times per week  Discharge Recommendations:  Equipment Will Continue to Assess for Equipment Needs. Post-acute therapy Discharge to a transitional care facility for continued skilled therapy services.    Pt reports she was living alone PTA.  Pt currently is very self limiting & not putting in a lot of effort to make progress.  Pt will need Post Acute services once medically cleared.    See \"Rehab Therapy-Acute\" Patient Summary Report for complete documentation.   "

## 2019-11-06 NOTE — CARE PLAN
Problem: Safety  Goal: Will remain free from falls  Outcome: PROGRESSING AS EXPECTED     Problem: Bowel/Gastric:  Goal: Will not experience complications related to bowel motility  Outcome: PROGRESSING AS EXPECTED     Problem: Respiratory:  Goal: Respiratory status will improve  Outcome: PROGRESSING AS EXPECTED     Problem: Mobility  Goal: Risk for activity intolerance will decrease  Outcome: PROGRESSING SLOWER THAN EXPECTED     Problem: Urinary Elimination:  Goal: Ability to reestablish a normal urinary elimination pattern will improve  Outcome: PROGRESSING SLOWER THAN EXPECTED

## 2019-11-06 NOTE — CARE PLAN
Problem: Safety  Goal: Will remain free from falls  Outcome: PROGRESSING AS EXPECTED  Intervention: Implement fall precautions  Flowsheets (Taken 11/5/2019 2000)  Environmental Precautions: Treaded Slipper Socks on Patient;Personal Belongings, Wastebasket, Call Bell etc. in Easy Reach;Report Given to Other Health Care Providers Regarding Fall Risk;Transferred to Stronger Side;Bed in Low Position;Communication Sign for Patients & Families;Mobility Assessed & Appropriate Sign Placed     Problem: Knowledge Deficit  Goal: Knowledge of disease process/condition, treatment plan, diagnostic tests, and medications will improve  Outcome: PROGRESSING AS EXPECTED  Intervention: Explain information regarding disease process/condition, treatment plan, diagnostic tests, and medications and document in education  Note:   Explained to pt about disease process, treatment plan, procedures and medications.

## 2019-11-06 NOTE — PROGRESS NOTES
Pt refusing Q2 turns and refusing to turn to left side despite education regarding lung status. Pt also refusing respiratory treatments despite reinforced education from MD, RT, and multiple RNs.

## 2019-11-06 NOTE — THERAPY
"Speech Language Therapy dysphagia treatment completed.   Functional Status:  Patient seen for swallowing reassessment this date as RN reporting that patient is requesting regular liquids.  Patient now on 4L O2 via nasal cannula with saturations ranging from 89-92% (per RN, acceptable range for saturations is 86-92%).  She needed encouragement to participate as she was \"just finally getting to take a nap.\" 11/5/19 chest X-ray: \"1.  Progressive volume loss in the right lung, with progressive rightward mediastinal shift. Underlying pneumonia and small pleural effusion are possible in the proper clinical settings.  2.  Slight worsening of left basilar atelectasis versus consolidation. No significant left effusion.\"  Patient repositioned in bed, but would only sit to no more than 60* angle as that was most comfortable.  She declined to get to chair despite asking 2 different times.  She also declined to sit EOB stating \"not today.\"  Presentation of PO consisted of thin liquids, nectars, purees and crackers (\"because they will melt in my mouth\").  She declined any softer solid foods due to her lower partial missing.  On purees and nectars, she did not have any overt S/Sx of aspiration noted.  She was noted to self feed at rapid rate, and was cued to slow down. While chewing crackers, she had drops in saturations to 80% SpO and started coughing during oral phase and following swallow, which is concerning for possible aspiration.  With cues to take deep breaths and reposition in bed, saturations increased to 88-89%. On thin liquids, patient with inconsistent wet voice following swallow as well as delayed coughing.  Saturations dropped to 84%, and patient needed cues to take deep breaths to recover.  Again, there is concern for aspiration.  At this time, would recommend that patient remain on pureed diet/dysphagia I with nectar thick.  Would encourage her to try sitting up higher than 60* angle and would prefer if she would " "get out of bed to chair for PO.  SLP will continue to follow.    Recommendations - Diet:  Dysphagia I diet (cardiac) with nectar thick liquids.                            Strategies: Monitor during meals and Head of Bed at 90 Degrees--remove O2 mask--take 1 bite, replace mask.                          Medication Administration:  Float whole in puree or crush larger pills as needed  Plan of Care: Will benefit from Speech Therapy 3 times per week  Post-Acute Therapy: Recommend inpatient transitional care services for continued speech therapy services.      See \"Rehab Therapy-Acute\" Patient Summary Report for complete documentation.     "

## 2019-11-06 NOTE — PROGRESS NOTES
Assumed care of pt, bedside report received from KENZIE Lua. Call light within reach. Bed alarm on. Addressed POC with pt, no additional questions at this time.

## 2019-11-06 NOTE — THERAPY
Physical Therapy Contact Note    Pt declining EOB this AM for swallow eval, stating she cannot put her foot against gravity today per speech, will round when able for reinforcement of OOB mobility;    Deidra Mcdonald, PT, DPT Pager: [phone]

## 2019-11-07 ENCOUNTER — APPOINTMENT (OUTPATIENT)
Dept: RADIOLOGY | Facility: MEDICAL CENTER | Age: 69
DRG: 208 | End: 2019-11-07
Attending: INTERNAL MEDICINE
Payer: MEDICARE

## 2019-11-07 PROBLEM — S90.31XA: Status: ACTIVE | Noted: 2019-11-07

## 2019-11-07 LAB
ALBUMIN SERPL BCP-MCNC: 3.3 G/DL (ref 3.2–4.9)
BUN SERPL-MCNC: 26 MG/DL (ref 8–22)
CALCIUM SERPL-MCNC: 9.3 MG/DL (ref 8.5–10.5)
CHLORIDE SERPL-SCNC: 100 MMOL/L (ref 96–112)
CO2 SERPL-SCNC: 34 MMOL/L (ref 20–33)
CREAT SERPL-MCNC: 0.85 MG/DL (ref 0.5–1.4)
GLUCOSE SERPL-MCNC: 97 MG/DL (ref 65–99)
PHOSPHATE SERPL-MCNC: 2.5 MG/DL (ref 2.5–4.5)
POTASSIUM SERPL-SCNC: 3.8 MMOL/L (ref 3.6–5.5)
SODIUM SERPL-SCNC: 140 MMOL/L (ref 135–145)

## 2019-11-07 PROCEDURE — A9270 NON-COVERED ITEM OR SERVICE: HCPCS | Performed by: HOSPITALIST

## 2019-11-07 PROCEDURE — 94640 AIRWAY INHALATION TREATMENT: CPT

## 2019-11-07 PROCEDURE — A9270 NON-COVERED ITEM OR SERVICE: HCPCS | Performed by: INTERNAL MEDICINE

## 2019-11-07 PROCEDURE — 99232 SBSQ HOSP IP/OBS MODERATE 35: CPT | Performed by: INTERNAL MEDICINE

## 2019-11-07 PROCEDURE — 700102 HCHG RX REV CODE 250 W/ 637 OVERRIDE(OP): Performed by: HOSPITALIST

## 2019-11-07 PROCEDURE — 73620 X-RAY EXAM OF FOOT: CPT | Mod: RT

## 2019-11-07 PROCEDURE — A9270 NON-COVERED ITEM OR SERVICE: HCPCS

## 2019-11-07 PROCEDURE — 770020 HCHG ROOM/CARE - TELE (206)

## 2019-11-07 PROCEDURE — 700101 HCHG RX REV CODE 250: Performed by: HOSPITALIST

## 2019-11-07 PROCEDURE — 700101 HCHG RX REV CODE 250: Performed by: INTERNAL MEDICINE

## 2019-11-07 PROCEDURE — 36415 COLL VENOUS BLD VENIPUNCTURE: CPT

## 2019-11-07 PROCEDURE — 700102 HCHG RX REV CODE 250 W/ 637 OVERRIDE(OP): Performed by: INTERNAL MEDICINE

## 2019-11-07 PROCEDURE — 94668 MNPJ CHEST WALL SBSQ: CPT

## 2019-11-07 PROCEDURE — 700102 HCHG RX REV CODE 250 W/ 637 OVERRIDE(OP)

## 2019-11-07 PROCEDURE — 99233 SBSQ HOSP IP/OBS HIGH 50: CPT | Performed by: INTERNAL MEDICINE

## 2019-11-07 PROCEDURE — 700111 HCHG RX REV CODE 636 W/ 250 OVERRIDE (IP): Performed by: INTERNAL MEDICINE

## 2019-11-07 PROCEDURE — 80069 RENAL FUNCTION PANEL: CPT

## 2019-11-07 PROCEDURE — 94669 MECHANICAL CHEST WALL OSCILL: CPT

## 2019-11-07 RX ORDER — NYSTATIN 100000 [USP'U]/G
POWDER TOPICAL 2 TIMES DAILY
Status: DISCONTINUED | OUTPATIENT
Start: 2019-11-07 | End: 2019-11-21 | Stop reason: HOSPADM

## 2019-11-07 RX ADMIN — LIDOCAINE 1 PATCH: 50 PATCH TOPICAL at 14:10

## 2019-11-07 RX ADMIN — RIVAROXABAN 20 MG: 20 TABLET, FILM COATED ORAL at 17:19

## 2019-11-07 RX ADMIN — GUAIFENESIN 600 MG: 600 TABLET, EXTENDED RELEASE ORAL at 05:58

## 2019-11-07 RX ADMIN — IPRATROPIUM BROMIDE AND ALBUTEROL SULFATE 3 ML: .5; 3 SOLUTION RESPIRATORY (INHALATION) at 19:26

## 2019-11-07 RX ADMIN — DIBASIC SODIUM PHOSPHATE, MONOBASIC POTASSIUM PHOSPHATE AND MONOBASIC SODIUM PHOSPHATE 1 TABLET: 852; 155; 130 TABLET ORAL at 06:06

## 2019-11-07 RX ADMIN — PREDNISONE 20 MG: 20 TABLET ORAL at 05:58

## 2019-11-07 RX ADMIN — SENNOSIDES AND DOCUSATE SODIUM 2 TABLET: 8.6; 5 TABLET ORAL at 05:58

## 2019-11-07 RX ADMIN — FUROSEMIDE 40 MG: 40 TABLET ORAL at 05:58

## 2019-11-07 RX ADMIN — METOPROLOL TARTRATE 25 MG: 25 TABLET, FILM COATED ORAL at 05:58

## 2019-11-07 RX ADMIN — POTASSIUM CHLORIDE 20 MEQ: 20 TABLET, EXTENDED RELEASE ORAL at 05:58

## 2019-11-07 RX ADMIN — IPRATROPIUM BROMIDE AND ALBUTEROL SULFATE 3 ML: .5; 3 SOLUTION RESPIRATORY (INHALATION) at 06:15

## 2019-11-07 RX ADMIN — METOPROLOL TARTRATE 25 MG: 25 TABLET, FILM COATED ORAL at 17:19

## 2019-11-07 RX ADMIN — GUAIFENESIN 600 MG: 600 TABLET, EXTENDED RELEASE ORAL at 17:19

## 2019-11-07 RX ADMIN — BUDESONIDE AND FORMOTEROL FUMARATE DIHYDRATE 2 PUFF: 160; 4.5 AEROSOL RESPIRATORY (INHALATION) at 06:17

## 2019-11-07 RX ADMIN — IPRATROPIUM BROMIDE AND ALBUTEROL SULFATE 3 ML: .5; 3 SOLUTION RESPIRATORY (INHALATION) at 10:15

## 2019-11-07 RX ADMIN — NYSTATIN: 100000 POWDER TOPICAL at 17:19

## 2019-11-07 RX ADMIN — BUDESONIDE AND FORMOTEROL FUMARATE DIHYDRATE 2 PUFF: 160; 4.5 AEROSOL RESPIRATORY (INHALATION) at 19:26

## 2019-11-07 RX ADMIN — LEVOTHYROXINE SODIUM 175 MCG: 125 TABLET ORAL at 05:58

## 2019-11-07 RX ADMIN — ACETAMINOPHEN 650 MG: 325 TABLET, FILM COATED ORAL at 17:19

## 2019-11-07 RX ADMIN — DIBASIC SODIUM PHOSPHATE, MONOBASIC POTASSIUM PHOSPHATE AND MONOBASIC SODIUM PHOSPHATE 1 TABLET: 852; 155; 130 TABLET ORAL at 17:18

## 2019-11-07 RX ADMIN — NYSTATIN: 100000 POWDER TOPICAL at 06:06

## 2019-11-07 RX ADMIN — IPRATROPIUM BROMIDE AND ALBUTEROL SULFATE 3 ML: .5; 3 SOLUTION RESPIRATORY (INHALATION) at 14:09

## 2019-11-07 ASSESSMENT — ENCOUNTER SYMPTOMS
SORE THROAT: 0
SINUS PAIN: 0
BACK PAIN: 0
MYALGIAS: 0
NECK PAIN: 0
WEIGHT LOSS: 0
FALLS: 0
HEMOPTYSIS: 0
NAUSEA: 0
BRUISES/BLEEDS EASILY: 0
FOCAL WEAKNESS: 0
DOUBLE VISION: 0
PHOTOPHOBIA: 0
BLURRED VISION: 0
DEPRESSION: 0
SHORTNESS OF BREATH: 1
ABDOMINAL PAIN: 0
TINGLING: 0
HEADACHES: 0
HEARTBURN: 0
BLOOD IN STOOL: 0
COUGH: 1
SENSORY CHANGE: 0
DIAPHORESIS: 0
SPUTUM PRODUCTION: 0
FEVER: 0
CONSTIPATION: 0
NERVOUS/ANXIOUS: 0
VOMITING: 0
DIZZINESS: 0
SPEECH CHANGE: 0
DIARRHEA: 0
STRIDOR: 0
PALPITATIONS: 0
POLYDIPSIA: 0
WHEEZING: 0
CHILLS: 0

## 2019-11-07 ASSESSMENT — COPD QUESTIONNAIRES
DO YOU EVER COUGH UP ANY MUCUS OR PHLEGM?: YES, A FEW DAYS A WEEK OR MONTH
COPD SCREENING SCORE: 8
DURING THE PAST 4 WEEKS HOW MUCH DID YOU FEEL SHORT OF BREATH: MOST  OR ALL OF THE TIME
HAVE YOU SMOKED AT LEAST 100 CIGARETTES IN YOUR ENTIRE LIFE: YES

## 2019-11-07 ASSESSMENT — LIFESTYLE VARIABLES: EVER_SMOKED: YES

## 2019-11-07 NOTE — THERAPY
Physical Therapy Contact Note    Pt declining OOB mobility, stating she needs time; educated on bedrest associated weakness, especially in setting of obesity and limited PLOF; does report she still has goals of being ambulatory and wishes to work with therapy; agreeable tomorrow, time set tentatively for 10am as able to accommodate; will follow.     Deidra Mcdonald, PT, DPT Pager: [phone]

## 2019-11-07 NOTE — DISCHARGE PLANNING
Agency/Facility Name: [facility]  Spoke To: Paula  Outcome: Patient declined as she has no SNF Days left.     Agency/Facility Name: [facility]  Spoke To: Lindy  Outcome: Per Lindy, Referral was not received. I refaxed referral manually

## 2019-11-07 NOTE — PROGRESS NOTES
Assumed care of patient and bedside report received from previous RN. Patient is alert and oriented x 4. VSS. Patient educated on importance of calling, bed controls on, bed locked and in lowest position, call light with patient. Patient is in rhythm: SR. Sleeping at this time

## 2019-11-07 NOTE — PROGRESS NOTES
Assumed care of pt, bedside report received from Chanell ESPINO. Call light within reach. Bed alarm on. Addressed POC with pt, no additional questions at this time.
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
## 2019-11-07 NOTE — DISCHARGE PLANNING
"Anticipated Discharge Disposition: SNF    Action: Met with pt at bedside to discuss discharge planning. Notified her that [facility] is currently not accepting pt's and [place] facilities have declined due to not having any SNF Medicare days left. Inquired if pt would be agreeable to sending referrals to [facility]. Pt declined requesting referrals in [place], [place] stating this is closer to her home. Choice form signed by pt and faxed to [facility].     Pt requested to completed an AD. SW went over AD with her and pt expressed she wants everything done stating \"sometimes doctors are wrong\". Certificate of Competency completed by MD. [facility]gal[facility] request submitted.     Barriers to Discharge: SNF acceptance    Plan: F/U with SNF's   "

## 2019-11-07 NOTE — DISCHARGE PLANNING
Anticipated Discharge Disposition: SNF    Action: Spoke with pt at bedside, obtained SNF choice, Choice faxed to [facility].    Barriers to Discharge: SNF Placement, Transportation, Medical Clearance    Plan: Transfer to SNF, F/U with medical team.

## 2019-11-07 NOTE — CARE PLAN
Problem: Safety  Goal: Will remain free from injury  Outcome: PROGRESSING AS EXPECTED  Goal: Will remain free from falls  Outcome: PROGRESSING AS EXPECTED  Intervention: Implement fall precautions  Flowsheets  Taken 11/6/2019 1900  Environmental Precautions: Treaded Slipper Socks on Patient;Personal Belongings, Wastebasket, Call Bell etc. in Easy Reach;Transferred to Stronger Side;Report Given to Other Health Care Providers Regarding Fall Risk;Bed in Low Position;Mobility Assessed & Appropriate Sign Placed;Communication Sign for Patients & Families  Taken 11/6/2019 2030  Chair/Bed Strip Alarm: Yes - Alarm On     Problem: Bowel/Gastric:  Goal: Will not experience complications related to bowel motility  Outcome: PROGRESSING AS EXPECTED  Intervention: Implement interventions to promote bowel evacuation if inadequate bowel movements in past 48 hours  Note:   Pt has had 1 BM 11/5 and 1 11/6

## 2019-11-07 NOTE — CARE PLAN
Problem: Bowel/Gastric:  Goal: Will not experience complications related to bowel motility  Outcome: PROGRESSING AS EXPECTED     Problem: Mobility  Goal: Risk for activity intolerance will decrease  Outcome: PROGRESSING SLOWER THAN EXPECTED

## 2019-11-07 NOTE — PROGRESS NOTES
[facility] Medicine Daily Progress Note    Date of Service  11/7/2019    Chief Complaint  69 y.o. female COPD, hypothyroidism and morbid obesity admitted 10/28/2019 with respiratory arrest and cardiac arrest.    Hospital Course    69 y.o. female COPD, hypothyroidism and morbid obesity admitted 10/28/2019 with respiratory arrest and cardiac arrest.  Patient was in her usual state of health until 10/28 when her power went out and her oxygen compressor did not work.  She was seen at [facility] who sent her home with an oxygen tank however was found 4 hours later unconscious outside of her house by her neighbor.  The patient was taken to the ER where she was intubated after an ABG showed pH of 7.0 and elevated PCO2.  She subsequently suffered a PEA arrest after intubation.  Patient then transferred to [facility] however developed V. tach arrest and was shocked.  At [facility] she had a central line placed was started on amiodarone and Levophed.  A CTA of the chest was completed which was negative for pulmonary embolus however a small right-sided pneumo as well as an effusion and volume loss was noted.  A CT of the head was negative. She was extubated on 10/30.  She received high flow for a few days then weaned down to 7-10L, she was diuresed.  Pulm recommended nocturnal bipap however patient refused.  She developed atrial flutter.  She was started on xarelto and given amiodarone load.  Given her severe COPD as well as h/o hypthyroidism, did not think she was good long term candidate for amiodarone so was discontinued prior to final load.          Interval Problem Update  - back in sinus rhythm today, dc'ed amio yesterday, rates controlled  - on 3-4L oxygen (desats with certain positions or talking/coughing)  - working on SNF placement, patient doesn't have any SNF days left so making it difficult    Consultants/Specialty  Pulm    Code Status  FULL    Disposition  Likely SNF    Review of Systems  Review of Systems    Constitutional: Positive for malaise/fatigue. Negative for chills and fever.   HENT: Negative for sore throat.    Respiratory: Positive for cough and shortness of breath.    Cardiovascular: Negative for chest pain and palpitations.   Gastrointestinal: Negative for abdominal pain, constipation, diarrhea, nausea and vomiting.   Genitourinary: Negative for dysuria.   Musculoskeletal: Negative for falls.   Neurological: Negative for dizziness and headaches.        Physical Exam  Temp:  [36.1 °C (96.9 °F)-36.8 °C (98.3 °F)] 36.1 °C (96.9 °F)  Pulse:  [68-88] 88  Resp:  [16-18] 16  BP: ()/(60-68) 102/60  SpO2:  [88 %-93 %] 89 %    Physical Exam  Constitutional:       General: She is not in acute distress.     Appearance: She is obese. She is not toxic-appearing or diaphoretic.      Comments: Elderly morbidly obese female, chronically ill appearing   HENT:      Head: Normocephalic and atraumatic.      Nose: No congestion or rhinorrhea.      Mouth/Throat:      Mouth: Mucous membranes are moist.   Eyes:      General: No scleral icterus.     Conjunctiva/sclera: Conjunctivae normal.   Neck:      Musculoskeletal: Normal range of motion and neck supple.   Cardiovascular:      Rate and Rhythm: Normal rate and regular rhythm.      Pulses: Normal pulses.   Pulmonary:      Effort: Pulmonary effort is normal.      Comments: On 4L via NC  Decreased breath sounds (R> L) however overall improved from 11/5  Abdominal:      General: Bowel sounds are normal. There is no distension.      Palpations: Abdomen is soft.      Tenderness: There is no tenderness.   Musculoskeletal:         General: No swelling.   Skin:     Findings: Bruising present.      Comments: Bruising and hematoma on right foot   Neurological:      Mental Status: She is alert and oriented to person, place, and time. Mental status is at baseline.   Psychiatric:         Mood and Affect: Affect is not angry.         Judgment: Judgment is not impulsive or inappropriate.          Fluids    Intake/Output Summary (Last 24 hours) at 11/7/2019 1524  Last data filed at 11/7/2019 0551  Gross per 24 hour   Intake --   Output 2200 ml   Net -2200 ml       Laboratory      Recent Labs     11/06/19  0226 11/07/19  0257   SODIUM 138 140   POTASSIUM 4.3 3.8   CHLORIDE 102 100   CO2 32 34*   GLUCOSE 99 97   BUN 24* 26*   CREATININE 0.85 0.85   CALCIUM 9.5 9.3                   Imaging  DX-CHEST-PORTABLE (1 VIEW)   Final Result      1.  Progressive volume loss in the right lung, with progressive rightward mediastinal shift. Underlying pneumonia and small pleural effusion are possible in the proper clinical settings.   2.  Slight worsening of left basilar atelectasis versus consolidation. No significant left effusion.      DX-CHEST-PORTABLE (1 VIEW)   Final Result      1.  Possible improved consolidation in the right lung versus related to differences in rotation.   2.  Improved interstitial opacity/edema.   3.  Left basilar atelectasis.      DX-CHEST-PORTABLE (1 VIEW)   Final Result      1.  Removal of endotracheal tube and enteric catheters      2.  No other change      DX-CHEST-PORTABLE (1 VIEW)   Final Result      Improved left upper lobe atelectasis and consolidation      Otherwise stable right worse than left consolidation and atelectasis with probable right pleural fluid      Bilateral acute rib fractures      EC-ECHOCARDIOGRAM COMPLETE W/O CONT   Final Result      DX-ABDOMEN FOR TUBE PLACEMENT   Final Result      Feeding tube in place as noted above.      DX-CHEST-PORTABLE (1 VIEW)   Final Result      Improving right upper lobe atelectasis. No significant change otherwise.      DX-ABDOMEN FOR TUBE PLACEMENT   Final Result      Nasogastric tube in place as noted above.      CT-CTA CHEST PULMONARY ARTERY W/ RECONS   Final Result      1.  No evidence of pulmonary emboli   2.  Extensive airspace opacities and atelectasis with marked volume loss in the right lung. A small right pleural effusion is  also present, along with a tiny right pneumothorax.   3.  Patchy airspace opacities in the left upper lobe as well as left lower lobe atelectasis with tiny left pleural effusion.            DX-CHEST-PORTABLE (1 VIEW)   Final Result      Diffuse bilateral interstitial opacities, suggesting pulmonary edema. Additionally there are confluent and linear opacities throughout the right perihilar and upper lung field and in the left base, consistent with a combination of pneumonia and    atelectasis. All of this is considerably worse compared with the prior image.           Assessment/Plan  * Acute on chronic respiratory failure with hypoxia and hypercapnia (HCC)  Assessment & Plan  Due to significant hypercarbia, found to be hypoxic, acidemic, apparently without oxygen in place  Subsequently intubated 10/28/2019, extubated 10/30  Continue respiratory protocol  Goal >86%  Wean oxygen as tolerated  Pulmonary toilet and continue bronchodilators  Lasix 40mg PO daily      Urinary retention  Assessment & Plan  Required I/O cath x 3, gonzalez in place, will re-try today, hesitant about flomax given BP borderline    Respiratory arrest (HCC)- (present on admission)  Assessment & Plan  Secondary to profound hypoxia, respiratory compromise  Negative for pulmonary embolism  Chronically oxygen dependent (on 3L at baseline)  Pulmonary critical care following    Atrial flutter (HCC)  Assessment & Plan  Intermittently in flutter, rate controlled, worry about long-term use with amiodarone given severe COPD and hypothyroidism,  DC amiodarone on 11/6 and can increase beta-blocker if needed  - Lopressor twice daily, anticoagulation with Xarelto    Ventricular tachycardia (HCC)- (present on admission)  Assessment & Plan  S/p amiodarone, dc'ed on 11/6  Continues on metoprolol 25mg BID  Echocardiogram noted with good EF      Traumatic hematoma of foot, right, initial encounter- (present on admission)  Assessment & Plan  Large hematoma noted on my  first day with patient, patient unsure of what happened, no known trauma  - consulted wound who thought it looked more traumatic and recommended xray    Hypophosphatasia  Assessment & Plan  Improved  - 2-->2-->2.5  -K phos daily    Chronic obstructive pulmonary disease with acute exacerbation (HCC)  Assessment & Plan  Ongoing respiratory treatment, inhaled steroids, rhonchi dilators, added Symbicort BID    Closed fracture of multiple ribs of both sides  Assessment & Plan  Acute and previously healed, pain control    Collapse of right lung  Assessment & Plan  Appears to be related to prior injury/trauma    Encephalopathy- (present on admission)  Assessment & Plan  Improving, no definitive anoxic brain injury  Follow clinically  Monitor      Thrombocytopenia (HCC)- (present on admission)  Assessment & Plan  Mild, cont to monitor    Morbid obesity (HCC)- (present on admission)  Assessment & Plan  Encourage weight loss when appropriate    Hypokalemia- (present on admission)  Assessment & Plan  KCl daily    Pulmonary hypertension (HCC)  Assessment & Plan  Likely from chronic pulmonary disease, diuresed appropriately    Left breast mass  Assessment & Plan  Incidental finding on CT  Discussed aspiration/biopsy         VTE prophylaxis: Xaralto

## 2019-11-07 NOTE — DISCHARGE PLANNING
Agency/Facility Name: [facility]  Spoke To: Paula  Outcome: Referral is currently under review.      [facility]  Outcome: Left message in regards for update on patient's referral.

## 2019-11-07 NOTE — DISCHARGE PLANNING
Received Choice form at 8299  Agency/Facility Name: [facility] [place]   Referral sent per Choice form @ 4104

## 2019-11-07 NOTE — DISCHARGE PLANNING
Received Choice form at 1550  Agency/Facility Name: [facility]  Referral sent per Choice form @ 1550     Agency/Facility Name: [facility]  Spoke To: Miko  Outcome: Per Miko, no availabilty and does not know when there will be. She stated patient can potentially be on the waiting list but has to be approved by the DON. DON not in office. Left a .     PH: [phone]    Looked up SNF's in [place], [place]. Only one SNF [facility]    Informed LSW Cristy

## 2019-11-07 NOTE — CARE PLAN
Problem: Bronchoconstriction:  Goal: Improve in air movement and diminished wheezing  Outcome: PROGRESSING AS EXPECTED     Problem: Oxygenation:  Goal: Maintain adequate oxygenation dependent on patient condition  Outcome: PROGRESSING AS EXPECTED     Problem: Bronchopulmonary Hygiene:  Goal: Increase mobilization of retained secretions  Outcome: PROGRESSING AS EXPECTED    Duonebs QID per home routine per patient. Symbicort BID.  4L O2 keeping sats > 86% per MD order

## 2019-11-07 NOTE — CARE PLAN
Respiratory Update    Treatment modality: SVN, Flutter  Frequency: DUO QID    Pt tolerating current treatments well with no adverse reactions.

## 2019-11-08 ENCOUNTER — APPOINTMENT (OUTPATIENT)
Dept: RADIOLOGY | Facility: MEDICAL CENTER | Age: 69
DRG: 208 | End: 2019-11-08
Attending: INTERNAL MEDICINE
Payer: MEDICARE

## 2019-11-08 PROCEDURE — 700102 HCHG RX REV CODE 250 W/ 637 OVERRIDE(OP): Performed by: INTERNAL MEDICINE

## 2019-11-08 PROCEDURE — 700102 HCHG RX REV CODE 250 W/ 637 OVERRIDE(OP): Performed by: HOSPITALIST

## 2019-11-08 PROCEDURE — 94668 MNPJ CHEST WALL SBSQ: CPT

## 2019-11-08 PROCEDURE — 51798 US URINE CAPACITY MEASURE: CPT

## 2019-11-08 PROCEDURE — A9270 NON-COVERED ITEM OR SERVICE: HCPCS | Performed by: INTERNAL MEDICINE

## 2019-11-08 PROCEDURE — 700101 HCHG RX REV CODE 250: Performed by: INTERNAL MEDICINE

## 2019-11-08 PROCEDURE — 99232 SBSQ HOSP IP/OBS MODERATE 35: CPT | Performed by: INTERNAL MEDICINE

## 2019-11-08 PROCEDURE — 94669 MECHANICAL CHEST WALL OSCILL: CPT

## 2019-11-08 PROCEDURE — 97112 NEUROMUSCULAR REEDUCATION: CPT

## 2019-11-08 PROCEDURE — 94640 AIRWAY INHALATION TREATMENT: CPT

## 2019-11-08 PROCEDURE — 97530 THERAPEUTIC ACTIVITIES: CPT

## 2019-11-08 PROCEDURE — 97535 SELF CARE MNGMENT TRAINING: CPT

## 2019-11-08 PROCEDURE — 700111 HCHG RX REV CODE 636 W/ 250 OVERRIDE (IP): Performed by: INTERNAL MEDICINE

## 2019-11-08 PROCEDURE — A9270 NON-COVERED ITEM OR SERVICE: HCPCS | Performed by: HOSPITALIST

## 2019-11-08 PROCEDURE — 770020 HCHG ROOM/CARE - TELE (206)

## 2019-11-08 PROCEDURE — 700101 HCHG RX REV CODE 250: Performed by: HOSPITALIST

## 2019-11-08 RX ADMIN — IPRATROPIUM BROMIDE AND ALBUTEROL SULFATE 3 ML: .5; 3 SOLUTION RESPIRATORY (INHALATION) at 14:12

## 2019-11-08 RX ADMIN — METOPROLOL TARTRATE 25 MG: 25 TABLET, FILM COATED ORAL at 06:33

## 2019-11-08 RX ADMIN — IPRATROPIUM BROMIDE AND ALBUTEROL SULFATE 3 ML: .5; 3 SOLUTION RESPIRATORY (INHALATION) at 18:13

## 2019-11-08 RX ADMIN — GUAIFENESIN 600 MG: 600 TABLET, EXTENDED RELEASE ORAL at 06:33

## 2019-11-08 RX ADMIN — GUAIFENESIN 600 MG: 600 TABLET, EXTENDED RELEASE ORAL at 16:26

## 2019-11-08 RX ADMIN — DIBASIC SODIUM PHOSPHATE, MONOBASIC POTASSIUM PHOSPHATE AND MONOBASIC SODIUM PHOSPHATE 1 TABLET: 852; 155; 130 TABLET ORAL at 16:25

## 2019-11-08 RX ADMIN — IPRATROPIUM BROMIDE AND ALBUTEROL SULFATE 3 ML: .5; 3 SOLUTION RESPIRATORY (INHALATION) at 10:50

## 2019-11-08 RX ADMIN — PREDNISONE 20 MG: 20 TABLET ORAL at 06:33

## 2019-11-08 RX ADMIN — POTASSIUM CHLORIDE 20 MEQ: 20 TABLET, EXTENDED RELEASE ORAL at 06:33

## 2019-11-08 RX ADMIN — METOPROLOL TARTRATE 25 MG: 25 TABLET, FILM COATED ORAL at 16:26

## 2019-11-08 RX ADMIN — FUROSEMIDE 40 MG: 40 TABLET ORAL at 06:34

## 2019-11-08 RX ADMIN — BUDESONIDE AND FORMOTEROL FUMARATE DIHYDRATE 2 PUFF: 160; 4.5 AEROSOL RESPIRATORY (INHALATION) at 18:13

## 2019-11-08 RX ADMIN — NYSTATIN: 100000 POWDER TOPICAL at 16:33

## 2019-11-08 RX ADMIN — LEVOTHYROXINE SODIUM 175 MCG: 125 TABLET ORAL at 06:33

## 2019-11-08 RX ADMIN — IPRATROPIUM BROMIDE AND ALBUTEROL SULFATE 3 ML: .5; 3 SOLUTION RESPIRATORY (INHALATION) at 07:23

## 2019-11-08 RX ADMIN — LIDOCAINE 1 PATCH: 50 PATCH TOPICAL at 12:39

## 2019-11-08 RX ADMIN — BUDESONIDE AND FORMOTEROL FUMARATE DIHYDRATE 2 PUFF: 160; 4.5 AEROSOL RESPIRATORY (INHALATION) at 07:23

## 2019-11-08 RX ADMIN — RIVAROXABAN 20 MG: 20 TABLET, FILM COATED ORAL at 16:26

## 2019-11-08 RX ADMIN — NYSTATIN: 100000 POWDER TOPICAL at 06:37

## 2019-11-08 RX ADMIN — DIBASIC SODIUM PHOSPHATE, MONOBASIC POTASSIUM PHOSPHATE AND MONOBASIC SODIUM PHOSPHATE 1 TABLET: 852; 155; 130 TABLET ORAL at 06:29

## 2019-11-08 ASSESSMENT — COGNITIVE AND FUNCTIONAL STATUS - GENERAL
SUGGESTED CMS G CODE MODIFIER DAILY ACTIVITY: CK
EATING MEALS: A LITTLE
DAILY ACTIVITIY SCORE: 15
DRESSING REGULAR LOWER BODY CLOTHING: A LOT
DRESSING REGULAR UPPER BODY CLOTHING: A LITTLE
TOILETING: A LOT
HELP NEEDED FOR BATHING: A LOT
PERSONAL GROOMING: A LITTLE

## 2019-11-08 ASSESSMENT — ENCOUNTER SYMPTOMS
BLOOD IN STOOL: 0
DIARRHEA: 0
COUGH: 1
DOUBLE VISION: 0
MYALGIAS: 0
SHORTNESS OF BREATH: 1
FALLS: 0
FEVER: 0
HEADACHES: 0
DIZZINESS: 0
PHOTOPHOBIA: 0
SENSORY CHANGE: 0
DEPRESSION: 0
STRIDOR: 0
SPEECH CHANGE: 0
FOCAL WEAKNESS: 0
VOMITING: 0
CONSTIPATION: 0
DIAPHORESIS: 0
NAUSEA: 0
PALPITATIONS: 0
TINGLING: 0
SINUS PAIN: 0
POLYDIPSIA: 0
BRUISES/BLEEDS EASILY: 0
HEARTBURN: 0
WEIGHT LOSS: 0
BACK PAIN: 0
SPUTUM PRODUCTION: 0
SORE THROAT: 0
NECK PAIN: 0
NERVOUS/ANXIOUS: 0
WHEEZING: 0
BLURRED VISION: 0
HEMOPTYSIS: 0
ABDOMINAL PAIN: 0
CHILLS: 0

## 2019-11-08 NOTE — DISCHARGE PLANNING
Anticipated Discharge Disposition: SNF    Action: Received phone call from pt's friend Lizbeth. She states pt called her and asked if she would assist with SNF selection. Discussed [facility] and [facility] denials. Lizbeth requested referrals to [facility], [facility], and [facility]. Met with pt at bedside and confirmed she's agreeable to Lizbeth's selections.   Choice form completed and faxed to [facility]    Lizbeth also requested information regarding AD completion. Notified her AD is pending notarization, however isn't effective until pt is unable to make her own medical decisions. Lizbeth voiced understanding     Barriers to Discharge: SNF acceptance    Plan: F/U with SNF    Update- AD notarized and scanned into pt's chart

## 2019-11-08 NOTE — DISCHARGE PLANNING
Agency/Facility Name: [facility], [facility], and [facility]  Outcome: Fax timed out. Manually faxed referral.

## 2019-11-08 NOTE — PROGRESS NOTES
Oriented x4.  Incontinent of BM at this time. Congested cough, left upper lobe crackles, rest of lung fields diminished. Generalized pain.  Dressing to RLE clean/dry/intact. Bruising to bilateral feet, right worse than left. Pt. has poor motivation.

## 2019-11-08 NOTE — DISCHARGE PLANNING
Agency/Facility Name: [facility]  Spoke To: Nursing station  Outcome: The admissions coordinator is off today. Left message for a call back.    Agency/Facility Name: [facility]  Spoke To: Alicia  Outcome: Unable to locate referral. I refaxed referral. FAX # [phone]    Agency/Facility Name: [facility]  Outcome: Left a voicemail in regards to the patients referral status.       .
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
## 2019-11-08 NOTE — CARE PLAN
Problem: Oxygenation:  Goal: Maintain adequate oxygenation dependent on patient condition  Outcome: PROGRESSING AS EXPECTED  Pt on 4L NC.      Problem: Bronchopulmonary Hygiene:  Goal: Increase mobilization of retained secretions  Outcome: PROGRESSING AS EXPECTED     Flutter w/ Duo QID  Symbicort BID

## 2019-11-08 NOTE — DISCHARGE PLANNING
Received Choice form at 1607  Agency/Facility Name:[facility], [facility]  Referral sent per Choice form @ 1582
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
## 2019-11-08 NOTE — THERAPY
"Occupational Therapy Treatment completed with focus on activity tolerance, bed mob, xfer training (sit to stand only). Pt irritable but agreeable to participate.   Functional Status: Pt is min a supine to sit, min a sit to stand, min a scoot to edge of bed, max a reposition in bed. Pt limited by pain and fatigue.   Plan of Care: Continue POC.   Discharge Recommendations:  Equipment Will Continue to Assess for Equipment Needs. Post-acute therapy Discharge to a transitional care facility for continued skilled therapy services.    See \"Rehab Therapy-Acute\" Patient Summary Report for complete documentation.   "

## 2019-11-09 ENCOUNTER — APPOINTMENT (OUTPATIENT)
Dept: RADIOLOGY | Facility: MEDICAL CENTER | Age: 69
DRG: 208 | End: 2019-11-09
Attending: INTERNAL MEDICINE
Payer: MEDICARE

## 2019-11-09 LAB — PHOSPHATE SERPL-MCNC: 3.4 MG/DL (ref 2.5–4.5)

## 2019-11-09 PROCEDURE — A9270 NON-COVERED ITEM OR SERVICE: HCPCS | Performed by: HOSPITALIST

## 2019-11-09 PROCEDURE — 770020 HCHG ROOM/CARE - TELE (206)

## 2019-11-09 PROCEDURE — A9576 INJ PROHANCE MULTIPACK: HCPCS | Performed by: INTERNAL MEDICINE

## 2019-11-09 PROCEDURE — 700105 HCHG RX REV CODE 258

## 2019-11-09 PROCEDURE — 84100 ASSAY OF PHOSPHORUS: CPT

## 2019-11-09 PROCEDURE — 94668 MNPJ CHEST WALL SBSQ: CPT

## 2019-11-09 PROCEDURE — 94640 AIRWAY INHALATION TREATMENT: CPT

## 2019-11-09 PROCEDURE — A9270 NON-COVERED ITEM OR SERVICE: HCPCS | Performed by: INTERNAL MEDICINE

## 2019-11-09 PROCEDURE — 700102 HCHG RX REV CODE 250 W/ 637 OVERRIDE(OP): Performed by: INTERNAL MEDICINE

## 2019-11-09 PROCEDURE — 700117 HCHG RX CONTRAST REV CODE 255: Performed by: INTERNAL MEDICINE

## 2019-11-09 PROCEDURE — 99232 SBSQ HOSP IP/OBS MODERATE 35: CPT | Performed by: INTERNAL MEDICINE

## 2019-11-09 PROCEDURE — 73720 MRI LWR EXTREMITY W/O&W/DYE: CPT | Mod: RT

## 2019-11-09 PROCEDURE — 700111 HCHG RX REV CODE 636 W/ 250 OVERRIDE (IP): Performed by: INTERNAL MEDICINE

## 2019-11-09 PROCEDURE — 94669 MECHANICAL CHEST WALL OSCILL: CPT

## 2019-11-09 PROCEDURE — 700101 HCHG RX REV CODE 250: Performed by: INTERNAL MEDICINE

## 2019-11-09 PROCEDURE — 700102 HCHG RX REV CODE 250 W/ 637 OVERRIDE(OP): Performed by: HOSPITALIST

## 2019-11-09 PROCEDURE — 700101 HCHG RX REV CODE 250: Performed by: HOSPITALIST

## 2019-11-09 PROCEDURE — 36415 COLL VENOUS BLD VENIPUNCTURE: CPT

## 2019-11-09 RX ORDER — SODIUM CHLORIDE 9 MG/ML
INJECTION, SOLUTION INTRAVENOUS
Status: COMPLETED
Start: 2019-11-09 | End: 2019-11-09

## 2019-11-09 RX ORDER — AMOXICILLIN 250 MG
2 CAPSULE ORAL 2 TIMES DAILY PRN
Status: DISCONTINUED | OUTPATIENT
Start: 2019-11-09 | End: 2019-11-21 | Stop reason: HOSPADM

## 2019-11-09 RX ORDER — POLYETHYLENE GLYCOL 3350 17 G/17G
1 POWDER, FOR SOLUTION ORAL
Status: DISCONTINUED | OUTPATIENT
Start: 2019-11-09 | End: 2019-11-21 | Stop reason: HOSPADM

## 2019-11-09 RX ORDER — SODIUM CHLORIDE 9 MG/ML
250 INJECTION, SOLUTION INTRAVENOUS ONCE
Status: COMPLETED | OUTPATIENT
Start: 2019-11-09 | End: 2019-11-09

## 2019-11-09 RX ORDER — BISACODYL 10 MG
10 SUPPOSITORY, RECTAL RECTAL
Status: DISCONTINUED | OUTPATIENT
Start: 2019-11-09 | End: 2019-11-21 | Stop reason: HOSPADM

## 2019-11-09 RX ADMIN — ACETAMINOPHEN 650 MG: 325 TABLET, FILM COATED ORAL at 06:23

## 2019-11-09 RX ADMIN — BUDESONIDE AND FORMOTEROL FUMARATE DIHYDRATE 2 PUFF: 160; 4.5 AEROSOL RESPIRATORY (INHALATION) at 06:28

## 2019-11-09 RX ADMIN — SODIUM CHLORIDE 250 ML: 9 INJECTION, SOLUTION INTRAVENOUS at 13:26

## 2019-11-09 RX ADMIN — IPRATROPIUM BROMIDE AND ALBUTEROL SULFATE 3 ML: .5; 3 SOLUTION RESPIRATORY (INHALATION) at 18:53

## 2019-11-09 RX ADMIN — POTASSIUM CHLORIDE 20 MEQ: 20 TABLET, EXTENDED RELEASE ORAL at 05:26

## 2019-11-09 RX ADMIN — NYSTATIN: 100000 POWDER TOPICAL at 05:25

## 2019-11-09 RX ADMIN — DIBASIC SODIUM PHOSPHATE, MONOBASIC POTASSIUM PHOSPHATE AND MONOBASIC SODIUM PHOSPHATE 1 TABLET: 852; 155; 130 TABLET ORAL at 17:24

## 2019-11-09 RX ADMIN — GUAIFENESIN 600 MG: 600 TABLET, EXTENDED RELEASE ORAL at 05:25

## 2019-11-09 RX ADMIN — FUROSEMIDE 40 MG: 40 TABLET ORAL at 05:25

## 2019-11-09 RX ADMIN — LEVOTHYROXINE SODIUM 175 MCG: 125 TABLET ORAL at 05:25

## 2019-11-09 RX ADMIN — GUAIFENESIN 600 MG: 600 TABLET, EXTENDED RELEASE ORAL at 17:25

## 2019-11-09 RX ADMIN — METOPROLOL TARTRATE 25 MG: 25 TABLET, FILM COATED ORAL at 17:26

## 2019-11-09 RX ADMIN — DIBASIC SODIUM PHOSPHATE, MONOBASIC POTASSIUM PHOSPHATE AND MONOBASIC SODIUM PHOSPHATE 1 TABLET: 852; 155; 130 TABLET ORAL at 05:25

## 2019-11-09 RX ADMIN — RIVAROXABAN 20 MG: 20 TABLET, FILM COATED ORAL at 17:26

## 2019-11-09 RX ADMIN — LIDOCAINE 1 PATCH: 50 PATCH TOPICAL at 14:03

## 2019-11-09 RX ADMIN — NYSTATIN: 100000 POWDER TOPICAL at 17:28

## 2019-11-09 RX ADMIN — GADOTERIDOL 25 ML: 279.3 INJECTION, SOLUTION INTRAVENOUS at 08:43

## 2019-11-09 RX ADMIN — METOPROLOL TARTRATE 25 MG: 25 TABLET, FILM COATED ORAL at 05:25

## 2019-11-09 RX ADMIN — PREDNISONE 10 MG: 10 TABLET ORAL at 05:25

## 2019-11-09 RX ADMIN — IPRATROPIUM BROMIDE AND ALBUTEROL SULFATE 3 ML: .5; 3 SOLUTION RESPIRATORY (INHALATION) at 06:27

## 2019-11-09 ASSESSMENT — ENCOUNTER SYMPTOMS
NECK PAIN: 0
DOUBLE VISION: 0
FALLS: 0
ABDOMINAL PAIN: 0
WHEEZING: 0
SINUS PAIN: 0
COUGH: 1
MYALGIAS: 0
DIARRHEA: 0
HEADACHES: 0
HEARTBURN: 0
PHOTOPHOBIA: 0
SHORTNESS OF BREATH: 1
SENSORY CHANGE: 0
VOMITING: 0
TINGLING: 0
DIAPHORESIS: 0
NERVOUS/ANXIOUS: 0
DIZZINESS: 0
CONSTIPATION: 0
HEMOPTYSIS: 0
SPEECH CHANGE: 0
DEPRESSION: 0
POLYDIPSIA: 0
PALPITATIONS: 0
NAUSEA: 0
BLURRED VISION: 0
STRIDOR: 0
FEVER: 0
FOCAL WEAKNESS: 0
SORE THROAT: 0
CHILLS: 0
BRUISES/BLEEDS EASILY: 0
BLOOD IN STOOL: 0
BACK PAIN: 0
SPUTUM PRODUCTION: 0
WEAKNESS: 1
WEIGHT LOSS: 0

## 2019-11-09 NOTE — PROGRESS NOTES
Received pt report from day RN Reji.  Pt awake, alert, oriented X 4.  Pt resting in bed.  Bed in low locked position, call bell at the bedside, tray table & personal belongings within reach.  Non-skid footwear intact.  White board updated to reflect plan of care for current shift.

## 2019-11-09 NOTE — PROGRESS NOTES
[facility] Medicine Daily Progress Note    Date of Service  11/8/2019    Chief Complaint  69 y.o. female COPD, hypothyroidism and morbid obesity admitted 10/28/2019 with respiratory arrest and cardiac arrest.    Hospital Course    69 y.o. female COPD, hypothyroidism and morbid obesity admitted 10/28/2019 with respiratory arrest and cardiac arrest.  Patient was in her usual state of health until 10/28 when her power went out and her oxygen compressor did not work.  She was seen at [facility] who sent her home with an oxygen tank however was found 4 hours later unconscious outside of her house by her neighbor.  The patient was taken to the ER where she was intubated after an ABG showed pH of 7.0 and elevated PCO2.  She subsequently suffered a PEA arrest after intubation.  Patient then transferred to [facility] however developed V. tach arrest and was shocked.  At [facility] she had a central line placed was started on amiodarone and Levophed.  A CTA of the chest was completed which was negative for pulmonary embolus however a small right-sided pneumo as well as an effusion and volume loss was noted.  A CT of the head was negative. She was extubated on 10/30.  She received high flow for a few days then weaned down to 7-10L, she was diuresed.  Pulm recommended nocturnal bipap however patient refused.  She developed atrial flutter.  She was started on xarelto and given amiodarone load.  Given her severe COPD as well as h/o hypthyroidism, did not think she was good long term candidate for amiodarone so was discontinued prior to final load.          Interval Problem Update  - on 4L via NC  - xray of foot (right) yesterday showed soft tissue swelling with subacute appearing fractures involving the neck of the fourth and fifth metatarsals with possible bone resorption which could be due to fractures or osteomyelitis.   - working on SNF placement    Consultants/Specialty  Pulm    Code Status  FULL    Disposition  Likely  SNF    Review of Systems  Review of Systems   Constitutional: Positive for malaise/fatigue. Negative for chills and fever.   HENT: Negative for sore throat.    Respiratory: Positive for cough and shortness of breath.    Cardiovascular: Negative for chest pain and palpitations.   Gastrointestinal: Negative for abdominal pain, constipation, diarrhea, nausea and vomiting.   Genitourinary: Negative for dysuria.   Musculoskeletal: Negative for falls.   Neurological: Negative for dizziness and headaches.        Physical Exam  Temp:  [36.2 °C (97.2 °F)-36.9 °C (98.4 °F)] 36.2 °C (97.2 °F)  Pulse:  [75-90] 77  Resp:  [16-20] 16  BP: (101-108)/(49-71) 104/49  SpO2:  [90 %-92 %] 91 %    Physical Exam  Constitutional:       General: She is not in acute distress.     Appearance: She is obese. She is not toxic-appearing or diaphoretic.      Comments: Elderly morbidly obese female, chronically ill appearing   HENT:      Head: Normocephalic and atraumatic.      Nose: No congestion or rhinorrhea.      Mouth/Throat:      Mouth: Mucous membranes are moist.   Eyes:      General: No scleral icterus.     Conjunctiva/sclera: Conjunctivae normal.   Neck:      Musculoskeletal: Normal range of motion and neck supple.   Cardiovascular:      Rate and Rhythm: Normal rate and regular rhythm.      Pulses: Normal pulses.   Pulmonary:      Effort: Pulmonary effort is normal.      Comments: On 4L via NC  Decreased breath sounds (R> L) however overall improved from 11/5  Abdominal:      General: Bowel sounds are normal. There is no distension.      Palpations: Abdomen is soft.      Tenderness: There is no tenderness.   Musculoskeletal:         General: No swelling.   Skin:     Findings: Bruising present.      Comments: Bruising and hematoma on right foot   Neurological:      Mental Status: She is alert and oriented to person, place, and time. Mental status is at baseline.   Psychiatric:         Mood and Affect: Affect is not angry.         Judgment:  Judgment is not impulsive or inappropriate.         Fluids    Intake/Output Summary (Last 24 hours) at 11/8/2019 1714  Last data filed at 11/7/2019 1800  Gross per 24 hour   Intake 360 ml   Output --   Net 360 ml       Laboratory      Recent Labs     11/06/19  0226 11/07/19  0257   SODIUM 138 140   POTASSIUM 4.3 3.8   CHLORIDE 102 100   CO2 32 34*   GLUCOSE 99 97   BUN 24* 26*   CREATININE 0.85 0.85   CALCIUM 9.5 9.3                   Imaging  DX-FOOT-2- RIGHT   Final Result      Soft tissue swelling with subacute appearing fractures involving the necks of the fourth and fifth metatarsals with possible bone resorption which could be due to fractures or osteomyelitis.      DX-CHEST-PORTABLE (1 VIEW)   Final Result      1.  Progressive volume loss in the right lung, with progressive rightward mediastinal shift. Underlying pneumonia and small pleural effusion are possible in the proper clinical settings.   2.  Slight worsening of left basilar atelectasis versus consolidation. No significant left effusion.      DX-CHEST-PORTABLE (1 VIEW)   Final Result      1.  Possible improved consolidation in the right lung versus related to differences in rotation.   2.  Improved interstitial opacity/edema.   3.  Left basilar atelectasis.      DX-CHEST-PORTABLE (1 VIEW)   Final Result      1.  Removal of endotracheal tube and enteric catheters      2.  No other change      DX-CHEST-PORTABLE (1 VIEW)   Final Result      Improved left upper lobe atelectasis and consolidation      Otherwise stable right worse than left consolidation and atelectasis with probable right pleural fluid      Bilateral acute rib fractures      EC-ECHOCARDIOGRAM COMPLETE W/O CONT   Final Result      DX-ABDOMEN FOR TUBE PLACEMENT   Final Result      Feeding tube in place as noted above.      DX-CHEST-PORTABLE (1 VIEW)   Final Result      Improving right upper lobe atelectasis. No significant change otherwise.      DX-ABDOMEN FOR TUBE PLACEMENT   Final Result       Nasogastric tube in place as noted above.      CT-CTA CHEST PULMONARY ARTERY W/ RECONS   Final Result      1.  No evidence of pulmonary emboli   2.  Extensive airspace opacities and atelectasis with marked volume loss in the right lung. A small right pleural effusion is also present, along with a tiny right pneumothorax.   3.  Patchy airspace opacities in the left upper lobe as well as left lower lobe atelectasis with tiny left pleural effusion.            DX-CHEST-PORTABLE (1 VIEW)   Final Result      Diffuse bilateral interstitial opacities, suggesting pulmonary edema. Additionally there are confluent and linear opacities throughout the right perihilar and upper lung field and in the left base, consistent with a combination of pneumonia and    atelectasis. All of this is considerably worse compared with the prior image.      MR-FOOT-WITH RIGHT    (Results Pending)        Assessment/Plan  * Acute on chronic respiratory failure with hypoxia and hypercapnia (HCC)  Assessment & Plan  Due to significant hypercarbia, found to be hypoxic, acidemic, apparently without oxygen in place  Subsequently intubated 10/28/2019, extubated 10/30  Continue respiratory protocol  Goal >86%  Wean oxygen as tolerated  Pulmonary toilet and continue bronchodilators  Lasix 40mg PO daily      Urinary retention  Assessment & Plan  Required I/O cath x 3, gonzalez placed but removed on 11/7, urinating without retention    Respiratory arrest (HCC)- (present on admission)  Assessment & Plan  Secondary to profound hypoxia, respiratory compromise  Negative for pulmonary embolism  Chronically oxygen dependent (on 3L at baseline)  Pulmonary critical care following    Atrial flutter (HCC)  Assessment & Plan  Intermittently in flutter, rate controlled, worry about long-term use with amiodarone given severe COPD and hypothyroidism,  DC amiodarone on 11/6 and can increase beta-blocker if needed  - Lopressor twice daily, anticoagulation with  Xarelto    Ventricular tachycardia (HCC)- (present on admission)  Assessment & Plan  S/p amiodarone, deepak'ed on 11/6  Continues on metoprolol 25mg BID  Echocardiogram noted with good EF      Traumatic hematoma of foot, right, initial encounter- (present on admission)  Assessment & Plan  Large hematoma noted on my first day with patient, patient unsure of what happened, no known trauma  - consulted wound who thought it looked more traumatic   - xray showed subacute fractures of toes and abnormalities that could be d/t fracture vs osteo, getting MRI today    Hypophosphatasia  Assessment & Plan  Improved  - 2-->2-->2.5  -K phos daily    Chronic obstructive pulmonary disease with acute exacerbation (HCC)  Assessment & Plan  Ongoing respiratory treatment, inhaled steroids, rhonchi dilators, added Symbicort BID    Closed fracture of multiple ribs of both sides  Assessment & Plan  Acute and previously healed, pain control    Collapse of right lung  Assessment & Plan  Appears to be related to prior injury/trauma    Encephalopathy- (present on admission)  Assessment & Plan  Resolved, no definitive anoxic brain injury  Follow clinically  Monitor      Thrombocytopenia (HCC)- (present on admission)  Assessment & Plan  Mild, cont to monitor    Morbid obesity (HCC)- (present on admission)  Assessment & Plan  Encourage weight loss when appropriate    Hypokalemia- (present on admission)  Assessment & Plan  KCl daily    Pulmonary hypertension (HCC)  Assessment & Plan  Likely from chronic pulmonary disease, diuresed appropriately    Left breast mass  Assessment & Plan  Incidental finding on CT  Discussed aspiration/biopsy         VTE prophylaxis: Xaralto

## 2019-11-09 NOTE — CARE PLAN
Problem: Safety  Goal: Will remain free from injury  Outcome: PROGRESSING AS EXPECTED  Intervention: Educate patient and significant other/support system about adaptive mobility strategies and safe transfers  Note:   Fall precautions in place. Bed in lowest position. Non-skid socks in place. Personal possessions within reach. Mobility sign on door. Bed-alarm on. Call light within reach. Pt educated regarding fall prevention and states understanding.    Goal: Will remain free from falls  Outcome: PROGRESSING AS EXPECTED  Intervention: Implement fall precautions  Flowsheets  Taken 11/9/2019 0920  Bed Alarm: Yes - Alarm On  Chair/Bed Strip Alarm: Yes - Alarm On  Taken 11/9/2019 0800  Environmental Precautions: Treaded Slipper Socks on Patient;Personal Belongings, Wastebasket, Call Bell etc. in Easy Reach;Transferred to Stronger Side;Report Given to Other Health Care Providers Regarding Fall Risk;Bed in Low Position;Communication Sign for Patients & Families;Mobility Assessed & Appropriate Sign Placed  Note:   Fall precautions in place. Bed in lowest position. Non-skid socks in place. Personal possessions within reach. Mobility sign on door. Bed-alarm on. Call light within reach. Pt educated regarding fall prevention and states understanding.       Problem: Knowledge Deficit  Goal: Knowledge of disease process/condition, treatment plan, diagnostic tests, and medications will improve  Outcome: PROGRESSING AS EXPECTED  Intervention: Explain information regarding disease process/condition, treatment plan, diagnostic tests, and medications and document in education  Note:   Pt educated regarding plan of care and medications. All questions answered.    Goal: Knowledge of the prescribed therapeutic regimen will improve  Outcome: PROGRESSING AS EXPECTED  Intervention: Discuss information regarding therpeutic regimen and document in education  Note:   Pt educated regarding plan of care and medications. All questions answered.        Problem: Respiratory:  Goal: Respiratory status will improve  Outcome: PROGRESSING AS EXPECTED  Intervention: Administer and titrate oxygen therapy  Flowsheets (Taken 11/9/2019 0727 by Albina Trejo, C.N.A.)  O2 (LPM): 3

## 2019-11-09 NOTE — PROGRESS NOTES
Bedside report received. POC discussed with pt; all questions answered at this time. Patient needs addressed.

## 2019-11-09 NOTE — CARE PLAN
Problem: Bronchoconstriction:  Goal: Improve in air movement and diminished wheezing  Outcome: PROGRESSING AS EXPECTED   Duoneb QID  Symbicort BID  Flutter QID
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
## 2019-11-10 LAB
ANION GAP SERPL CALC-SCNC: 7 MMOL/L (ref 0–11.9)
BUN SERPL-MCNC: 28 MG/DL (ref 8–22)
CALCIUM SERPL-MCNC: 10.1 MG/DL (ref 8.5–10.5)
CHLORIDE SERPL-SCNC: 102 MMOL/L (ref 96–112)
CO2 SERPL-SCNC: 37 MMOL/L (ref 20–33)
CREAT SERPL-MCNC: 0.88 MG/DL (ref 0.5–1.4)
ERYTHROCYTE [DISTWIDTH] IN BLOOD BY AUTOMATED COUNT: 51.3 FL (ref 35.9–50)
GLUCOSE SERPL-MCNC: 103 MG/DL (ref 65–99)
HCT VFR BLD AUTO: 46.7 % (ref 37–47)
HGB BLD-MCNC: 14.4 G/DL (ref 12–16)
MAGNESIUM SERPL-MCNC: 1.8 MG/DL (ref 1.5–2.5)
MCH RBC QN AUTO: 31.6 PG (ref 27–33)
MCHC RBC AUTO-ENTMCNC: 30.8 G/DL (ref 33.6–35)
MCV RBC AUTO: 102.4 FL (ref 81.4–97.8)
PLATELET # BLD AUTO: 191 K/UL (ref 164–446)
PMV BLD AUTO: 9.8 FL (ref 9–12.9)
POTASSIUM SERPL-SCNC: 3.4 MMOL/L (ref 3.6–5.5)
RBC # BLD AUTO: 4.56 M/UL (ref 4.2–5.4)
SODIUM SERPL-SCNC: 146 MMOL/L (ref 135–145)
WBC # BLD AUTO: 7.1 K/UL (ref 4.8–10.8)

## 2019-11-10 PROCEDURE — 700102 HCHG RX REV CODE 250 W/ 637 OVERRIDE(OP): Performed by: INTERNAL MEDICINE

## 2019-11-10 PROCEDURE — 770020 HCHG ROOM/CARE - TELE (206)

## 2019-11-10 PROCEDURE — A9270 NON-COVERED ITEM OR SERVICE: HCPCS | Performed by: INTERNAL MEDICINE

## 2019-11-10 PROCEDURE — 80048 BASIC METABOLIC PNL TOTAL CA: CPT

## 2019-11-10 PROCEDURE — 94669 MECHANICAL CHEST WALL OSCILL: CPT

## 2019-11-10 PROCEDURE — A9270 NON-COVERED ITEM OR SERVICE: HCPCS | Performed by: HOSPITALIST

## 2019-11-10 PROCEDURE — 700101 HCHG RX REV CODE 250: Performed by: HOSPITALIST

## 2019-11-10 PROCEDURE — 700111 HCHG RX REV CODE 636 W/ 250 OVERRIDE (IP): Performed by: INTERNAL MEDICINE

## 2019-11-10 PROCEDURE — 99233 SBSQ HOSP IP/OBS HIGH 50: CPT | Performed by: INTERNAL MEDICINE

## 2019-11-10 PROCEDURE — 85027 COMPLETE CBC AUTOMATED: CPT

## 2019-11-10 PROCEDURE — 83735 ASSAY OF MAGNESIUM: CPT

## 2019-11-10 PROCEDURE — 36415 COLL VENOUS BLD VENIPUNCTURE: CPT

## 2019-11-10 PROCEDURE — 94760 N-INVAS EAR/PLS OXIMETRY 1: CPT

## 2019-11-10 PROCEDURE — 94640 AIRWAY INHALATION TREATMENT: CPT

## 2019-11-10 PROCEDURE — 700101 HCHG RX REV CODE 250: Performed by: INTERNAL MEDICINE

## 2019-11-10 PROCEDURE — 94668 MNPJ CHEST WALL SBSQ: CPT

## 2019-11-10 PROCEDURE — 700102 HCHG RX REV CODE 250 W/ 637 OVERRIDE(OP): Performed by: HOSPITALIST

## 2019-11-10 PROCEDURE — 99232 SBSQ HOSP IP/OBS MODERATE 35: CPT | Performed by: INTERNAL MEDICINE

## 2019-11-10 RX ORDER — POTASSIUM CHLORIDE 20 MEQ/1
40 TABLET, EXTENDED RELEASE ORAL 2 TIMES DAILY
Status: DISPENSED | OUTPATIENT
Start: 2019-11-10 | End: 2019-11-11

## 2019-11-10 RX ORDER — MAGNESIUM SULFATE HEPTAHYDRATE 40 MG/ML
2 INJECTION, SOLUTION INTRAVENOUS ONCE
Status: COMPLETED | OUTPATIENT
Start: 2019-11-10 | End: 2019-11-10

## 2019-11-10 RX ADMIN — ACETAMINOPHEN 650 MG: 325 TABLET, FILM COATED ORAL at 23:31

## 2019-11-10 RX ADMIN — RIVAROXABAN 20 MG: 20 TABLET, FILM COATED ORAL at 17:55

## 2019-11-10 RX ADMIN — LEVOTHYROXINE SODIUM 175 MCG: 125 TABLET ORAL at 05:23

## 2019-11-10 RX ADMIN — LIDOCAINE 1 PATCH: 50 PATCH TOPICAL at 15:14

## 2019-11-10 RX ADMIN — PREDNISONE 10 MG: 10 TABLET ORAL at 05:23

## 2019-11-10 RX ADMIN — METOPROLOL TARTRATE 25 MG: 25 TABLET, FILM COATED ORAL at 05:23

## 2019-11-10 RX ADMIN — METOPROLOL TARTRATE 25 MG: 25 TABLET, FILM COATED ORAL at 17:56

## 2019-11-10 RX ADMIN — BUDESONIDE AND FORMOTEROL FUMARATE DIHYDRATE 2 PUFF: 160; 4.5 AEROSOL RESPIRATORY (INHALATION) at 08:08

## 2019-11-10 RX ADMIN — GUAIFENESIN 600 MG: 600 TABLET, EXTENDED RELEASE ORAL at 05:23

## 2019-11-10 RX ADMIN — DIBASIC SODIUM PHOSPHATE, MONOBASIC POTASSIUM PHOSPHATE AND MONOBASIC SODIUM PHOSPHATE 1 TABLET: 852; 155; 130 TABLET ORAL at 17:55

## 2019-11-10 RX ADMIN — IPRATROPIUM BROMIDE AND ALBUTEROL SULFATE 3 ML: .5; 3 SOLUTION RESPIRATORY (INHALATION) at 19:13

## 2019-11-10 RX ADMIN — DIBASIC SODIUM PHOSPHATE, MONOBASIC POTASSIUM PHOSPHATE AND MONOBASIC SODIUM PHOSPHATE 1 TABLET: 852; 155; 130 TABLET ORAL at 05:23

## 2019-11-10 RX ADMIN — MAGNESIUM SULFATE IN WATER 2 G: 40 INJECTION, SOLUTION INTRAVENOUS at 08:02

## 2019-11-10 RX ADMIN — FUROSEMIDE 40 MG: 40 TABLET ORAL at 05:23

## 2019-11-10 RX ADMIN — BUDESONIDE AND FORMOTEROL FUMARATE DIHYDRATE 2 PUFF: 160; 4.5 AEROSOL RESPIRATORY (INHALATION) at 19:19

## 2019-11-10 RX ADMIN — POTASSIUM CHLORIDE 20 MEQ: 20 TABLET, EXTENDED RELEASE ORAL at 05:23

## 2019-11-10 RX ADMIN — GUAIFENESIN 600 MG: 600 TABLET, EXTENDED RELEASE ORAL at 17:55

## 2019-11-10 RX ADMIN — POTASSIUM CHLORIDE 40 MEQ: 1500 TABLET, EXTENDED RELEASE ORAL at 17:56

## 2019-11-10 RX ADMIN — IPRATROPIUM BROMIDE AND ALBUTEROL SULFATE 3 ML: .5; 3 SOLUTION RESPIRATORY (INHALATION) at 15:20

## 2019-11-10 RX ADMIN — NYSTATIN: 100000 POWDER TOPICAL at 17:56

## 2019-11-10 RX ADMIN — IPRATROPIUM BROMIDE AND ALBUTEROL SULFATE 3 ML: .5; 3 SOLUTION RESPIRATORY (INHALATION) at 11:22

## 2019-11-10 RX ADMIN — NYSTATIN: 100000 POWDER TOPICAL at 05:22

## 2019-11-10 ASSESSMENT — ENCOUNTER SYMPTOMS
SINUS PAIN: 0
SPUTUM PRODUCTION: 0
BLURRED VISION: 0
CHILLS: 0
SORE THROAT: 0
DIARRHEA: 0
STRIDOR: 0
DEPRESSION: 0
SHORTNESS OF BREATH: 1
WEAKNESS: 1
TINGLING: 0
CONSTIPATION: 0
HEMOPTYSIS: 0
WHEEZING: 0
HEADACHES: 0
BLOOD IN STOOL: 0
PHOTOPHOBIA: 0
SPEECH CHANGE: 0
NERVOUS/ANXIOUS: 0
HEARTBURN: 0
WEIGHT LOSS: 0
SENSORY CHANGE: 0
VOMITING: 0
MYALGIAS: 0
COUGH: 1
FEVER: 0
BRUISES/BLEEDS EASILY: 0
BACK PAIN: 0
POLYDIPSIA: 0
DIZZINESS: 0
ABDOMINAL PAIN: 0
DIAPHORESIS: 0
PALPITATIONS: 0
NECK PAIN: 0
NAUSEA: 0
FOCAL WEAKNESS: 0
FALLS: 0
DOUBLE VISION: 0

## 2019-11-10 NOTE — PROGRESS NOTES
Case discussed with hospitalist RN and Dr Martínez  Consult to follow  Right foot fractures traction to place post op shoe

## 2019-11-10 NOTE — PROGRESS NOTES
[facility] Medicine Daily Progress Note    Date of Service  11/10/2019    Chief Complaint  69 y.o. female COPD, hypothyroidism and morbid obesity admitted 10/28/2019 with respiratory arrest and cardiac arrest.    Hospital Course    69 y.o. female COPD, hypothyroidism and morbid obesity admitted 10/28/2019 with respiratory arrest and cardiac arrest.  Patient was in her usual state of health until 10/28 when her power went out and her oxygen compressor did not work.  She was seen at [facility] who sent her home with an oxygen tank however was found 4 hours later unconscious outside of her house by her neighbor.  The patient was taken to the ER where she was intubated after an ABG showed pH of 7.0 and elevated PCO2.  She subsequently suffered a PEA arrest after intubation.  Patient then transferred to [facility] however developed V. tach arrest and was shocked.  At [facility] she had a central line placed was started on amiodarone and Levophed.  A CTA of the chest was completed which was negative for pulmonary embolus however a small right-sided pneumo as well as an effusion and volume loss was noted.  A CT of the head was negative. She was extubated on 10/30.  She received high flow for a few days then weaned down to 7-10L, she was diuresed.  Pulm recommended nocturnal bipap however patient refused.  She developed atrial flutter.  She was started on xarelto and given amiodarone load.  Given her severe COPD as well as h/o hypthyroidism, did not think she was good long term candidate for amiodarone so was discontinued prior to final load.  She was found to have right foot hematoma.  Xray concerned for fracture healing vs possible osteomyelitis.  MRI obtained which was more consistent with subacute healing fractures.  Orthopedics consulted who placed patient in post-op shoe.          Interval Problem Update  - on 3L via NC  - MRI showed findings c/w subacute healing fracture and large hematoma, will consult ortho today  - no  complaints today  - not having as many BMs which patient happy about  - awaiting SNF placement    Consultants/Specialty  Pulm  Ortho    Code Status  FULL    Disposition  Likely SNF, pending acceptance    Review of Systems  Review of Systems   Constitutional: Positive for malaise/fatigue. Negative for chills and fever.   HENT: Negative for sore throat.    Respiratory: Positive for cough and shortness of breath.    Cardiovascular: Negative for chest pain and palpitations.   Gastrointestinal: Negative for abdominal pain, constipation, diarrhea, nausea and vomiting.   Genitourinary: Negative for dysuria.   Musculoskeletal: Negative for falls.        Foot pain (R> L)   Neurological: Positive for weakness. Negative for dizziness and headaches.        Physical Exam  Temp:  [36.4 °C (97.6 °F)-36.9 °C (98.5 °F)] 36.9 °C (98.4 °F)  Pulse:  [79-89] 82  Resp:  [16-20] 16  BP: ()/(55-79) 120/69  SpO2:  [86 %-92 %] 86 %    Physical Exam  Constitutional:       General: She is not in acute distress.     Appearance: She is obese. She is not toxic-appearing or diaphoretic.      Comments: Elderly morbidly obese female, chronically ill appearing   HENT:      Head: Normocephalic and atraumatic.      Nose: No congestion or rhinorrhea.      Mouth/Throat:      Mouth: Mucous membranes are moist.   Eyes:      General: No scleral icterus.     Conjunctiva/sclera: Conjunctivae normal.   Neck:      Musculoskeletal: Normal range of motion and neck supple.   Cardiovascular:      Rate and Rhythm: Normal rate and regular rhythm.      Pulses: Normal pulses.   Pulmonary:      Effort: Pulmonary effort is normal.      Breath sounds: Rales present. No wheezing.      Comments: On 3L via NC  Better air movement today, scattered rales  Abdominal:      General: Bowel sounds are normal. There is no distension.      Palpations: Abdomen is soft.      Tenderness: There is no tenderness.   Musculoskeletal:         General: No swelling.   Skin:     Findings:  Bruising present.      Comments: Bruising and hematoma on right foot   Neurological:      Mental Status: She is alert and oriented to person, place, and time. Mental status is at baseline.   Psychiatric:         Mood and Affect: Affect is not angry.         Judgment: Judgment is not impulsive or inappropriate.         Fluids    Intake/Output Summary (Last 24 hours) at 11/10/2019 1057  Last data filed at 11/10/2019 0500  Gross per 24 hour   Intake 240 ml   Output 1000 ml   Net -760 ml       Laboratory  Recent Labs     11/10/19  0245   WBC 7.1   RBC 4.56   HEMOGLOBIN 14.4   HEMATOCRIT 46.7   .4*   MCH 31.6   MCHC 30.8*   RDW 51.3*   PLATELETCT 191   MPV 9.8     Recent Labs     11/10/19  0245   SODIUM 146*   POTASSIUM 3.4*   CHLORIDE 102   CO2 37*   GLUCOSE 103*   BUN 28*   CREATININE 0.88   CALCIUM 10.1                   Imaging  MR-FOOT-WITH & W/O RIGHT   Final Result      1. A nonenhancing hyperintense T1, isointense T2 1.4 x 4.1 x 4.3 cm area deep to the subcutaneous tissue about the dorsum of the foot with overlying subcutaneous edema. This is nonspecific but could relate to hematoma. Superimposed infection cannot be    entirely excluded.      2. Mildly displaced fractures at the distal fourth and fifth metatarsals. Minimal marrow edema within no definite marrow replacement, suggesting subacute healing fractures. Osteomyelitis is considered less likely.      DX-FOOT-2- RIGHT   Final Result      Soft tissue swelling with subacute appearing fractures involving the necks of the fourth and fifth metatarsals with possible bone resorption which could be due to fractures or osteomyelitis.      DX-CHEST-PORTABLE (1 VIEW)   Final Result      1.  Progressive volume loss in the right lung, with progressive rightward mediastinal shift. Underlying pneumonia and small pleural effusion are possible in the proper clinical settings.   2.  Slight worsening of left basilar atelectasis versus consolidation. No significant left  effusion.      DX-CHEST-PORTABLE (1 VIEW)   Final Result      1.  Possible improved consolidation in the right lung versus related to differences in rotation.   2.  Improved interstitial opacity/edema.   3.  Left basilar atelectasis.      DX-CHEST-PORTABLE (1 VIEW)   Final Result      1.  Removal of endotracheal tube and enteric catheters      2.  No other change      DX-CHEST-PORTABLE (1 VIEW)   Final Result      Improved left upper lobe atelectasis and consolidation      Otherwise stable right worse than left consolidation and atelectasis with probable right pleural fluid      Bilateral acute rib fractures      EC-ECHOCARDIOGRAM COMPLETE W/O CONT   Final Result      DX-ABDOMEN FOR TUBE PLACEMENT   Final Result      Feeding tube in place as noted above.      DX-CHEST-PORTABLE (1 VIEW)   Final Result      Improving right upper lobe atelectasis. No significant change otherwise.      DX-ABDOMEN FOR TUBE PLACEMENT   Final Result      Nasogastric tube in place as noted above.      CT-CTA CHEST PULMONARY ARTERY W/ RECONS   Final Result   Addendum 1 of 1   Addendum dictated on 11/2/2019 by Dr. Peng as follows:   Addendum:   In reviewing the exam there is an incidentally noted inferomedial left    breast mass 19 x 23 mm. I discussed these findings seconds ago with the    intensivist currently covering the patient.      Final      DX-CHEST-PORTABLE (1 VIEW)   Final Result      Diffuse bilateral interstitial opacities, suggesting pulmonary edema. Additionally there are confluent and linear opacities throughout the right perihilar and upper lung field and in the left base, consistent with a combination of pneumonia and    atelectasis. All of this is considerably worse compared with the prior image.           Assessment/Plan  * Acute on chronic respiratory failure with hypoxia and hypercapnia (HCC)  Assessment & Plan  Due to significant hypercarbia, found to be hypoxic, acidemic, apparently without oxygen in  place  Subsequently intubated 10/28/2019, extubated 10/30  Continue respiratory protocol  Goal >86%  Wean oxygen as tolerated  Pulmonary toilet and continue bronchodilators  Lasix 40mg PO daily      Respiratory arrest (HCC)- (present on admission)  Assessment & Plan  Secondary to profound hypoxia, respiratory compromise  Negative for pulmonary embolism  Chronically oxygen dependent (on 3L at baseline)  Pulmonary critical care following    Atrial flutter (HCC)  Assessment & Plan  Intermittently in flutter, rate controlled, worry about long-term use with amiodarone given severe COPD and hypothyroidism,  DC amiodarone on 11/6 and can increase beta-blocker if needed  - Lopressor twice daily, anticoagulation with Xarelto    Ventricular tachycardia (HCC)- (present on admission)  Assessment & Plan  S/p amiodarone, dc'ed on 11/6  Continues on metoprolol 25mg BID  Echocardiogram noted with good EF      Traumatic hematoma of foot, right, initial encounter- (present on admission)  Assessment & Plan  Large hematoma noted on my first day with patient, patient unsure of what happened, no known trauma  - consulted wound who thought it looked more traumatic   - xray showed subacute fractures of toes and abnormalities that could be d/t fracture vs osteo,  - MRI 11/9: 1. A nonenhancing hyperintense T1, isointense T2 1.4 x 4.1 x 4.3 cm area deep to the subcutaneous tissue about the dorsum of the foot with overlying subcutaneous edema. This is nonspecific but could relate to hematoma. Superimposed infection cannot be entirely excluded. 2. Mildly displaced fractures at the distal fourth and fifth metatarsals. Minimal marrow edema within no definite marrow replacement, suggesting subacute healing fractures. Osteomyelitis is considered less likely.  - consulting ortho today for boot/shoe    Hypophosphatasia  Assessment & Plan  Improved  - 2-->2-->2.5  -K phos daily    Chronic obstructive pulmonary disease with acute exacerbation  (HCC)  Assessment & Plan  Ongoing respiratory treatment, inhaled steroids, rhonchi dilators, added Symbicort BID    Closed fracture of multiple ribs of both sides  Assessment & Plan  Acute and previously healed, pain control    Collapse of right lung  Assessment & Plan  Appears to be related to prior injury/trauma    Hypomagnesemia  Assessment & Plan  Replace and recheck labs    Encephalopathy- (present on admission)  Assessment & Plan  Resolved, no definitive anoxic brain injury  Follow clinically  Monitor      Thrombocytopenia (HCC)- (present on admission)  Assessment & Plan  Mild, cont to monitor    Morbid obesity (HCC)- (present on admission)  Assessment & Plan  Encourage weight loss when appropriate    Hypokalemia- (present on admission)  Assessment & Plan  3.4 today increased KCl for today    Urinary retention  Assessment & Plan  Resolved  Required I/O cath x 3, gonzalez placed but removed on 11/7, urinating without retention    Pulmonary hypertension (HCC)  Assessment & Plan  Likely from chronic pulmonary disease, diuresed appropriately    Left breast mass  Assessment & Plan  Incidental finding on CT  Discussed aspiration/biopsy         VTE prophylaxis: Xaralto

## 2019-11-10 NOTE — DISCHARGE PLANNING
Agency/Facility Name: [facility]  Spoke To: Nurs[facility]  Outcome: Admissions not available on weekends.    Agency/Facility Name: [facility]  Spoke To: Nurses [facility]  Outcome: Have to Monday for [facility]     Agency/Facility Name: [facility]  Spoke To: Nurses [facility]  Outcome: Admissions not available on weekends.

## 2019-11-10 NOTE — FLOWSHEET NOTE
Respiratory Therapy Update    Interdisciplinary Plan of Care-Goals (Indications)  Bronchodilator Indications: History / Diagnosis;Physical Exam / Hyperinflation / Wheezing (bronchospasm);Strong Subjective / Objective Improvement (11/10/19 1515)  Bronchopulmonary Hygiene Indications: Evidence of Retained Secretions (11/09/19 0628)  Interdisciplinary Plan of Care-Outcomes   Bronchodilator Outcome: Diminished Wheezing and Volume of Air Movement Increased;Patient at Stable Baseline (11/10/19 1515)  Bronchopulmonary Hygiene Outcome: Patient at Stable Baseline (11/09/19 0628)    #PEP/CPT (Manual) Initial: Initial (11/02/19 1125)     #SVN Performed: Yes (11/10/19 1515)    Cough: Moist;Non Productive (11/10/19 1515)  Sputum Amount: Unable to Evaluate (11/10/19 1515)  Sputum Color: Unable to Evaluate (11/10/19 1515)  Sputum Consistency: Unable to Evaluate (11/10/19 1515)    Heated Hi Flow Nasal Cannula (HHFNC): Yes (11/02/19 0709)  FiO2 (HHFNC): 60 (11/02/19 0709)  Flowrate (HHFNC): 40 (11/02/19 0709)    FiO2%: 33 % (11/08/19 1413)  O2 (LPM): 3 (11/10/19 1515)  O2 Daily Delivery Respiratory : Silicone Nasal Cannula (11/10/19 1515)    Breath Sounds  Pre/Post Intervention: Pre Intervention Assessment (11/10/19 1515)  RUL Breath Sounds: Diminished (11/10/19 1515)  RML Breath Sounds: Diminished (11/10/19 1515)  RLL Breath Sounds: Diminished (11/10/19 1515)  MARIO Breath Sounds: Diminished (11/10/19 1515)  LLL Breath Sounds: Diminished (11/10/19 1515)        Events/Summary/Plan: SVN done with flutter and IS in fowlers and a fair effort. (11/10/19 1515)

## 2019-11-10 NOTE — DISCHARGE PLANNING
Anticipated Discharge Disposition:   · SNF    Action:   · LSW notified by attending Dr. Hernandez that pt is medically clear. CCA to follow up with SNF on possible transfer, per follow up SNF unable to accept over the weekend. LSW notified provider via TT.     Barriers to Discharge:   · SNF acceptance    Plan:   · Care coordination will follow up with SNF on Monday 11/11/2019 and continue to assist with discharge plans/barriers as needed.
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
## 2019-11-11 LAB
ANION GAP SERPL CALC-SCNC: 7 MMOL/L (ref 0–11.9)
BUN SERPL-MCNC: 31 MG/DL (ref 8–22)
CALCIUM SERPL-MCNC: 10.2 MG/DL (ref 8.5–10.5)
CHLORIDE SERPL-SCNC: 101 MMOL/L (ref 96–112)
CO2 SERPL-SCNC: 36 MMOL/L (ref 20–33)
CREAT SERPL-MCNC: 0.87 MG/DL (ref 0.5–1.4)
GLUCOSE SERPL-MCNC: 116 MG/DL (ref 65–99)
MAGNESIUM SERPL-MCNC: 1.9 MG/DL (ref 1.5–2.5)
POTASSIUM SERPL-SCNC: 3.7 MMOL/L (ref 3.6–5.5)
SODIUM SERPL-SCNC: 144 MMOL/L (ref 135–145)

## 2019-11-11 PROCEDURE — 770006 HCHG ROOM/CARE - MED/SURG/GYN SEMI*

## 2019-11-11 PROCEDURE — 700102 HCHG RX REV CODE 250 W/ 637 OVERRIDE(OP): Performed by: HOSPITALIST

## 2019-11-11 PROCEDURE — 92526 ORAL FUNCTION THERAPY: CPT

## 2019-11-11 PROCEDURE — 700102 HCHG RX REV CODE 250 W/ 637 OVERRIDE(OP): Performed by: INTERNAL MEDICINE

## 2019-11-11 PROCEDURE — 94760 N-INVAS EAR/PLS OXIMETRY 1: CPT

## 2019-11-11 PROCEDURE — 700101 HCHG RX REV CODE 250: Performed by: INTERNAL MEDICINE

## 2019-11-11 PROCEDURE — 700101 HCHG RX REV CODE 250: Performed by: HOSPITALIST

## 2019-11-11 PROCEDURE — A9270 NON-COVERED ITEM OR SERVICE: HCPCS | Performed by: HOSPITALIST

## 2019-11-11 PROCEDURE — 94640 AIRWAY INHALATION TREATMENT: CPT

## 2019-11-11 PROCEDURE — 80048 BASIC METABOLIC PNL TOTAL CA: CPT

## 2019-11-11 PROCEDURE — 36415 COLL VENOUS BLD VENIPUNCTURE: CPT

## 2019-11-11 PROCEDURE — 94669 MECHANICAL CHEST WALL OSCILL: CPT

## 2019-11-11 PROCEDURE — 94668 MNPJ CHEST WALL SBSQ: CPT

## 2019-11-11 PROCEDURE — 700111 HCHG RX REV CODE 636 W/ 250 OVERRIDE (IP): Performed by: INTERNAL MEDICINE

## 2019-11-11 PROCEDURE — 83735 ASSAY OF MAGNESIUM: CPT

## 2019-11-11 PROCEDURE — A9270 NON-COVERED ITEM OR SERVICE: HCPCS | Performed by: INTERNAL MEDICINE

## 2019-11-11 PROCEDURE — 99232 SBSQ HOSP IP/OBS MODERATE 35: CPT | Performed by: INTERNAL MEDICINE

## 2019-11-11 RX ADMIN — BUDESONIDE AND FORMOTEROL FUMARATE DIHYDRATE 2 PUFF: 160; 4.5 AEROSOL RESPIRATORY (INHALATION) at 19:09

## 2019-11-11 RX ADMIN — IPRATROPIUM BROMIDE AND ALBUTEROL SULFATE 3 ML: .5; 3 SOLUTION RESPIRATORY (INHALATION) at 19:02

## 2019-11-11 RX ADMIN — DIBASIC SODIUM PHOSPHATE, MONOBASIC POTASSIUM PHOSPHATE AND MONOBASIC SODIUM PHOSPHATE 1 TABLET: 852; 155; 130 TABLET ORAL at 06:07

## 2019-11-11 RX ADMIN — METOPROLOL TARTRATE 25 MG: 25 TABLET, FILM COATED ORAL at 06:07

## 2019-11-11 RX ADMIN — IPRATROPIUM BROMIDE AND ALBUTEROL SULFATE 3 ML: .5; 3 SOLUTION RESPIRATORY (INHALATION) at 13:06

## 2019-11-11 RX ADMIN — RIVAROXABAN 20 MG: 20 TABLET, FILM COATED ORAL at 17:14

## 2019-11-11 RX ADMIN — GUAIFENESIN 600 MG: 600 TABLET, EXTENDED RELEASE ORAL at 17:14

## 2019-11-11 RX ADMIN — GUAIFENESIN 600 MG: 600 TABLET, EXTENDED RELEASE ORAL at 06:07

## 2019-11-11 RX ADMIN — ACETAMINOPHEN 325 MG: 325 TABLET, FILM COATED ORAL at 22:50

## 2019-11-11 RX ADMIN — NYSTATIN: 100000 POWDER TOPICAL at 17:14

## 2019-11-11 RX ADMIN — IPRATROPIUM BROMIDE AND ALBUTEROL SULFATE 3 ML: .5; 3 SOLUTION RESPIRATORY (INHALATION) at 06:50

## 2019-11-11 RX ADMIN — NYSTATIN: 100000 POWDER TOPICAL at 06:07

## 2019-11-11 RX ADMIN — METOPROLOL TARTRATE 25 MG: 25 TABLET, FILM COATED ORAL at 17:14

## 2019-11-11 RX ADMIN — LEVOTHYROXINE SODIUM 175 MCG: 125 TABLET ORAL at 06:07

## 2019-11-11 RX ADMIN — DIBASIC SODIUM PHOSPHATE, MONOBASIC POTASSIUM PHOSPHATE AND MONOBASIC SODIUM PHOSPHATE 1 TABLET: 852; 155; 130 TABLET ORAL at 17:14

## 2019-11-11 RX ADMIN — PREDNISONE 10 MG: 10 TABLET ORAL at 06:07

## 2019-11-11 RX ADMIN — BUDESONIDE AND FORMOTEROL FUMARATE DIHYDRATE 2 PUFF: 160; 4.5 AEROSOL RESPIRATORY (INHALATION) at 06:51

## 2019-11-11 RX ADMIN — LIDOCAINE 1 PATCH: 50 PATCH TOPICAL at 13:17

## 2019-11-11 RX ADMIN — FUROSEMIDE 40 MG: 40 TABLET ORAL at 06:07

## 2019-11-11 ASSESSMENT — ENCOUNTER SYMPTOMS
PALPITATIONS: 0
HEADACHES: 0
SHORTNESS OF BREATH: 0
NAUSEA: 0
VOMITING: 0
ABDOMINAL PAIN: 0
CONSTIPATION: 0
FEVER: 0
NECK PAIN: 1
COUGH: 0
WEAKNESS: 1
DIZZINESS: 0
DIARRHEA: 0
FALLS: 0
SORE THROAT: 0
CHILLS: 0

## 2019-11-11 ASSESSMENT — PAIN SCALES - WONG BAKER: WONGBAKER_NUMERICALRESPONSE: HURTS A WHOLE LOT

## 2019-11-11 NOTE — PROGRESS NOTES
Received pt report from day RN Cynthia.  Pt awake, alert, oriented X 4.  Pt resting in bed.  Bed in low locked position, call bell at the bedside, tray table & personal belongings within reach.  Non-skid footwear intact.  White board updated to reflect plan of care for current shift.

## 2019-11-11 NOTE — DISCHARGE PLANNING
Agency/Facility Name: [facility] ([facility])  Spoke To: Alicia  Outcome: Per Alicia, they are unable to accept patients until next week due to a Neuro Virus in the facility.     Agency/Facility Name: [facility]  Outcome: Left a message for an update on patient's referral .       Agency/Facility Name: [facility]  Outcome: Left a message for an update on patient's referral.

## 2019-11-11 NOTE — CONSULTS
2019    Reason for consultation: Right foot fractures    Consultation on Cheryl Bajwa at the request of Dr. Garrett for right foot fractures.  The patient is a 69 y.o. female who presents with right fractures due to unknown causes, presumably a fall by her history.  The patient notes she has had pain in that foot for some time but cannot remember for how long.  She does not recall a particular event that caused the injury.  She has pain with movement and attempts at weightbearing.  She does not have much pain at rest.  She localizes the pain to the dorsum of the foot primarily but also the lateral border.  They were admitted for the reasons mostly related to COPD however given her continued complaints of right foot pain her foot was evaluated and orthopedics was consulted. Patient denies numbness, paresthesias, loss of consciousness or other symptoms at this time.    Past Medical History:   Diagnosis Date   • Cataract    • Chronic obstructive pulmonary disease (HCC)    • Fall    • Hypothyroid    • Mitral valve disease    • PEA (Pulseless electrical activity) ([facility]) 10/28/2019   • Ventricular tachycardia ([facility]) 10/28/2019       Past Surgical History:   Procedure Laterality Date   • GYN SURGERY       x2       Medications  No current facility-administered medications on file prior to encounter.      Current Outpatient Medications on File Prior to Encounter   Medication Sig Dispense Refill   • ipratropium-albuterol (DUONEB) 0.5-2.5 (3) MG/3ML nebulizer solution 3 mL by Nebulization route every four hours as needed.  0   • levothyroxine (SYNTHROID) 137 MCG Tab Take 1 Tab by mouth Every morning on an empty stomach. 30 Tab    • nadolol (CORGARD) 40 MG Tab Take 40 mg by mouth every day.         Allergies  Doxycycline; Penicillins; and Sulfa drugs    ROS  Per HPI. All other systems were reviewed and found to be negative    No family history on file.    Social History     Socioeconomic History   • Marital status:  "Single     Spouse name: Not on file   • Number of children: Not on file   • Years of education: Not on file   • Highest education level: Not on file   Occupational History   • Not on file   Social Needs   • Financial resource strain: Not on file   • Food insecurity:     Worry: Not on file     Inability: Not on file   • Transportation needs:     Medical: Not on file     Non-medical: Not on file   Tobacco Use   • Smoking status: Former Smoker     Types: Cigarettes     Last attempt to quit: 2016     Years since quitting: 3.8   • Smokeless tobacco: Never Used   Substance and Sexual Activity   • Alcohol use: No   • Drug use: No   • Sexual activity: Not on file   Lifestyle   • Physical activity:     Days per week: Not on file     Minutes per session: Not on file   • Stress: Not on file   Relationships   • Social connections:     Talks on phone: Not on file     Gets together: Not on file     Attends [employer] service: Not on file     Active member of club or organization: Not on file     Attends meetings of clubs or organizations: Not on file     Relationship status: Not on file   • Intimate partner violence:     Fear of current or ex partner: Not on file     Emotionally abused: Not on file     Physically abused: Not on file     Forced sexual activity: Not on file   Other Topics Concern   • Not on file   Social History Narrative   • Not on file       Physical Exam  Vitals  /61   Pulse 86   Temp 36.6 °C (97.9 °F) (Temporal)   Resp 17   Ht 1.702 m (5' 7.01\")   Wt (!) 144.2 kg (317 lb 14.5 oz)   SpO2 89%   General: Well Developed, Well Nourished, no acute distress  Psychiatric: Alert and oriented x3, appropriate responses to questions, pleasant mood and affect.  HEENT: Normocephalic, atraumatic  Eyes: Anicteric, PERRL  Neck: Supple, nontender, no masses  Chest: Symmetric expansion of the chest wall, non-tender to palpation, no distress.  Heart: RRR, palpable peripheral pulses  Abdomen: Soft, NT, ND  Skin: Intact, " no open wounds  Extremities: Notable right foot dorsal hematoma without threatening of the overlying skin.  She is tender palpation in the area of the right foot.  No other deformities in her right foot.  She is tender palpation of the lateral forefoot.  Neuro: Intact light touch sensation in the feet.  Intact motor function tibialis anterior, gastrocsoleus, extremity EHL, peroneals.  Vascular: Palpable DP pulses bilaterally, Capillary refill <2 seconds    Radiographs:  MR-FOOT-WITH & W/O RIGHT   Final Result      1. A nonenhancing hyperintense T1, isointense T2 1.4 x 4.1 x 4.3 cm area deep to the subcutaneous tissue about the dorsum of the foot with overlying subcutaneous edema. This is nonspecific but could relate to hematoma. Superimposed infection cannot be    entirely excluded.      2. Mildly displaced fractures at the distal fourth and fifth metatarsals. Minimal marrow edema within no definite marrow replacement, suggesting subacute healing fractures. Osteomyelitis is considered less likely.      DX-FOOT-2- RIGHT   Final Result      Soft tissue swelling with subacute appearing fractures involving the necks of the fourth and fifth metatarsals with possible bone resorption which could be due to fractures or osteomyelitis.      DX-CHEST-PORTABLE (1 VIEW)   Final Result      1.  Progressive volume loss in the right lung, with progressive rightward mediastinal shift. Underlying pneumonia and small pleural effusion are possible in the proper clinical settings.   2.  Slight worsening of left basilar atelectasis versus consolidation. No significant left effusion.      DX-CHEST-PORTABLE (1 VIEW)   Final Result      1.  Possible improved consolidation in the right lung versus related to differences in rotation.   2.  Improved interstitial opacity/edema.   3.  Left basilar atelectasis.      DX-CHEST-PORTABLE (1 VIEW)   Final Result      1.  Removal of endotracheal tube and enteric catheters      2.  No other change       DX-CHEST-PORTABLE (1 VIEW)   Final Result      Improved left upper lobe atelectasis and consolidation      Otherwise stable right worse than left consolidation and atelectasis with probable right pleural fluid      Bilateral acute rib fractures      EC-ECHOCARDIOGRAM COMPLETE W/O CONT   Final Result      DX-ABDOMEN FOR TUBE PLACEMENT   Final Result      Feeding tube in place as noted above.      DX-CHEST-PORTABLE (1 VIEW)   Final Result      Improving right upper lobe atelectasis. No significant change otherwise.      DX-ABDOMEN FOR TUBE PLACEMENT   Final Result      Nasogastric tube in place as noted above.      CT-CTA CHEST PULMONARY ARTERY W/ RECONS   Final Result   Addendum 1 of 1   Addendum dictated on 11/2/2019 by Dr. Peng as follows:   Addendum:   In reviewing the exam there is an incidentally noted inferomedial left    breast mass 19 x 23 mm. I discussed these findings seconds ago with the    intensivist currently covering the patient.      Final      DX-CHEST-PORTABLE (1 VIEW)   Final Result      Diffuse bilateral interstitial opacities, suggesting pulmonary edema. Additionally there are confluent and linear opacities throughout the right perihilar and upper lung field and in the left base, consistent with a combination of pneumonia and    atelectasis. All of this is considerably worse compared with the prior image.          Laboratory Values  Recent Labs     11/10/19  0245   WBC 7.1   RBC 4.56   HEMOGLOBIN 14.4   HEMATOCRIT 46.7   .4*   MCH 31.6   MCHC 30.8*   RDW 51.3*   PLATELETCT 191   MPV 9.8     Recent Labs     11/10/19  0245   SODIUM 146*   POTASSIUM 3.4*   CHLORIDE 102   CO2 37*   GLUCOSE 103*   BUN 28*             Impression:    #1 right fourth and fifth metacarpal neck fractures    Plan:    Nonoperative management particular given her medical history.  She will be given a rigid postoperative shoe and can ambulate on her heel predominantly but also on the postoperative shoe as she  tolerates.  She should follow-up at the [place] [facility] in 2 to 3 weeks for repeat standing radiographs.

## 2019-11-11 NOTE — PROGRESS NOTES
Bedside report received. Patient A&O x4. 4L NC. SR on the monitor. POC discussed with patient. Patient verbalized understanding. Call light and belongings within reach. Bed locked and in lowest position, alarm and fall precautions in place.

## 2019-11-11 NOTE — CARE PLAN
Problem: Safety  Goal: Will remain free from injury  Outcome: PROGRESSING AS EXPECTED  Note:   Bed locked and in lowest position. Bed alarm on. Treaded socks provided. Call light and belongings within reach.  Patient educated to call for assistance. Pt verbalized understanding. Hourly rounding in place.        Problem: Urinary Elimination:  Goal: Ability to reestablish a normal urinary elimination pattern will improve  Outcome: PROGRESSING AS EXPECTED  Note:   Pt has urinary frequency.  Purewick in place.

## 2019-11-11 NOTE — DISCHARGE PLANNING
Agency/Facility Name: [facility]  Spoke To: Corry  Outcome: Patient declined as they do not offer OT at this time.

## 2019-11-11 NOTE — THERAPY
"Speech Language Therapy dysphagia treatment completed.     Functional Status:  Pt assessed over noon meal. Pt very resistant to HOB being raised d/t reported generalized pain, so head of bed raised very gradually/per pt tolerance. Pt with very hoarse quality of voice, vocal tremor upon sustained phonation, communicates at sentence level. Pt required hand over hand assistance to feed self d/t BUE tremor. Pt with very prolonged mastication of soft/chopped texture-puree textures better tolerated. Throat clear and wet voicing demonstrated in 3/5 trials nectar thickened liquid via straw. Single sips nectar thick via cup were tolerated without clinical s/o aspiration. Laryngeal elevation was complete to palpation. Pt consumed very little (~2 oz puree, 4 oz NTL) before declining further PO intake. Consider RD consult. Recommend pt continue Dysphagia 1 diet with nectar thickened liquids, no straws. SLP will continue to follow.     Recommendations: 1) Continue a Dysphagia 1 diet with nectar thickened liquids 2) No straws, small bites and sips, upright at 90 degrees or in chair if possible for all PO intake 3) Consider RD consult    Plan of Care: Will benefit from Speech Therapy 3 times per week    Post-Acute Therapy: Recommend inpatient transitional care services for continued speech therapy services.      See \"Rehab Therapy-Acute\" Patient Summary Report for complete documentation.     "

## 2019-11-11 NOTE — CARE PLAN
Problem: Bronchopulmonary Hygiene:  Goal: Increase mobilization of retained secretions  Outcome: PROGRESSING AS EXPECTED     Problem: Bronchoconstriction:  Goal: Improve in air movement and diminished wheezing  Outcome: PROGRESSING AS EXPECTED   Duoneb inline with flutter valve QID. Patient receiving 3LPM n/c. Treatment and O2 are patient's home regimen

## 2019-11-12 PROBLEM — S92.309A METATARSAL BONE FRACTURE: Status: ACTIVE | Noted: 2019-11-07

## 2019-11-12 LAB
ALBUMIN SERPL BCP-MCNC: 3.5 G/DL (ref 3.2–4.9)
ALBUMIN/GLOB SERPL: 1.4 G/DL
ALP SERPL-CCNC: 166 U/L (ref 30–99)
ALT SERPL-CCNC: 10 U/L (ref 2–50)
ANION GAP SERPL CALC-SCNC: 8 MMOL/L (ref 0–11.9)
AST SERPL-CCNC: 14 U/L (ref 12–45)
BASOPHILS # BLD AUTO: 0.3 % (ref 0–1.8)
BASOPHILS # BLD: 0.02 K/UL (ref 0–0.12)
BILIRUB SERPL-MCNC: 1.3 MG/DL (ref 0.1–1.5)
BUN SERPL-MCNC: 29 MG/DL (ref 8–22)
CALCIUM SERPL-MCNC: 10.1 MG/DL (ref 8.5–10.5)
CHLORIDE SERPL-SCNC: 98 MMOL/L (ref 96–112)
CO2 SERPL-SCNC: 36 MMOL/L (ref 20–33)
CREAT SERPL-MCNC: 0.84 MG/DL (ref 0.5–1.4)
EOSINOPHIL # BLD AUTO: 0.11 K/UL (ref 0–0.51)
EOSINOPHIL NFR BLD: 1.7 % (ref 0–6.9)
ERYTHROCYTE [DISTWIDTH] IN BLOOD BY AUTOMATED COUNT: 51.5 FL (ref 35.9–50)
GLOBULIN SER CALC-MCNC: 2.5 G/DL (ref 1.9–3.5)
GLUCOSE SERPL-MCNC: 106 MG/DL (ref 65–99)
HCT VFR BLD AUTO: 47.9 % (ref 37–47)
HGB BLD-MCNC: 14.5 G/DL (ref 12–16)
IMM GRANULOCYTES # BLD AUTO: 0.04 K/UL (ref 0–0.11)
IMM GRANULOCYTES NFR BLD AUTO: 0.6 % (ref 0–0.9)
LYMPHOCYTES # BLD AUTO: 0.65 K/UL (ref 1–4.8)
LYMPHOCYTES NFR BLD: 9.9 % (ref 22–41)
MAGNESIUM SERPL-MCNC: 1.8 MG/DL (ref 1.5–2.5)
MCH RBC QN AUTO: 31.9 PG (ref 27–33)
MCHC RBC AUTO-ENTMCNC: 30.3 G/DL (ref 33.6–35)
MCV RBC AUTO: 105.3 FL (ref 81.4–97.8)
MONOCYTES # BLD AUTO: 0.42 K/UL (ref 0–0.85)
MONOCYTES NFR BLD AUTO: 6.4 % (ref 0–13.4)
NEUTROPHILS # BLD AUTO: 5.34 K/UL (ref 2–7.15)
NEUTROPHILS NFR BLD: 81.1 % (ref 44–72)
NRBC # BLD AUTO: 0 K/UL
NRBC BLD-RTO: 0 /100 WBC
NT-PROBNP SERPL IA-MCNC: 262 PG/ML (ref 0–125)
PHOSPHATE SERPL-MCNC: 3.2 MG/DL (ref 2.5–4.5)
PLATELET # BLD AUTO: 155 K/UL (ref 164–446)
PMV BLD AUTO: 10.5 FL (ref 9–12.9)
POTASSIUM SERPL-SCNC: 3.3 MMOL/L (ref 3.6–5.5)
PROT SERPL-MCNC: 6 G/DL (ref 6–8.2)
RBC # BLD AUTO: 4.55 M/UL (ref 4.2–5.4)
SODIUM SERPL-SCNC: 142 MMOL/L (ref 135–145)
WBC # BLD AUTO: 6.6 K/UL (ref 4.8–10.8)

## 2019-11-12 PROCEDURE — A9270 NON-COVERED ITEM OR SERVICE: HCPCS | Performed by: INTERNAL MEDICINE

## 2019-11-12 PROCEDURE — 84100 ASSAY OF PHOSPHORUS: CPT

## 2019-11-12 PROCEDURE — 700102 HCHG RX REV CODE 250 W/ 637 OVERRIDE(OP): Performed by: INTERNAL MEDICINE

## 2019-11-12 PROCEDURE — 700102 HCHG RX REV CODE 250 W/ 637 OVERRIDE(OP)

## 2019-11-12 PROCEDURE — 94760 N-INVAS EAR/PLS OXIMETRY 1: CPT

## 2019-11-12 PROCEDURE — 700102 HCHG RX REV CODE 250 W/ 637 OVERRIDE(OP): Performed by: FAMILY MEDICINE

## 2019-11-12 PROCEDURE — A9270 NON-COVERED ITEM OR SERVICE: HCPCS | Performed by: HOSPITALIST

## 2019-11-12 PROCEDURE — 36415 COLL VENOUS BLD VENIPUNCTURE: CPT

## 2019-11-12 PROCEDURE — 83880 ASSAY OF NATRIURETIC PEPTIDE: CPT

## 2019-11-12 PROCEDURE — A9270 NON-COVERED ITEM OR SERVICE: HCPCS

## 2019-11-12 PROCEDURE — 99233 SBSQ HOSP IP/OBS HIGH 50: CPT | Performed by: FAMILY MEDICINE

## 2019-11-12 PROCEDURE — 700102 HCHG RX REV CODE 250 W/ 637 OVERRIDE(OP): Performed by: HOSPITALIST

## 2019-11-12 PROCEDURE — 700101 HCHG RX REV CODE 250: Performed by: HOSPITALIST

## 2019-11-12 PROCEDURE — 92526 ORAL FUNCTION THERAPY: CPT

## 2019-11-12 PROCEDURE — A9270 NON-COVERED ITEM OR SERVICE: HCPCS | Performed by: FAMILY MEDICINE

## 2019-11-12 PROCEDURE — 94640 AIRWAY INHALATION TREATMENT: CPT

## 2019-11-12 PROCEDURE — 83735 ASSAY OF MAGNESIUM: CPT

## 2019-11-12 PROCEDURE — 700101 HCHG RX REV CODE 250: Performed by: INTERNAL MEDICINE

## 2019-11-12 PROCEDURE — 770006 HCHG ROOM/CARE - MED/SURG/GYN SEMI*

## 2019-11-12 PROCEDURE — 85025 COMPLETE CBC W/AUTO DIFF WBC: CPT

## 2019-11-12 PROCEDURE — 700111 HCHG RX REV CODE 636 W/ 250 OVERRIDE (IP): Performed by: HOSPITALIST

## 2019-11-12 PROCEDURE — 80053 COMPREHEN METABOLIC PANEL: CPT

## 2019-11-12 PROCEDURE — 94669 MECHANICAL CHEST WALL OSCILL: CPT

## 2019-11-12 PROCEDURE — 700111 HCHG RX REV CODE 636 W/ 250 OVERRIDE (IP): Performed by: FAMILY MEDICINE

## 2019-11-12 PROCEDURE — 94668 MNPJ CHEST WALL SBSQ: CPT

## 2019-11-12 RX ORDER — POTASSIUM CHLORIDE 20 MEQ/1
20 TABLET, EXTENDED RELEASE ORAL 2 TIMES DAILY
Status: DISCONTINUED | OUTPATIENT
Start: 2019-11-12 | End: 2019-11-16

## 2019-11-12 RX ORDER — MAGNESIUM SULFATE HEPTAHYDRATE 40 MG/ML
2 INJECTION, SOLUTION INTRAVENOUS ONCE
Status: COMPLETED | OUTPATIENT
Start: 2019-11-12 | End: 2019-11-12

## 2019-11-12 RX ADMIN — IPRATROPIUM BROMIDE AND ALBUTEROL SULFATE 3 ML: .5; 3 SOLUTION RESPIRATORY (INHALATION) at 12:20

## 2019-11-12 RX ADMIN — LIDOCAINE 1 PATCH: 50 PATCH TOPICAL at 15:15

## 2019-11-12 RX ADMIN — ACETAMINOPHEN 325 MG: 325 TABLET, FILM COATED ORAL at 10:10

## 2019-11-12 RX ADMIN — NYSTATIN: 100000 POWDER TOPICAL at 18:15

## 2019-11-12 RX ADMIN — BUDESONIDE AND FORMOTEROL FUMARATE DIHYDRATE 2 PUFF: 160; 4.5 AEROSOL RESPIRATORY (INHALATION) at 19:37

## 2019-11-12 RX ADMIN — GUAIFENESIN 600 MG: 600 TABLET, EXTENDED RELEASE ORAL at 18:15

## 2019-11-12 RX ADMIN — DIBASIC SODIUM PHOSPHATE, MONOBASIC POTASSIUM PHOSPHATE AND MONOBASIC SODIUM PHOSPHATE 1 TABLET: 852; 155; 130 TABLET ORAL at 05:39

## 2019-11-12 RX ADMIN — POTASSIUM CHLORIDE 20 MEQ: 20 TABLET, EXTENDED RELEASE ORAL at 18:15

## 2019-11-12 RX ADMIN — GUAIFENESIN 600 MG: 600 TABLET, EXTENDED RELEASE ORAL at 05:40

## 2019-11-12 RX ADMIN — ONDANSETRON 4 MG: 2 INJECTION INTRAMUSCULAR; INTRAVENOUS at 20:23

## 2019-11-12 RX ADMIN — METOPROLOL TARTRATE 25 MG: 25 TABLET, FILM COATED ORAL at 05:39

## 2019-11-12 RX ADMIN — ACETAMINOPHEN 325 MG: 325 TABLET, FILM COATED ORAL at 05:59

## 2019-11-12 RX ADMIN — NYSTATIN: 100000 POWDER TOPICAL at 05:40

## 2019-11-12 RX ADMIN — IPRATROPIUM BROMIDE AND ALBUTEROL SULFATE 3 ML: .5; 3 SOLUTION RESPIRATORY (INHALATION) at 08:47

## 2019-11-12 RX ADMIN — IPRATROPIUM BROMIDE AND ALBUTEROL SULFATE 3 ML: .5; 3 SOLUTION RESPIRATORY (INHALATION) at 15:35

## 2019-11-12 RX ADMIN — ACETAMINOPHEN 650 MG: 325 TABLET, FILM COATED ORAL at 20:23

## 2019-11-12 RX ADMIN — FUROSEMIDE 40 MG: 40 TABLET ORAL at 05:39

## 2019-11-12 RX ADMIN — RIVAROXABAN 20 MG: 20 TABLET, FILM COATED ORAL at 18:15

## 2019-11-12 RX ADMIN — IPRATROPIUM BROMIDE AND ALBUTEROL SULFATE 3 ML: .5; 3 SOLUTION RESPIRATORY (INHALATION) at 19:38

## 2019-11-12 RX ADMIN — MAGNESIUM SULFATE 2 G: 2 INJECTION INTRAVENOUS at 15:15

## 2019-11-12 RX ADMIN — LEVOTHYROXINE SODIUM 175 MCG: 125 TABLET ORAL at 05:39

## 2019-11-12 ASSESSMENT — ENCOUNTER SYMPTOMS
COUGH: 0
NECK PAIN: 0
DIZZINESS: 0
DIAPHORESIS: 0
NAUSEA: 0
CHILLS: 0
VOMITING: 0
NERVOUS/ANXIOUS: 0
HEARTBURN: 0
BLURRED VISION: 0
SORE THROAT: 0
WHEEZING: 0
DIARRHEA: 0
SHORTNESS OF BREATH: 0
FLANK PAIN: 0
WEAKNESS: 1
PALPITATIONS: 0
FOCAL WEAKNESS: 0
ABDOMINAL PAIN: 0
FEVER: 0
BACK PAIN: 0
HEADACHES: 0
SPEECH CHANGE: 0
SENSORY CHANGE: 0

## 2019-11-12 NOTE — ASSESSMENT & PLAN NOTE
Decreased metoprolol to 12.5 mg twice daily with parameters placed  Controlled continue monitoring

## 2019-11-12 NOTE — PROGRESS NOTES
Full SBAR report called to Cynthia ESPINO taking over care of patient on transfer to room S530-2. Pt notified and aware of transfer, all belongings present with patient. Transport scheduled to come transfer patient.    2225- Pt taken to room S530-2 in stable condition via bed by transport team. All belongings and paper chart sent with patient.

## 2019-11-12 NOTE — PROGRESS NOTES
Pt transferred from [place] [id number] in S530-2 via bed. Assessment completed. VSS, pt is A/Ox4.  The patient complained of pain 3/10, medicated per the MAR. Resumed her 4L oxygen via nasal cannula. Bed in the lowest locked position, call light in reach.

## 2019-11-12 NOTE — PROGRESS NOTES
Assumed patient care. Report received from ronald Ruth shift RN. Patient irritable regarding turns, reeducated. Patient allowed RN and CNA to perform full linen change and turn. Hot packs given to help alleviate bilateral ribcage pain.

## 2019-11-12 NOTE — CARE PLAN
Problem: Safety  Goal: Will remain free from falls  Outcome: PROGRESSING AS EXPECTED  Intervention: Assess risk factors for falls  Flowsheets  Taken 11/11/2019 2230  Pt Calls for Assistance: Yes  Taken 11/12/2019 0355  History of fall: 0  Intervention: Implement fall precautions  Flowsheets (Taken 11/11/2019 2230)  Environmental Precautions: Treaded Slipper Socks on Patient;Personal Belongings, Wastebasket, Call Bell etc. in Easy Reach;Bed in Low Position     Problem: Mobility  Goal: Risk for activity intolerance will decrease  Outcome: NOT MET  Note:   Pt refusing to ambulate or to be turned,

## 2019-11-12 NOTE — CARE PLAN
Problem: Nutritional:  Goal: Achieve adequate nutritional intake  Description  Patient will consume 50% of meals and supplements   Outcome: NOT MET    Boost plus supplements added TID with meals

## 2019-11-12 NOTE — THERAPY
"Speech Language Therapy dysphagia treatment completed.   Functional Status:  Patient declined to be repositioned and to sit upright. She agreed to 50 degrees HOB. SpO2 at 86% after repositioning with very slow increase to 89% prior to PO intake. She did not feel out of breath. D1, NTL meal tray given with 1:1 feeding due to severe upper extremity tremor. Thin water was noted at bedside and provided. Unfortunately, she had throat clearing 50% of the time. Education provided regarding swallow function and importance of sitting upright. She verbalized understanding but declined repositioning. NTL by cup provided without observed throat clearing or coughing. Pureed peaches and cream of wheat consumed without s/s of aspiration. She refused entrees. SpO2 monitored throughout meal. She dropped back down to 86%. Education on COPD and effects on the swallow function provided. She verbalized understanding and agreed to multiple rest breaks during intake. She agreed to swallow precautions of single sips/small sips and no straws. OMEX attempted but she declined. Support and encouragement provided as well as education regarding the need for exercises of all disciplines if she wishes to advance past pureed foods. She verbalized understanding but said \"purees is fine.\"    Recommendations: Dysphagia 1, Nectar thick liquid. No straws. 1:1 feeding. Encourage HOB to 90 degrees   Plan of Care: POC for SLP for 1x/wk for swallow reassessments. Frequency of sessions to increase once patient is ready for therapeutic exercises.  Post-Acute Therapy: Recommend inpatient transitional care services for continued speech therapy services.        See \"Rehab Therapy-Acute\" Patient Summary Report for complete documentation.     "

## 2019-11-12 NOTE — PROGRESS NOTES
[facility] Medicine Daily Progress Note    Date of Service  11/12/2019    Chief Complaint  69 y.o. female admitted 10/28/2019 with cardiorespiratory arrest.    Hospital Course  Admitted with acute on chronic respiratory failure requiring intubation. PEA arrest after intubation. Transferred to [facility] however developed V. tach arrest and was shocked.  Eventually extubated.  Developed paroxysmal atrial flutter, initially placed on amiodarone.  Noted to have bilateral acute rib fractures, and metatarsal fractures on the right foot.    Interval Problem Update  Respiratory failure - O2 4 lpm NC  A flutter - currently sinus  HTN - sbp 90  Low potassium and magnesium    Consultants/Specialty  Critical care   Cardiology  Orthopedic surgery    Code Status  Full    Disposition  SNF    Review of Systems  Review of Systems   Constitutional: Positive for malaise/fatigue. Negative for chills, diaphoresis and fever.   HENT: Negative for hearing loss and sore throat.    Eyes: Negative for blurred vision.   Respiratory: Negative for cough, shortness of breath and wheezing.    Cardiovascular: Positive for leg swelling. Negative for chest pain and palpitations.   Gastrointestinal: Negative for abdominal pain, diarrhea, heartburn, nausea and vomiting.   Genitourinary: Negative for dysuria, flank pain and hematuria.   Musculoskeletal: Positive for joint pain. Negative for back pain and neck pain.   Skin: Negative for rash.   Neurological: Positive for weakness. Negative for dizziness, sensory change, speech change, focal weakness and headaches.   Psychiatric/Behavioral: The patient is not nervous/anxious.         Physical Exam  Temp:  [36.6 °C (97.9 °F)-37.4 °C (99.3 °F)] 37 °C (98.6 °F)  Pulse:  [73-98] 78  Resp:  [16-24] 18  BP: ()/(55-69) 94/55  SpO2:  [90 %-92 %] 91 %    Physical Exam  Constitutional:       Appearance: She is obese.   HENT:      Head: Normocephalic and atraumatic.      Nose: No congestion.      Mouth/Throat:       Mouth: Mucous membranes are moist.   Eyes:      Conjunctiva/sclera: Conjunctivae normal.      Pupils: Pupils are equal, round, and reactive to light.   Neck:      Musculoskeletal: No muscular tenderness.   Cardiovascular:      Rate and Rhythm: Normal rate and regular rhythm.   Pulmonary:      Effort: Accessory muscle usage present.      Breath sounds: Normal breath sounds.   Abdominal:      General: Bowel sounds are normal. There is no distension.      Palpations: Abdomen is soft.      Tenderness: There is no tenderness. There is no guarding or rebound.   Musculoskeletal:      Right lower leg: Edema present.      Left lower leg: Edema present.   Lymphadenopathy:      Cervical: No cervical adenopathy.   Skin:     Findings: Bruising (Right foot) present.   Neurological:      General: No focal deficit present.      Mental Status: She is alert and oriented to person, place, and time.      Cranial Nerves: No cranial nerve deficit.         Fluids    Intake/Output Summary (Last 24 hours) at 11/12/2019 1414  Last data filed at 11/12/2019 1012  Gross per 24 hour   Intake 370 ml   Output --   Net 370 ml       Laboratory  Recent Labs     11/10/19  0245 11/12/19  1201   WBC 7.1 6.6   RBC 4.56 4.55   HEMOGLOBIN 14.4 14.5   HEMATOCRIT 46.7 47.9*   .4* 105.3*   MCH 31.6 31.9   MCHC 30.8* 30.3*   RDW 51.3* 51.5*   PLATELETCT 191 155*   MPV 9.8 10.5     Recent Labs     11/10/19  0245 11/11/19  0157 11/12/19  1201   SODIUM 146* 144 142   POTASSIUM 3.4* 3.7 3.3*   CHLORIDE 102 101 98   CO2 37* 36* 36*   GLUCOSE 103* 116* 106*   BUN 28* 31* 29*   CREATININE 0.88 0.87 0.84   CALCIUM 10.1 10.2 10.1                   Imaging  MR-FOOT-WITH & W/O RIGHT   Final Result      1. A nonenhancing hyperintense T1, isointense T2 1.4 x 4.1 x 4.3 cm area deep to the subcutaneous tissue about the dorsum of the foot with overlying subcutaneous edema. This is nonspecific but could relate to hematoma. Superimposed infection cannot be    entirely  excluded.      2. Mildly displaced fractures at the distal fourth and fifth metatarsals. Minimal marrow edema within no definite marrow replacement, suggesting subacute healing fractures. Osteomyelitis is considered less likely.      DX-FOOT-2- RIGHT   Final Result      Soft tissue swelling with subacute appearing fractures involving the necks of the fourth and fifth metatarsals with possible bone resorption which could be due to fractures or osteomyelitis.      DX-CHEST-PORTABLE (1 VIEW)   Final Result      1.  Progressive volume loss in the right lung, with progressive rightward mediastinal shift. Underlying pneumonia and small pleural effusion are possible in the proper clinical settings.   2.  Slight worsening of left basilar atelectasis versus consolidation. No significant left effusion.      DX-CHEST-PORTABLE (1 VIEW)   Final Result      1.  Possible improved consolidation in the right lung versus related to differences in rotation.   2.  Improved interstitial opacity/edema.   3.  Left basilar atelectasis.      DX-CHEST-PORTABLE (1 VIEW)   Final Result      1.  Removal of endotracheal tube and enteric catheters      2.  No other change      DX-CHEST-PORTABLE (1 VIEW)   Final Result      Improved left upper lobe atelectasis and consolidation      Otherwise stable right worse than left consolidation and atelectasis with probable right pleural fluid      Bilateral acute rib fractures      EC-ECHOCARDIOGRAM COMPLETE W/O CONT   Final Result      DX-ABDOMEN FOR TUBE PLACEMENT   Final Result      Feeding tube in place as noted above.      DX-CHEST-PORTABLE (1 VIEW)   Final Result      Improving right upper lobe atelectasis. No significant change otherwise.      DX-ABDOMEN FOR TUBE PLACEMENT   Final Result      Nasogastric tube in place as noted above.      CT-CTA CHEST PULMONARY ARTERY W/ RECONS   Final Result   Addendum 1 of 1   Addendum dictated on 11/2/2019 by Dr. Peng as follows:   Addendum:   In reviewing the  exam there is an incidentally noted inferomedial left    breast mass 19 x 23 mm. I discussed these findings seconds ago with the    intensivist currently covering the patient.      Final      DX-CHEST-PORTABLE (1 VIEW)   Final Result      Diffuse bilateral interstitial opacities, suggesting pulmonary edema. Additionally there are confluent and linear opacities throughout the right perihilar and upper lung field and in the left base, consistent with a combination of pneumonia and    atelectasis. All of this is considerably worse compared with the prior image.           Assessment/Plan  * Acute on chronic respiratory failure with hypoxia and hypercapnia (HCC)- (present on admission)  Assessment & Plan  Intubated 10/28/2019, extubated 10/30  Refused nocturnal BiPAP  RT protocol  PT and OT    Metatarsal bone fracture- (present on admission)  Assessment & Plan  postoperative shoe  Pain control    Chronic obstructive pulmonary disease with acute exacerbation (HCC)- (present on admission)  Assessment & Plan  Symbicort, RT protocol    Encephalopathy- (present on admission)  Assessment & Plan  Avoid benzodiazepines and anticholinergics  Frequent orientation  Avoid early morning labs  Avoid vital signs during sleep  Ambulate if possible      Hypophosphatemia- (present on admission)  Assessment & Plan  Stop Kphos    Pulmonary hypertension (HCC)- (present on admission)  Assessment & Plan  Lasix    Left breast mass- (present on admission)  Assessment & Plan  Will need biopsy      Closed fracture of multiple ribs of both sides- (present on admission)  Assessment & Plan  Acute and previously healed, pain control    Collapse of right lung- (present on admission)  Assessment & Plan  resolved    Paroxysmal atrial flutter (HCC)  Assessment & Plan  Metoprolol, Xarelto    Hypomagnesemia- (present on admission)  Assessment & Plan  IV Mg today    Ventricular tachycardia (HCC)- (present on admission)  Assessment & Plan  Amiodarone stopped  on 11/6  Metoprolol      Hypokalemia- (present on admission)  Assessment & Plan  Start K-Dur 20 meqs twice daily  Follow BMP    Hypertension- (present on admission)  Assessment & Plan  Decrease metoprolol to 12.5 mg twice daily with parameters placed    Other specified hypothyroidism- (present on admission)  Assessment & Plan  Synthroid increased to 175 mcg on 11/1/2019  Needs repeat TSH on 12/1/2019    Urinary retention  Assessment & Plan  Resolved    Thrombocytopenia (HCC)- (present on admission)  Assessment & Plan  Follow cbc    Morbid obesity (HCC)- (present on admission)  Assessment & Plan  Body mass index is 49.78 kg/m².       VTE prophylaxis: Xarelto

## 2019-11-12 NOTE — PROGRESS NOTES
[facility] Medicine Daily Progress Note    Date of Service  11/11/2019    Chief Complaint  69 y.o. female COPD, hypothyroidism and morbid obesity admitted 10/28/2019 with respiratory arrest and cardiac arrest.    Hospital Course    69 y.o. female COPD, hypothyroidism and morbid obesity admitted 10/28/2019 with respiratory arrest and cardiac arrest.  Patient was in her usual state of health until 10/28 when her power went out and her oxygen compressor did not work.  She was seen at [facility] who sent her home with an oxygen tank however was found 4 hours later unconscious outside of her house by her neighbor.  The patient was taken to the ER where she was intubated after an ABG showed pH of 7.0 and elevated PCO2.  She subsequently suffered a PEA arrest after intubation.  Patient then transferred to [facility] however developed V. tach arrest and was shocked.  At [facility] she had a central line placed was started on amiodarone and Levophed.  A CTA of the chest was completed which was negative for pulmonary embolus however a small right-sided pneumo as well as an effusion and volume loss was noted.  A CT of the head was negative. She was extubated on 10/30.  She received high flow for a few days then weaned down to 7-10L, she was diuresed.  Pulm recommended nocturnal bipap however patient refused.  She developed atrial flutter.  She was started on xarelto and given amiodarone load.  Given her severe COPD as well as h/o hypthyroidism, did not think she was good long term candidate for amiodarone so was discontinued prior to final load.  She's now on her baseline oxygen (3L) and has intermittent aflutter but rate controlled on BB.  She was found to have right foot hematoma.  Xray concerned for fracture healing vs possible osteomyelitis.  MRI obtained which was more consistent with subacute healing fractures.  Orthopedics consulted who placed patient in post-op shoe.          Interval Problem Update  - on 3L via NC  - ortho  saw patient yesterday, did not recommend surgery, gave post-op shoe  - no issues or concerns today  - afebrile  - having BMs (without bowel regimen scheduled)  - awaiting SNF placement (Out of medicare SNF days and otherwise has [facility])    Consultants/Specialty  Pulm  Ortho    Code Status  FULL    Disposition  Likely SNF, pending acceptance    Review of Systems  Review of Systems   Constitutional: Positive for malaise/fatigue. Negative for chills and fever.   HENT: Negative for sore throat.    Respiratory: Negative for cough and shortness of breath.    Cardiovascular: Negative for chest pain and palpitations.   Gastrointestinal: Negative for abdominal pain, constipation, diarrhea, nausea and vomiting.   Genitourinary: Negative for dysuria.   Musculoskeletal: Positive for neck pain. Negative for falls.        Foot pain (R> L)   Neurological: Positive for weakness. Negative for dizziness and headaches.        Physical Exam  Temp:  [36.6 °C (97.9 °F)-36.8 °C (98.2 °F)] 36.8 °C (98.2 °F)  Pulse:  [76-99] 85  Resp:  [16-24] 24  BP: (101-121)/(61-69) 107/69  SpO2:  [89 %-91 %] 90 %    Physical Exam  Constitutional:       General: She is not in acute distress.     Appearance: She is obese. She is not toxic-appearing or diaphoretic.      Comments: Elderly morbidly obese female, chronically ill appearing   HENT:      Head: Normocephalic and atraumatic.      Nose: No congestion or rhinorrhea.      Mouth/Throat:      Mouth: Mucous membranes are moist.   Eyes:      General: No scleral icterus.     Conjunctiva/sclera: Conjunctivae normal.   Neck:      Musculoskeletal: Normal range of motion and neck supple.   Cardiovascular:      Rate and Rhythm: Normal rate and regular rhythm.      Pulses: Normal pulses.   Pulmonary:      Effort: Pulmonary effort is normal.      Breath sounds: Rales present. No wheezing.      Comments: On 3L via NC  Better air movement today, scattered rales  Abdominal:      General: Bowel sounds are normal.  There is no distension.      Palpations: Abdomen is soft.      Tenderness: There is no tenderness.   Musculoskeletal:         General: No swelling.   Skin:     Findings: Bruising present.      Comments: Bruising and hematoma on right foot   Neurological:      Mental Status: She is alert and oriented to person, place, and time. Mental status is at baseline.   Psychiatric:         Mood and Affect: Affect is not angry.         Judgment: Judgment is not impulsive or inappropriate.         Fluids    Intake/Output Summary (Last 24 hours) at 11/11/2019 1935  Last data filed at 11/11/2019 1308  Gross per 24 hour   Intake 360 ml   Output 2750 ml   Net -2390 ml       Laboratory  Recent Labs     11/10/19  0245   WBC 7.1   RBC 4.56   HEMOGLOBIN 14.4   HEMATOCRIT 46.7   .4*   MCH 31.6   MCHC 30.8*   RDW 51.3*   PLATELETCT 191   MPV 9.8     Recent Labs     11/10/19  0245 11/11/19  0157   SODIUM 146* 144   POTASSIUM 3.4* 3.7   CHLORIDE 102 101   CO2 37* 36*   GLUCOSE 103* 116*   BUN 28* 31*   CREATININE 0.88 0.87   CALCIUM 10.1 10.2                   Imaging  MR-FOOT-WITH & W/O RIGHT   Final Result      1. A nonenhancing hyperintense T1, isointense T2 1.4 x 4.1 x 4.3 cm area deep to the subcutaneous tissue about the dorsum of the foot with overlying subcutaneous edema. This is nonspecific but could relate to hematoma. Superimposed infection cannot be    entirely excluded.      2. Mildly displaced fractures at the distal fourth and fifth metatarsals. Minimal marrow edema within no definite marrow replacement, suggesting subacute healing fractures. Osteomyelitis is considered less likely.      DX-FOOT-2- RIGHT   Final Result      Soft tissue swelling with subacute appearing fractures involving the necks of the fourth and fifth metatarsals with possible bone resorption which could be due to fractures or osteomyelitis.      DX-CHEST-PORTABLE (1 VIEW)   Final Result      1.  Progressive volume loss in the right lung, with  progressive rightward mediastinal shift. Underlying pneumonia and small pleural effusion are possible in the proper clinical settings.   2.  Slight worsening of left basilar atelectasis versus consolidation. No significant left effusion.      DX-CHEST-PORTABLE (1 VIEW)   Final Result      1.  Possible improved consolidation in the right lung versus related to differences in rotation.   2.  Improved interstitial opacity/edema.   3.  Left basilar atelectasis.      DX-CHEST-PORTABLE (1 VIEW)   Final Result      1.  Removal of endotracheal tube and enteric catheters      2.  No other change      DX-CHEST-PORTABLE (1 VIEW)   Final Result      Improved left upper lobe atelectasis and consolidation      Otherwise stable right worse than left consolidation and atelectasis with probable right pleural fluid      Bilateral acute rib fractures      EC-ECHOCARDIOGRAM COMPLETE W/O CONT   Final Result      DX-ABDOMEN FOR TUBE PLACEMENT   Final Result      Feeding tube in place as noted above.      DX-CHEST-PORTABLE (1 VIEW)   Final Result      Improving right upper lobe atelectasis. No significant change otherwise.      DX-ABDOMEN FOR TUBE PLACEMENT   Final Result      Nasogastric tube in place as noted above.      CT-CTA CHEST PULMONARY ARTERY W/ RECONS   Final Result   Addendum 1 of 1   Addendum dictated on 11/2/2019 by Dr. Peng as follows:   Addendum:   In reviewing the exam there is an incidentally noted inferomedial left    breast mass 19 x 23 mm. I discussed these findings seconds ago with the    intensivist currently covering the patient.      Final      DX-CHEST-PORTABLE (1 VIEW)   Final Result      Diffuse bilateral interstitial opacities, suggesting pulmonary edema. Additionally there are confluent and linear opacities throughout the right perihilar and upper lung field and in the left base, consistent with a combination of pneumonia and    atelectasis. All of this is considerably worse compared with the prior image.            Assessment/Plan  * Acute on chronic respiratory failure with hypoxia and hypercapnia (HCC)  Assessment & Plan  Due to significant hypercarbia, found to be hypoxic, acidemic, apparently without oxygen in place  Subsequently intubated 10/28/2019, extubated 10/30  Continue respiratory protocol  Goal >86%  Wean oxygen as tolerated  Pulmonary toilet and continue bronchodilators  Lasix 40mg PO daily      Atrial flutter (HCC)  Assessment & Plan  Intermittently in flutter, rate controlled, worry about long-term use with amiodarone given severe COPD and hypothyroidism,  DC amiodarone on 11/6 and can increase beta-blocker if needed  - Lopressor twice daily, anticoagulation with Xarelto    Ventricular tachycardia (HCC)- (present on admission)  Assessment & Plan  S/p amiodarone, dc'ed on 11/6  Continues on metoprolol 25mg BID  Echocardiogram noted with good EF      Respiratory arrest (HCC)- (present on admission)  Assessment & Plan  Resolved  Secondary to profound hypoxia, respiratory compromise  Negative for pulmonary embolism  Chronically oxygen dependent (on 3L at baseline)  Pulmonary critical care signed off    Traumatic hematoma of foot, right, initial encounter- (present on admission)  Assessment & Plan  Large hematoma noted on my first day with patient, patient unsure of what happened, no known trauma  - consulted wound who thought it looked more traumatic   - xray showed subacute fractures of toes and abnormalities that could be d/t fracture vs osteo,  - MRI 11/9: 1. A nonenhancing hyperintense T1, isointense T2 1.4 x 4.1 x 4.3 cm area deep to the subcutaneous tissue about the dorsum of the foot with overlying subcutaneous edema. This is nonspecific but could relate to hematoma. Superimposed infection cannot be entirely excluded. 2. Mildly displaced fractures at the distal fourth and fifth metatarsals. Minimal marrow edema within no definite marrow replacement, suggesting subacute healing fractures.  Osteomyelitis is considered less likely.  - ortho consulted who did not think needed surgery, gave post-op shoe, recommended ambulating on heel of that foot    Hypophosphatasia  Assessment & Plan  Improved  - 2-->2-->2.5  -K phos daily    Chronic obstructive pulmonary disease with acute exacerbation (HCC)  Assessment & Plan  Ongoing respiratory treatment, inhaled steroids, rhonchi dilators, added Symbicort BID    Closed fracture of multiple ribs of both sides  Assessment & Plan  Acute and previously healed, pain control    Collapse of right lung  Assessment & Plan  Appears to be related to prior injury/trauma    Hypomagnesemia  Assessment & Plan  Replace and recheck labs    Encephalopathy- (present on admission)  Assessment & Plan  Resolved, no definitive anoxic brain injury  Follow clinically  Monitor      Thrombocytopenia (HCC)- (present on admission)  Assessment & Plan  Mild, cont to monitor    Morbid obesity (HCC)- (present on admission)  Assessment & Plan  Encourage weight loss when appropriate    Hypokalemia- (present on admission)  Assessment & Plan  3.4 today increased KCl for today    Urinary retention  Assessment & Plan  Resolved  Required I/O cath x 3, gonzalez placed but removed on 11/7, urinating without retention    Pulmonary hypertension (HCC)  Assessment & Plan  Likely from chronic pulmonary disease, diuresed appropriately    Left breast mass  Assessment & Plan  Incidental finding on CT  Discussed aspiration/biopsy, likely will be followed up outpatient but since taking so long for placement, may want to go ahead with biopsy         VTE prophylaxis: Xaralto

## 2019-11-12 NOTE — DIETARY
"Nutrition Services: Day 15 of admit.  68 yo female admitted with cardiac arrest.  Follow up for adequacy of nutritional intake.    Evaluation:  1. Dietitian has been following pt since admit.  2. SLP downgraded diet on 11/4 to dysphagia 1 (pureed) with thick liquids. Meals are with 1:1 supervision. Po intake has seemed to decline since then.  3. SLP today reports concern for minimal intake. Very little documentation in ADL's. SLP noted today's breakfast as less than 25%. Last meal recorded prior to that was 11/9 when pt refused breakfast and lunch. 11/7 breakfast was refused and dinner %.   4. Weights documented indicate pt weighed 125.9 kg (bed scale) on admit 10/29.  Last weight 11/9 144.2 kg (bed scale) indicating an increase of 18.3 kg since admit.  Question accuracy of weights. Bed scale is now noted to be \"not working.\"  Pt would benefit from stand up scale wt if possible or new bed. Pt is noted to have 2+ edema to bilateral lower extremities and is receiving lasix.    5. Discussed pt with RN and requested boost plus supplements with meals.      Malnutrition Risk: BMI 49.78 Morbid obesity    Recommendations/Plan:  1. encourage intake of meals and supplements  2. document intake of all meals and supplements as % taken in ADL's to provide interdisciplinary communication across all shifts   3. Obtain stand up scale weight if possible or consider new bed as bed scale is not working.  4. Nutrition rep will continue to see patient for ongoing meal and snack preferences.       "

## 2019-11-12 NOTE — PROGRESS NOTES
· 2 RN skin check complete with AJ RN  · Devices in place oxygen tubing, pure wick   · Skin assessed under devices yes  · Confirmed pressure ulcers found on n/a  · New potential pressure ulcers noted on none.  · The following interventions are in place waffle cushion, q 2 turns, incontinent care.

## 2019-11-12 NOTE — CARE PLAN
Problem: Oxygenation:  Goal: Maintain adequate oxygenation dependent on patient condition  Outcome: PROGRESSING AS EXPECTED     Problem: Bronchopulmonary Hygiene:  Goal: Increase mobilization of retained secretions  Outcome: PROGRESSING AS EXPECTED     Problem: Bronchoconstriction:  Goal: Improve in air movement and diminished wheezing  Outcome: PROGRESSING AS EXPECTED     Symbicort BID   Duo QID

## 2019-11-12 NOTE — CARE PLAN
Problem: Oxygenation:  Goal: Maintain adequate oxygenation dependent on patient condition  Intervention: Manage oxygen therapy by monitoring pulse oximetry and/or ABG values  Note:   3L NC     Problem: Bronchopulmonary Hygiene:  Goal: Increase mobilization of retained secretions  Intervention: Perform bronchopulmonary therapy as indicated by assessment  Note:   Flutter     Problem: Bronchoconstriction:  Goal: Improve in air movement and diminished wheezing  Intervention: Implement inhaled treatments  Note:   Duo QID  Symbicort

## 2019-11-13 LAB
ANION GAP SERPL CALC-SCNC: 6 MMOL/L (ref 0–11.9)
BUN SERPL-MCNC: 33 MG/DL (ref 8–22)
CALCIUM SERPL-MCNC: 10.5 MG/DL (ref 8.5–10.5)
CHLORIDE SERPL-SCNC: 101 MMOL/L (ref 96–112)
CO2 SERPL-SCNC: 38 MMOL/L (ref 20–33)
CREAT SERPL-MCNC: 0.95 MG/DL (ref 0.5–1.4)
EKG IMPRESSION: NORMAL
GLUCOSE SERPL-MCNC: 157 MG/DL (ref 65–99)
MAGNESIUM SERPL-MCNC: 2 MG/DL (ref 1.5–2.5)
POTASSIUM SERPL-SCNC: 3.6 MMOL/L (ref 3.6–5.5)
SODIUM SERPL-SCNC: 145 MMOL/L (ref 135–145)
VIT B12 SERPL-MCNC: 485 PG/ML (ref 211–911)

## 2019-11-13 PROCEDURE — 94668 MNPJ CHEST WALL SBSQ: CPT

## 2019-11-13 PROCEDURE — A9270 NON-COVERED ITEM OR SERVICE: HCPCS | Performed by: HOSPITALIST

## 2019-11-13 PROCEDURE — 700101 HCHG RX REV CODE 250: Performed by: INTERNAL MEDICINE

## 2019-11-13 PROCEDURE — 700102 HCHG RX REV CODE 250 W/ 637 OVERRIDE(OP): Performed by: FAMILY MEDICINE

## 2019-11-13 PROCEDURE — 83735 ASSAY OF MAGNESIUM: CPT

## 2019-11-13 PROCEDURE — 93010 ELECTROCARDIOGRAM REPORT: CPT | Performed by: INTERNAL MEDICINE

## 2019-11-13 PROCEDURE — 99233 SBSQ HOSP IP/OBS HIGH 50: CPT | Performed by: FAMILY MEDICINE

## 2019-11-13 PROCEDURE — 94669 MECHANICAL CHEST WALL OSCILL: CPT

## 2019-11-13 PROCEDURE — 700102 HCHG RX REV CODE 250 W/ 637 OVERRIDE(OP): Performed by: INTERNAL MEDICINE

## 2019-11-13 PROCEDURE — 770020 HCHG ROOM/CARE - TELE (206)

## 2019-11-13 PROCEDURE — 700101 HCHG RX REV CODE 250: Performed by: HOSPITALIST

## 2019-11-13 PROCEDURE — 80048 BASIC METABOLIC PNL TOTAL CA: CPT

## 2019-11-13 PROCEDURE — A9270 NON-COVERED ITEM OR SERVICE: HCPCS | Performed by: INTERNAL MEDICINE

## 2019-11-13 PROCEDURE — 36415 COLL VENOUS BLD VENIPUNCTURE: CPT

## 2019-11-13 PROCEDURE — A9270 NON-COVERED ITEM OR SERVICE: HCPCS | Performed by: FAMILY MEDICINE

## 2019-11-13 PROCEDURE — 93005 ELECTROCARDIOGRAM TRACING: CPT | Performed by: FAMILY MEDICINE

## 2019-11-13 PROCEDURE — 82607 VITAMIN B-12: CPT

## 2019-11-13 PROCEDURE — 700102 HCHG RX REV CODE 250 W/ 637 OVERRIDE(OP)

## 2019-11-13 PROCEDURE — 94640 AIRWAY INHALATION TREATMENT: CPT

## 2019-11-13 PROCEDURE — 94760 N-INVAS EAR/PLS OXIMETRY 1: CPT

## 2019-11-13 PROCEDURE — A9270 NON-COVERED ITEM OR SERVICE: HCPCS

## 2019-11-13 PROCEDURE — 700102 HCHG RX REV CODE 250 W/ 637 OVERRIDE(OP): Performed by: HOSPITALIST

## 2019-11-13 RX ORDER — OMEPRAZOLE 20 MG/1
20 CAPSULE, DELAYED RELEASE ORAL DAILY
Status: DISCONTINUED | OUTPATIENT
Start: 2019-11-13 | End: 2019-11-21 | Stop reason: HOSPADM

## 2019-11-13 RX ORDER — METOPROLOL TARTRATE 1 MG/ML
5 INJECTION, SOLUTION INTRAVENOUS EVERY 6 HOURS PRN
Status: DISCONTINUED | OUTPATIENT
Start: 2019-11-13 | End: 2019-11-21 | Stop reason: HOSPADM

## 2019-11-13 RX ADMIN — ACETAMINOPHEN 325 MG: 325 TABLET, FILM COATED ORAL at 10:11

## 2019-11-13 RX ADMIN — GUAIFENESIN 600 MG: 600 TABLET, EXTENDED RELEASE ORAL at 04:18

## 2019-11-13 RX ADMIN — METOPROLOL TARTRATE 12.5 MG: 25 TABLET, FILM COATED ORAL at 04:19

## 2019-11-13 RX ADMIN — RIVAROXABAN 20 MG: 20 TABLET, FILM COATED ORAL at 17:54

## 2019-11-13 RX ADMIN — POTASSIUM CHLORIDE 20 MEQ: 20 TABLET, EXTENDED RELEASE ORAL at 17:54

## 2019-11-13 RX ADMIN — FUROSEMIDE 40 MG: 40 TABLET ORAL at 04:18

## 2019-11-13 RX ADMIN — NYSTATIN: 100000 POWDER TOPICAL at 04:29

## 2019-11-13 RX ADMIN — IPRATROPIUM BROMIDE AND ALBUTEROL SULFATE 3 ML: .5; 3 SOLUTION RESPIRATORY (INHALATION) at 06:39

## 2019-11-13 RX ADMIN — GUAIFENESIN 600 MG: 600 TABLET, EXTENDED RELEASE ORAL at 17:54

## 2019-11-13 RX ADMIN — LEVOTHYROXINE SODIUM 175 MCG: 125 TABLET ORAL at 04:18

## 2019-11-13 RX ADMIN — BUDESONIDE AND FORMOTEROL FUMARATE DIHYDRATE 2 PUFF: 160; 4.5 AEROSOL RESPIRATORY (INHALATION) at 06:39

## 2019-11-13 RX ADMIN — IPRATROPIUM BROMIDE AND ALBUTEROL SULFATE 3 ML: .5; 3 SOLUTION RESPIRATORY (INHALATION) at 21:05

## 2019-11-13 RX ADMIN — BUDESONIDE AND FORMOTEROL FUMARATE DIHYDRATE 2 PUFF: 160; 4.5 AEROSOL RESPIRATORY (INHALATION) at 21:05

## 2019-11-13 RX ADMIN — POTASSIUM CHLORIDE 20 MEQ: 20 TABLET, EXTENDED RELEASE ORAL at 04:20

## 2019-11-13 RX ADMIN — LIDOCAINE 1 PATCH: 50 PATCH TOPICAL at 15:19

## 2019-11-13 RX ADMIN — OMEPRAZOLE 20 MG: 20 CAPSULE, DELAYED RELEASE ORAL at 17:54

## 2019-11-13 RX ADMIN — ACETAMINOPHEN 325 MG: 325 TABLET, FILM COATED ORAL at 19:49

## 2019-11-13 RX ADMIN — NYSTATIN: 100000 POWDER TOPICAL at 18:03

## 2019-11-13 RX ADMIN — IPRATROPIUM BROMIDE AND ALBUTEROL SULFATE 3 ML: .5; 3 SOLUTION RESPIRATORY (INHALATION) at 13:12

## 2019-11-13 ASSESSMENT — ENCOUNTER SYMPTOMS
DIAPHORESIS: 0
NECK PAIN: 0
CHILLS: 0
SPEECH CHANGE: 0
SENSORY CHANGE: 0
PALPITATIONS: 0
HEADACHES: 0
SORE THROAT: 0
VOMITING: 0
DIZZINESS: 0
FEVER: 0
ABDOMINAL PAIN: 0
COUGH: 0
DIARRHEA: 0
HEARTBURN: 0
WHEEZING: 0
NERVOUS/ANXIOUS: 0
BACK PAIN: 0
BLURRED VISION: 0
FOCAL WEAKNESS: 0
FLANK PAIN: 0
NAUSEA: 0
SHORTNESS OF BREATH: 0
WEAKNESS: 1

## 2019-11-13 NOTE — PROGRESS NOTES
Pt stable, VSS. Irritable and frustrated most of the shift. Needs encouragement to participate in care. Q2hr turns, BM during the shift. Purewick for urinary incontinence. Dysphagia precaution, Needs 1:1 supervision for feeding.      Pills whole in chocolate pudding.

## 2019-11-13 NOTE — CARE PLAN
Problem: Oxygenation:  Goal: Maintain adequate oxygenation dependent on patient condition  Outcome: PROGRESSING AS EXPECTED     Problem: Bronchopulmonary Hygiene:  Goal: Increase mobilization of retained secretions  Outcome: PROGRESSING AS EXPECTED     Problem: Bronchoconstriction:  Goal: Improve in air movement and diminished wheezing  Outcome: PROGRESSING AS EXPECTED       Duo QID/flutter   Symbicort BID

## 2019-11-13 NOTE — PROGRESS NOTES
[facility] Medicine Daily Progress Note    Date of Service  11/13/2019    Chief Complaint  69 y.o. female admitted 10/28/2019 with cardiorespiratory arrest.    Hospital Course  Admitted with acute on chronic respiratory failure requiring intubation. PEA arrest after intubation. Transferred to [facility] however developed V. tach arrest and was shocked.  Eventually extubated.  Developed paroxysmal atrial flutter, initially placed on amiodarone.  Noted to have bilateral acute rib fractures, and metatarsal fractures on the right foot.    Interval Problem Update  Respiratory failure - O2 3 lpm NC  A flutter - currently sinus  HTN - sbp   Breast mass - lengthy session with patient on need for biopsy, patient currently undecided    Consultants/Specialty  Critical care   Cardiology  Orthopedic surgery    Code Status  Full    Disposition  SNF    Review of Systems  Review of Systems   Constitutional: Positive for malaise/fatigue. Negative for chills, diaphoresis and fever.   HENT: Negative for hearing loss and sore throat.    Eyes: Negative for blurred vision.   Respiratory: Negative for cough, shortness of breath and wheezing.    Cardiovascular: Positive for leg swelling. Negative for chest pain and palpitations.   Gastrointestinal: Negative for abdominal pain, diarrhea, heartburn, nausea and vomiting.   Genitourinary: Negative for dysuria, flank pain and hematuria.   Musculoskeletal: Positive for joint pain. Negative for back pain and neck pain.   Skin: Negative for rash.   Neurological: Positive for weakness. Negative for dizziness, sensory change, speech change, focal weakness and headaches.   Psychiatric/Behavioral: The patient is not nervous/anxious.         Physical Exam  Temp:  [36.8 °C (98.3 °F)-37.1 °C (98.7 °F)] 36.8 °C (98.3 °F)  Pulse:  [77-98] 85  Resp:  [16-25] 25  BP: ()/(54-68) 98/55  SpO2:  [91 %-96 %] 92 %    Physical Exam  Constitutional:       Appearance: She is obese.   HENT:      Head:  Normocephalic and atraumatic.      Nose: No congestion.      Mouth/Throat:      Mouth: Mucous membranes are moist.   Eyes:      Conjunctiva/sclera: Conjunctivae normal.      Pupils: Pupils are equal, round, and reactive to light.   Neck:      Musculoskeletal: No muscular tenderness.   Cardiovascular:      Rate and Rhythm: Normal rate and regular rhythm.   Pulmonary:      Effort: Accessory muscle usage present.      Breath sounds: Normal breath sounds.   Abdominal:      General: Bowel sounds are normal. There is no distension.      Palpations: Abdomen is soft.      Tenderness: There is no tenderness. There is no guarding or rebound.   Musculoskeletal:      Right lower leg: Edema present.      Left lower leg: Edema present.   Lymphadenopathy:      Cervical: No cervical adenopathy.   Skin:     Findings: Bruising (Right foot) present.   Neurological:      General: No focal deficit present.      Mental Status: She is alert and oriented to person, place, and time.      Cranial Nerves: No cranial nerve deficit.         Fluids    Intake/Output Summary (Last 24 hours) at 11/13/2019 1158  Last data filed at 11/12/2019 2200  Gross per 24 hour   Intake --   Output 900 ml   Net -900 ml       Laboratory  Recent Labs     11/12/19  1201   WBC 6.6   RBC 4.55   HEMOGLOBIN 14.5   HEMATOCRIT 47.9*   .3*   MCH 31.9   MCHC 30.3*   RDW 51.5*   PLATELETCT 155*   MPV 10.5     Recent Labs     11/11/19  0157 11/12/19  1201   SODIUM 144 142   POTASSIUM 3.7 3.3*   CHLORIDE 101 98   CO2 36* 36*   GLUCOSE 116* 106*   BUN 31* 29*   CREATININE 0.87 0.84   CALCIUM 10.2 10.1                   Imaging  MR-FOOT-WITH & W/O RIGHT   Final Result      1. A nonenhancing hyperintense T1, isointense T2 1.4 x 4.1 x 4.3 cm area deep to the subcutaneous tissue about the dorsum of the foot with overlying subcutaneous edema. This is nonspecific but could relate to hematoma. Superimposed infection cannot be    entirely excluded.      2. Mildly displaced  fractures at the distal fourth and fifth metatarsals. Minimal marrow edema within no definite marrow replacement, suggesting subacute healing fractures. Osteomyelitis is considered less likely.      DX-FOOT-2- RIGHT   Final Result      Soft tissue swelling with subacute appearing fractures involving the necks of the fourth and fifth metatarsals with possible bone resorption which could be due to fractures or osteomyelitis.      DX-CHEST-PORTABLE (1 VIEW)   Final Result      1.  Progressive volume loss in the right lung, with progressive rightward mediastinal shift. Underlying pneumonia and small pleural effusion are possible in the proper clinical settings.   2.  Slight worsening of left basilar atelectasis versus consolidation. No significant left effusion.      DX-CHEST-PORTABLE (1 VIEW)   Final Result      1.  Possible improved consolidation in the right lung versus related to differences in rotation.   2.  Improved interstitial opacity/edema.   3.  Left basilar atelectasis.      DX-CHEST-PORTABLE (1 VIEW)   Final Result      1.  Removal of endotracheal tube and enteric catheters      2.  No other change      DX-CHEST-PORTABLE (1 VIEW)   Final Result      Improved left upper lobe atelectasis and consolidation      Otherwise stable right worse than left consolidation and atelectasis with probable right pleural fluid      Bilateral acute rib fractures      EC-ECHOCARDIOGRAM COMPLETE W/O CONT   Final Result      DX-ABDOMEN FOR TUBE PLACEMENT   Final Result      Feeding tube in place as noted above.      DX-CHEST-PORTABLE (1 VIEW)   Final Result      Improving right upper lobe atelectasis. No significant change otherwise.      DX-ABDOMEN FOR TUBE PLACEMENT   Final Result      Nasogastric tube in place as noted above.      CT-CTA CHEST PULMONARY ARTERY W/ RECONS   Final Result   Addendum 1 of 1   Addendum dictated on 11/2/2019 by Dr. Peng as follows:   Addendum:   In reviewing the exam there is an incidentally noted  inferomedial left    breast mass 19 x 23 mm. I discussed these findings seconds ago with the    intensivist currently covering the patient.      Final      DX-CHEST-PORTABLE (1 VIEW)   Final Result      Diffuse bilateral interstitial opacities, suggesting pulmonary edema. Additionally there are confluent and linear opacities throughout the right perihilar and upper lung field and in the left base, consistent with a combination of pneumonia and    atelectasis. All of this is considerably worse compared with the prior image.           Assessment/Plan  * Acute on chronic respiratory failure with hypoxia and hypercapnia (HCC)- (present on admission)  Assessment & Plan  Intubated 10/28/2019, extubated 10/30  Refused nocturnal BiPAP  RT protocol  PT and OT    Metatarsal bone fracture- (present on admission)  Assessment & Plan  postoperative shoe  Pain control    Chronic obstructive pulmonary disease with acute exacerbation (HCC)- (present on admission)  Assessment & Plan  Symbicort, RT protocol    Encephalopathy- (present on admission)  Assessment & Plan  Avoid benzodiazepines and anticholinergics  Frequent orientation  Avoid early morning labs  Avoid vital signs during sleep  Ambulate if possible      Hypophosphatemia- (present on admission)  Assessment & Plan  Follow level    Pulmonary hypertension (HCC)- (present on admission)  Assessment & Plan  Lasix    Left breast mass- (present on admission)  Assessment & Plan  Will need biopsy - awaiting pt's decision      Closed fracture of multiple ribs of both sides- (present on admission)  Assessment & Plan  Acute and previously healed, pain control    Collapse of right lung- (present on admission)  Assessment & Plan  resolved    Paroxysmal atrial flutter (HCC)  Assessment & Plan  Metoprolol, Xarelto    Hypomagnesemia- (present on admission)  Assessment & Plan  IV Mg given  Recheck level    Ventricular tachycardia (HCC)- (present on admission)  Assessment & Plan  Amiodarone  stopped on 11/6  Metoprolol      Vitamin B12 deficiency- (present on admission)  Assessment & Plan  Checking level    Hypokalemia- (present on admission)  Assessment & Plan  Kdur  Follow BMP    Hypertension- (present on admission)  Assessment & Plan  Decreased metoprolol to 12.5 mg twice daily with parameters placed    Other specified hypothyroidism- (present on admission)  Assessment & Plan  Synthroid increased to 175 mcg on 11/1/2019  Needs repeat TSH on 12/1/2019    Urinary retention  Assessment & Plan  Resolved    Thrombocytopenia (HCC)- (present on admission)  Assessment & Plan  Follow cbc    Morbid obesity (HCC)- (present on admission)  Assessment & Plan  Body mass index is 49.78 kg/m².       VTE prophylaxis: Xarelto

## 2019-11-13 NOTE — PROGRESS NOTES
Patient refusing bath and to be repositioned on her back/left side. Education provided and reinforced.

## 2019-11-13 NOTE — PROGRESS NOTES
Patient refused to get out of bed and ambulate, educated regarding importance.  Education reinforced throughout shift.   Patient continued to refuse.

## 2019-11-13 NOTE — CARE PLAN
Problem: Psychosocial Needs:  Goal: Level of anxiety will decrease  Outcome: PROGRESSING AS EXPECTED       Patient is more friendly and conversational today. Allowing more cares.

## 2019-11-13 NOTE — DISCHARGE PLANNING
Agency/Facility Name: [facility]  Spoke To:  Dept.  Outcome: Called [facility]. No answer and was not able to leave a voicemail.      Agency/Facility Name: [facility]  Spoke To: Yandy  Outcome: Left voicemail to follow up on referral.

## 2019-11-13 NOTE — CARE PLAN
Problem: Bowel/Gastric:  Goal: Will not experience complications related to bowel motility  Note:   Pt had large BM during the shift, continue to provide hydration and PRN bowel medication as appropriate     Problem: Psychosocial Needs:  Goal: Level of anxiety will decrease  Note:   Pt frustration acknowledged, pt more calm and cooperate after offering thickened water chilled with ice before thickening

## 2019-11-14 PROCEDURE — 94668 MNPJ CHEST WALL SBSQ: CPT

## 2019-11-14 PROCEDURE — 99232 SBSQ HOSP IP/OBS MODERATE 35: CPT | Performed by: INTERNAL MEDICINE

## 2019-11-14 PROCEDURE — A9270 NON-COVERED ITEM OR SERVICE: HCPCS | Performed by: HOSPITALIST

## 2019-11-14 PROCEDURE — 700101 HCHG RX REV CODE 250: Performed by: INTERNAL MEDICINE

## 2019-11-14 PROCEDURE — 94640 AIRWAY INHALATION TREATMENT: CPT

## 2019-11-14 PROCEDURE — 700102 HCHG RX REV CODE 250 W/ 637 OVERRIDE(OP): Performed by: INTERNAL MEDICINE

## 2019-11-14 PROCEDURE — 700101 HCHG RX REV CODE 250: Performed by: HOSPITALIST

## 2019-11-14 PROCEDURE — 700102 HCHG RX REV CODE 250 W/ 637 OVERRIDE(OP): Performed by: HOSPITALIST

## 2019-11-14 PROCEDURE — 770020 HCHG ROOM/CARE - TELE (206)

## 2019-11-14 PROCEDURE — A9270 NON-COVERED ITEM OR SERVICE: HCPCS | Performed by: INTERNAL MEDICINE

## 2019-11-14 PROCEDURE — 97530 THERAPEUTIC ACTIVITIES: CPT

## 2019-11-14 PROCEDURE — 700102 HCHG RX REV CODE 250 W/ 637 OVERRIDE(OP): Performed by: FAMILY MEDICINE

## 2019-11-14 PROCEDURE — A9270 NON-COVERED ITEM OR SERVICE: HCPCS | Performed by: FAMILY MEDICINE

## 2019-11-14 RX ADMIN — FUROSEMIDE 40 MG: 40 TABLET ORAL at 05:53

## 2019-11-14 RX ADMIN — BUDESONIDE AND FORMOTEROL FUMARATE DIHYDRATE 2 PUFF: 160; 4.5 AEROSOL RESPIRATORY (INHALATION) at 19:10

## 2019-11-14 RX ADMIN — GUAIFENESIN 600 MG: 600 TABLET, EXTENDED RELEASE ORAL at 18:07

## 2019-11-14 RX ADMIN — METOPROLOL TARTRATE 12.5 MG: 25 TABLET, FILM COATED ORAL at 18:04

## 2019-11-14 RX ADMIN — POTASSIUM CHLORIDE 20 MEQ: 20 TABLET, EXTENDED RELEASE ORAL at 05:57

## 2019-11-14 RX ADMIN — IPRATROPIUM BROMIDE AND ALBUTEROL SULFATE 3 ML: .5; 3 SOLUTION RESPIRATORY (INHALATION) at 14:14

## 2019-11-14 RX ADMIN — IPRATROPIUM BROMIDE AND ALBUTEROL SULFATE 3 ML: .5; 3 SOLUTION RESPIRATORY (INHALATION) at 10:02

## 2019-11-14 RX ADMIN — IPRATROPIUM BROMIDE AND ALBUTEROL SULFATE 3 ML: .5; 3 SOLUTION RESPIRATORY (INHALATION) at 19:10

## 2019-11-14 RX ADMIN — IPRATROPIUM BROMIDE AND ALBUTEROL SULFATE 3 ML: .5; 3 SOLUTION RESPIRATORY (INHALATION) at 06:34

## 2019-11-14 RX ADMIN — RIVAROXABAN 20 MG: 20 TABLET, FILM COATED ORAL at 18:04

## 2019-11-14 RX ADMIN — OMEPRAZOLE 20 MG: 20 CAPSULE, DELAYED RELEASE ORAL at 05:54

## 2019-11-14 RX ADMIN — NYSTATIN: 100000 POWDER TOPICAL at 06:00

## 2019-11-14 RX ADMIN — ACETAMINOPHEN 325 MG: 325 TABLET, FILM COATED ORAL at 06:09

## 2019-11-14 RX ADMIN — BUDESONIDE AND FORMOTEROL FUMARATE DIHYDRATE 2 PUFF: 160; 4.5 AEROSOL RESPIRATORY (INHALATION) at 06:35

## 2019-11-14 RX ADMIN — GUAIFENESIN 600 MG: 600 TABLET, EXTENDED RELEASE ORAL at 05:55

## 2019-11-14 RX ADMIN — ACETAMINOPHEN 325 MG: 325 TABLET, FILM COATED ORAL at 20:25

## 2019-11-14 RX ADMIN — NYSTATIN: 100000 POWDER TOPICAL at 18:00

## 2019-11-14 RX ADMIN — POTASSIUM CHLORIDE 20 MEQ: 20 TABLET, EXTENDED RELEASE ORAL at 18:00

## 2019-11-14 RX ADMIN — LIDOCAINE 1 PATCH: 50 PATCH TOPICAL at 20:26

## 2019-11-14 RX ADMIN — LEVOTHYROXINE SODIUM 175 MCG: 125 TABLET ORAL at 05:55

## 2019-11-14 ASSESSMENT — ENCOUNTER SYMPTOMS
WHEEZING: 0
DIAPHORESIS: 0
DIARRHEA: 0
FEVER: 0
FOCAL WEAKNESS: 0
SPEECH CHANGE: 0
BACK PAIN: 0
FLANK PAIN: 0
NAUSEA: 0
ABDOMINAL PAIN: 0
SHORTNESS OF BREATH: 0
COUGH: 0
DIZZINESS: 0
HEADACHES: 0
VOMITING: 0
PALPITATIONS: 0
HEARTBURN: 0
CHILLS: 0
NECK PAIN: 0
SENSORY CHANGE: 0
WEAKNESS: 1
NERVOUS/ANXIOUS: 0
BLURRED VISION: 0
SORE THROAT: 0

## 2019-11-14 ASSESSMENT — COGNITIVE AND FUNCTIONAL STATUS - GENERAL
STANDING UP FROM CHAIR USING ARMS: A LOT
MOVING FROM LYING ON BACK TO SITTING ON SIDE OF FLAT BED: UNABLE
CLIMB 3 TO 5 STEPS WITH RAILING: TOTAL
SUGGESTED CMS G CODE MODIFIER MOBILITY: CM
WALKING IN HOSPITAL ROOM: TOTAL
TURNING FROM BACK TO SIDE WHILE IN FLAT BAD: UNABLE
MOVING TO AND FROM BED TO CHAIR: UNABLE
MOBILITY SCORE: 7

## 2019-11-14 ASSESSMENT — GAIT ASSESSMENTS: GAIT LEVEL OF ASSIST: REFUSED

## 2019-11-14 NOTE — PROGRESS NOTES
Assumed care of patient at bedside report from NOC RN. Updated on POC. Patient currently A & O x 4; on 6 L O2 nasal cannula; up max assist; without complaints of acute pain. Call light within reach. Whiteboard updated. Fall precautions in place. Bed locked and in lowest position. All questions answered. No other needs indicated at this time.

## 2019-11-14 NOTE — PROGRESS NOTES
Received report from day shift RNMelissa. Assumed care of pt. Pt reports 10/10 generalized pain at this time. Updated pt on plan of care. Pt resting comfortably in bed. Bed alarm in place. Educated on use of call light. Hourly rounding and continuous monitoring in place.

## 2019-11-14 NOTE — PROGRESS NOTES
[facility] Medicine Daily Progress Note    Date of Service  11/14/2019    Chief Complaint  69 y.o. female admitted 10/28/2019 with cardiorespiratory arrest.    Hospital Course  69-year-old female with history of COPD on home oxygen, and hypothyroidism presented 10/28 with cardiac arrest, initial intubation went out in her home oxygen she was sent home with oxygen tank however she was found 4 hours later on consciousness outside of her house, the patient had significant hypoxia with hypercapnia, she was intubated she had a PEA arrest s subsequently obtained RosC and then transferred to [facility] for ICU management, on the admission-chest x-ray markedly prominent interstitial lung markings bilateral could be related to chronic interstitial lung disease or edema, her HCO2 was 38 and creatinine 0.8 and no leukocytosis, respiratory therapy for COPD with inhalers and Lasix have been started however developed V. tach arrest and was shocked.  She had improved and eventually extubated.  Developed paroxysmal atrial flutter, initially placed on amiodarone and later discontinued and started on metoprolol 12.5 twice daily and Xarelto was started.    Noted to have bilateral acute rib fractures, and metatarsal fractures on the right foot.    Interval Problem Update  On her baseline O2 3 L NC  No complaints   Reviewed her labs   Continue lasix and RT     Consultants/Specialty  Critical care   Cardiology  Orthopedic surgery    Code Status  Full    Disposition  SNF    Review of Systems  Review of Systems   Constitutional: Positive for malaise/fatigue. Negative for chills, diaphoresis and fever.   HENT: Negative for hearing loss and sore throat.    Eyes: Negative for blurred vision.   Respiratory: Negative for cough, shortness of breath and wheezing.    Cardiovascular: Positive for leg swelling. Negative for chest pain and palpitations.        Improved    Gastrointestinal: Negative for abdominal pain, diarrhea, heartburn, nausea and  vomiting.   Genitourinary: Negative for dysuria, flank pain and hematuria.   Musculoskeletal: Positive for joint pain. Negative for back pain and neck pain.   Skin: Negative for rash.   Neurological: Positive for weakness. Negative for dizziness, sensory change, speech change, focal weakness and headaches.   Psychiatric/Behavioral: The patient is not nervous/anxious.         Physical Exam  Temp:  [36.1 °C (97 °F)-37.1 °C (98.8 °F)] 36.5 °C (97.7 °F)  Pulse:  [] 102  Resp:  [12-28] 16  BP: ()/(56-77) 120/66  SpO2:  [89 %-96 %] 96 %    Physical Exam  Constitutional:       Appearance: She is obese.   HENT:      Head: Normocephalic and atraumatic.      Nose: No congestion.      Mouth/Throat:      Mouth: Mucous membranes are moist.   Eyes:      Conjunctiva/sclera: Conjunctivae normal.      Pupils: Pupils are equal, round, and reactive to light.   Neck:      Musculoskeletal: No muscular tenderness.   Cardiovascular:      Rate and Rhythm: Normal rate and regular rhythm.   Pulmonary:      Effort: Accessory muscle usage present.      Breath sounds: Normal breath sounds.   Abdominal:      General: Bowel sounds are normal. There is no distension.      Palpations: Abdomen is soft.      Tenderness: There is no tenderness. There is no guarding or rebound.   Musculoskeletal:      Right lower leg: Edema present.      Left lower leg: Edema present.   Lymphadenopathy:      Cervical: No cervical adenopathy.   Skin:     Findings: Bruising (Right foot) present.   Neurological:      General: No focal deficit present.      Mental Status: She is alert and oriented to person, place, and time.      Cranial Nerves: No cranial nerve deficit.         Fluids    Intake/Output Summary (Last 24 hours) at 11/14/2019 1534  Last data filed at 11/14/2019 0601  Gross per 24 hour   Intake --   Output 600 ml   Net -600 ml       Laboratory  Recent Labs     11/12/19  1201   WBC 6.6   RBC 4.55   HEMOGLOBIN 14.5   HEMATOCRIT 47.9*   .3*    MCH 31.9   MCHC 30.3*   RDW 51.5*   PLATELETCT 155*   MPV 10.5     Recent Labs     11/12/19  1201 11/13/19  1247   SODIUM 142 145   POTASSIUM 3.3* 3.6   CHLORIDE 98 101   CO2 36* 38*   GLUCOSE 106* 157*   BUN 29* 33*   CREATININE 0.84 0.95   CALCIUM 10.1 10.5                   Imaging  MR-FOOT-WITH & W/O RIGHT   Final Result      1. A nonenhancing hyperintense T1, isointense T2 1.4 x 4.1 x 4.3 cm area deep to the subcutaneous tissue about the dorsum of the foot with overlying subcutaneous edema. This is nonspecific but could relate to hematoma. Superimposed infection cannot be    entirely excluded.      2. Mildly displaced fractures at the distal fourth and fifth metatarsals. Minimal marrow edema within no definite marrow replacement, suggesting subacute healing fractures. Osteomyelitis is considered less likely.      DX-FOOT-2- RIGHT   Final Result      Soft tissue swelling with subacute appearing fractures involving the necks of the fourth and fifth metatarsals with possible bone resorption which could be due to fractures or osteomyelitis.      DX-CHEST-PORTABLE (1 VIEW)   Final Result      1.  Progressive volume loss in the right lung, with progressive rightward mediastinal shift. Underlying pneumonia and small pleural effusion are possible in the proper clinical settings.   2.  Slight worsening of left basilar atelectasis versus consolidation. No significant left effusion.      DX-CHEST-PORTABLE (1 VIEW)   Final Result      1.  Possible improved consolidation in the right lung versus related to differences in rotation.   2.  Improved interstitial opacity/edema.   3.  Left basilar atelectasis.      DX-CHEST-PORTABLE (1 VIEW)   Final Result      1.  Removal of endotracheal tube and enteric catheters      2.  No other change      DX-CHEST-PORTABLE (1 VIEW)   Final Result      Improved left upper lobe atelectasis and consolidation      Otherwise stable right worse than left consolidation and atelectasis with  probable right pleural fluid      Bilateral acute rib fractures      EC-ECHOCARDIOGRAM COMPLETE W/O CONT   Final Result      DX-ABDOMEN FOR TUBE PLACEMENT   Final Result      Feeding tube in place as noted above.      DX-CHEST-PORTABLE (1 VIEW)   Final Result      Improving right upper lobe atelectasis. No significant change otherwise.      DX-ABDOMEN FOR TUBE PLACEMENT   Final Result      Nasogastric tube in place as noted above.      CT-CTA CHEST PULMONARY ARTERY W/ RECONS   Final Result   Addendum 1 of 1   Addendum dictated on 11/2/2019 by Dr. Peng as follows:   Addendum:   In reviewing the exam there is an incidentally noted inferomedial left    breast mass 19 x 23 mm. I discussed these findings seconds ago with the    intensivist currently covering the patient.      Final      DX-CHEST-PORTABLE (1 VIEW)   Final Result      Diffuse bilateral interstitial opacities, suggesting pulmonary edema. Additionally there are confluent and linear opacities throughout the right perihilar and upper lung field and in the left base, consistent with a combination of pneumonia and    atelectasis. All of this is considerably worse compared with the prior image.           Assessment/Plan  * Acute on chronic respiratory failure with hypoxia and hypercapnia (HCC)- (present on admission)  Assessment & Plan  Intubated 10/28/2019, extubated 10/30  Continue improving  Ech normal ejection fraction 55% and no pulmonary hypertension  No kidney failure  Refused nocturnal BiPAP  RT protocol: And inhalers  Continue Lasix 40 mg p.o. daily at this time and reassess volume status daily.    Metatarsal bone fracture- (present on admission)  Assessment & Plan  postoperative shoe  Pain control    Chronic obstructive pulmonary disease with acute exacerbation (HCC)- (present on admission)  Assessment & Plan  Symbicort, RT protocol  No signs of pulmonary hypertension on the echo  Continue Lasix and inhalers at this time    Encephalopathy- (present on  admission)  Assessment & Plan  Delirium due to acute respiratory failure   Improving   Alert and oriented  avoid benzodiazepines and anticholinergics  Frequent orientation  Avoid early morning labs  Avoid vital signs during sleep  Ambulate if possible      Hypophosphatemia- (present on admission)  Assessment & Plan  Follow level    Pulmonary hypertension (HCC)- (present on admission)  Assessment & Plan  Lasix    Left breast mass- (present on admission)  Assessment & Plan  Will need biopsy - awaiting pt's decision      Closed fracture of multiple ribs of both sides- (present on admission)  Assessment & Plan  Acute and previously healed, pain control    Collapse of right lung- (present on admission)  Assessment & Plan  resolved    Paroxysmal atrial flutter (HCC)  Assessment & Plan  ,No RVR, stable he   Continue Metoprolol, Xarelto    Hypomagnesemia- (present on admission)  Assessment & Plan  IV Mg given  Recheck level    Ventricular tachycardia (HCC)- (present on admission)  Assessment & Plan  Amiodarone stopped on 11/6  Metoprolol 12.5 twice daily        Vitamin B12 deficiency- (present on admission)  Assessment & Plan  Resolved    Hypokalemia- (present on admission)  Assessment & Plan  Kdur  Follow BMP    Hypertension- (present on admission)  Assessment & Plan  Decreased metoprolol to 12.5 mg twice daily with parameters placed  Controlled continue monitoring    Other specified hypothyroidism- (present on admission)  Assessment & Plan  Synthroid increased to 175 mcg on 11/1/2019  Needs repeat TSH on 12/1/2019    Urinary retention  Assessment & Plan  Resolved    Thrombocytopenia (HCC)- (present on admission)  Assessment & Plan  Follow cbc    Morbid obesity (HCC)- (present on admission)  Assessment & Plan  Body mass index is 49.78 kg/m².       VTE prophylaxis: Xarelto    The case was discussed with the nurse and multidisciplinary team.

## 2019-11-14 NOTE — THERAPY
Physical Therapy Contact Note    PT treatment attempted. Patient refused, requested rest, and irritable when provided education regarding importance of mobility. Will re attempt as appropriate and able.     Marie Romo, PT, DPT  [phone]

## 2019-11-14 NOTE — CARE PLAN
Problem: Safety  Goal: Will remain free from injury  Outcome: PROGRESSING AS EXPECTED   Treaded socks in use  Call light within reach and patient calls appropriately  Medication education provided prior to administration  Medications administered as ordered  Bed alarm on and bed locked in lowest position    Problem: Knowledge Deficit  Goal: Knowledge of disease process/condition, treatment plan, diagnostic tests, and medications will improve  Outcome: PROGRESSING AS EXPECTED  POC discussed w/ pt at beginning of shift, pt verbalized understanding and encouraged to call for assistance

## 2019-11-14 NOTE — PROGRESS NOTES
Monitor Summary:   18/08/34     Rhythm: SR/ST-aflutt , converted to aflutt @ 2214, converted back to SR @ 2341         Ectopy: PVC

## 2019-11-14 NOTE — DISCHARGE PLANNING
Agency/Facility Name: [facility]  Spoke To: Yandy  Outcome: Patient declined due to care exceeding capacity

## 2019-11-14 NOTE — PROGRESS NOTES
2 RN Skin Check    2 RN skin check complete.   Devices in place: SCDs and oxymask, purwick.  Skin assessed under devices: yes.  Confirmed pressure ulcers found on: n/a.  New potential pressure ulcers noted on n/a. Wound consult placed Yes, for existing wound to R foot.  The following interventions in place Mepilex, Barrier cream and Waffle bed overlay, q2 turning.    Moisture associated redness underneath bilat breasts and panus.   Sacrum red, blanching. Mepilex in place.  BL elbows red, blanching.  Ecchymosis to BL feet. Large wound/blister to top of R foot w/ dressing orders in place. Pillow placed between knees to offload pressure.  Heels red, slightly boggy but blanching. Pt refusing to float heels on pillow as it is painful.

## 2019-11-14 NOTE — PROGRESS NOTES
Patient noted to have a HR of 48 on pulse ox monitor, with RR of 28, /56, a febrile, 89% on 3L oxy mask.     Apical HR assessed, writer appreciated a HR of 60-70s but very irregular.     MD Alaina notified, EKG showed  atrial fib/flutter with PVCs.     Transfer orders placed.     SBAR given to Tele RN at bedside.     Lizbeth Noland  notified of transfer per patient request.

## 2019-11-14 NOTE — CARE PLAN
Problem: Safety  Goal: Will remain free from injury  Outcome: PROGRESSING AS EXPECTED  Goal: Will remain free from falls  Outcome: PROGRESSING AS EXPECTED   Fall precautions in place. Bed locked and in lowest position. Personal belongings and call light within reach.   Problem: Infection  Goal: Will remain free from infection  Outcome: PROGRESSING AS EXPECTED   Standard precautions in place.

## 2019-11-15 ENCOUNTER — APPOINTMENT (OUTPATIENT)
Dept: RADIOLOGY | Facility: MEDICAL CENTER | Age: 69
DRG: 208 | End: 2019-11-15
Attending: INTERNAL MEDICINE
Payer: MEDICARE

## 2019-11-15 LAB
ANION GAP SERPL CALC-SCNC: 6 MMOL/L (ref 0–11.9)
BASE EXCESS BLDA CALC-SCNC: 6 MMOL/L (ref -4–3)
BASOPHILS # BLD AUTO: 0.6 % (ref 0–1.8)
BASOPHILS # BLD: 0.04 K/UL (ref 0–0.12)
BODY TEMPERATURE: ABNORMAL CENTIGRADE
BUN SERPL-MCNC: 31 MG/DL (ref 8–22)
CALCIUM SERPL-MCNC: 11.9 MG/DL (ref 8.5–10.5)
CHLORIDE SERPL-SCNC: 95 MMOL/L (ref 96–112)
CO2 SERPL-SCNC: 42 MMOL/L (ref 20–33)
COMMENT 1642: NORMAL
CREAT SERPL-MCNC: 0.9 MG/DL (ref 0.5–1.4)
EOSINOPHIL # BLD AUTO: 0.15 K/UL (ref 0–0.51)
EOSINOPHIL NFR BLD: 2.1 % (ref 0–6.9)
ERYTHROCYTE [DISTWIDTH] IN BLOOD BY AUTOMATED COUNT: 52.4 FL (ref 35.9–50)
FOLATE SERPL-MCNC: 7.3 NG/ML
GLUCOSE SERPL-MCNC: 112 MG/DL (ref 65–99)
HCO3 BLDA-SCNC: 34 MMOL/L (ref 17–25)
HCT VFR BLD AUTO: 49.9 % (ref 37–47)
HGB BLD-MCNC: 14.9 G/DL (ref 12–16)
IMM GRANULOCYTES # BLD AUTO: 0.07 K/UL (ref 0–0.11)
IMM GRANULOCYTES NFR BLD AUTO: 1 % (ref 0–0.9)
LYMPHOCYTES # BLD AUTO: 0.78 K/UL (ref 1–4.8)
LYMPHOCYTES NFR BLD: 11.2 % (ref 22–41)
MAGNESIUM SERPL-MCNC: 2 MG/DL (ref 1.5–2.5)
MCH RBC QN AUTO: 31.7 PG (ref 27–33)
MCHC RBC AUTO-ENTMCNC: 29.9 G/DL (ref 33.6–35)
MCV RBC AUTO: 106.2 FL (ref 81.4–97.8)
MONOCYTES # BLD AUTO: 0.46 K/UL (ref 0–0.85)
MONOCYTES NFR BLD AUTO: 6.6 % (ref 0–13.4)
MORPHOLOGY BLD-IMP: NORMAL
NEUTROPHILS # BLD AUTO: 5.49 K/UL (ref 2–7.15)
NEUTROPHILS NFR BLD: 78.5 % (ref 44–72)
NRBC # BLD AUTO: 0 K/UL
NRBC BLD-RTO: 0 /100 WBC
PCO2 BLDA: 59 MMHG (ref 26–37)
PH BLDA: 7.38 [PH] (ref 7.4–7.5)
PLATELET # BLD AUTO: 152 K/UL (ref 164–446)
PLATELET BLD QL SMEAR: NORMAL
PMV BLD AUTO: 11.3 FL (ref 9–12.9)
PO2 BLDA: 61.4 MMHG (ref 64–87)
POTASSIUM SERPL-SCNC: 3.8 MMOL/L (ref 3.6–5.5)
RBC # BLD AUTO: 4.7 M/UL (ref 4.2–5.4)
RBC BLD AUTO: NORMAL
SAO2 % BLDA: 91.4 % (ref 93–99)
SODIUM SERPL-SCNC: 143 MMOL/L (ref 135–145)
WBC # BLD AUTO: 7 K/UL (ref 4.8–10.8)

## 2019-11-15 PROCEDURE — 700101 HCHG RX REV CODE 250: Performed by: INTERNAL MEDICINE

## 2019-11-15 PROCEDURE — 700102 HCHG RX REV CODE 250 W/ 637 OVERRIDE(OP): Performed by: INTERNAL MEDICINE

## 2019-11-15 PROCEDURE — 94640 AIRWAY INHALATION TREATMENT: CPT

## 2019-11-15 PROCEDURE — 83735 ASSAY OF MAGNESIUM: CPT

## 2019-11-15 PROCEDURE — A9270 NON-COVERED ITEM OR SERVICE: HCPCS | Performed by: HOSPITALIST

## 2019-11-15 PROCEDURE — 302146: Performed by: INTERNAL MEDICINE

## 2019-11-15 PROCEDURE — 99232 SBSQ HOSP IP/OBS MODERATE 35: CPT | Performed by: INTERNAL MEDICINE

## 2019-11-15 PROCEDURE — A9270 NON-COVERED ITEM OR SERVICE: HCPCS | Performed by: INTERNAL MEDICINE

## 2019-11-15 PROCEDURE — 82746 ASSAY OF FOLIC ACID SERUM: CPT

## 2019-11-15 PROCEDURE — 700102 HCHG RX REV CODE 250 W/ 637 OVERRIDE(OP): Performed by: FAMILY MEDICINE

## 2019-11-15 PROCEDURE — 80048 BASIC METABOLIC PNL TOTAL CA: CPT

## 2019-11-15 PROCEDURE — 94668 MNPJ CHEST WALL SBSQ: CPT

## 2019-11-15 PROCEDURE — 700101 HCHG RX REV CODE 250: Performed by: FAMILY MEDICINE

## 2019-11-15 PROCEDURE — 700101 HCHG RX REV CODE 250: Performed by: HOSPITALIST

## 2019-11-15 PROCEDURE — 700102 HCHG RX REV CODE 250 W/ 637 OVERRIDE(OP): Performed by: HOSPITALIST

## 2019-11-15 PROCEDURE — 770020 HCHG ROOM/CARE - TELE (206)

## 2019-11-15 PROCEDURE — A9270 NON-COVERED ITEM OR SERVICE: HCPCS | Performed by: FAMILY MEDICINE

## 2019-11-15 PROCEDURE — 93005 ELECTROCARDIOGRAM TRACING: CPT | Performed by: INTERNAL MEDICINE

## 2019-11-15 PROCEDURE — 85025 COMPLETE CBC W/AUTO DIFF WBC: CPT

## 2019-11-15 PROCEDURE — 71045 X-RAY EXAM CHEST 1 VIEW: CPT

## 2019-11-15 PROCEDURE — 36415 COLL VENOUS BLD VENIPUNCTURE: CPT

## 2019-11-15 PROCEDURE — 82803 BLOOD GASES ANY COMBINATION: CPT

## 2019-11-15 RX ORDER — IPRATROPIUM BROMIDE AND ALBUTEROL SULFATE 2.5; .5 MG/3ML; MG/3ML
3 SOLUTION RESPIRATORY (INHALATION)
Status: DISCONTINUED | OUTPATIENT
Start: 2019-11-15 | End: 2019-11-21 | Stop reason: HOSPADM

## 2019-11-15 RX ORDER — FUROSEMIDE 20 MG/1
20 TABLET ORAL
Status: DISCONTINUED | OUTPATIENT
Start: 2019-11-15 | End: 2019-11-16

## 2019-11-15 RX ADMIN — METOPROLOL TARTRATE 12.5 MG: 25 TABLET, FILM COATED ORAL at 05:23

## 2019-11-15 RX ADMIN — LIDOCAINE 1 PATCH: 50 PATCH TOPICAL at 17:45

## 2019-11-15 RX ADMIN — METOPROLOL TARTRATE 5 MG: 5 INJECTION, SOLUTION INTRAVENOUS at 21:00

## 2019-11-15 RX ADMIN — GUAIFENESIN 600 MG: 600 TABLET, EXTENDED RELEASE ORAL at 17:43

## 2019-11-15 RX ADMIN — IPRATROPIUM BROMIDE AND ALBUTEROL SULFATE 3 ML: .5; 3 SOLUTION RESPIRATORY (INHALATION) at 10:15

## 2019-11-15 RX ADMIN — FUROSEMIDE 20 MG: 20 TABLET ORAL at 10:39

## 2019-11-15 RX ADMIN — METOPROLOL TARTRATE 12.5 MG: 25 TABLET, FILM COATED ORAL at 17:44

## 2019-11-15 RX ADMIN — POTASSIUM CHLORIDE 20 MEQ: 20 TABLET, EXTENDED RELEASE ORAL at 05:25

## 2019-11-15 RX ADMIN — IPRATROPIUM BROMIDE AND ALBUTEROL SULFATE 3 ML: .5; 3 SOLUTION RESPIRATORY (INHALATION) at 19:36

## 2019-11-15 RX ADMIN — BUDESONIDE AND FORMOTEROL FUMARATE DIHYDRATE 2 PUFF: 160; 4.5 AEROSOL RESPIRATORY (INHALATION) at 06:26

## 2019-11-15 RX ADMIN — ACETAMINOPHEN 325 MG: 325 TABLET, FILM COATED ORAL at 10:38

## 2019-11-15 RX ADMIN — POTASSIUM CHLORIDE 20 MEQ: 20 TABLET, EXTENDED RELEASE ORAL at 17:44

## 2019-11-15 RX ADMIN — IPRATROPIUM BROMIDE AND ALBUTEROL SULFATE 3 ML: .5; 3 SOLUTION RESPIRATORY (INHALATION) at 14:10

## 2019-11-15 RX ADMIN — GUAIFENESIN 600 MG: 600 TABLET, EXTENDED RELEASE ORAL at 05:26

## 2019-11-15 RX ADMIN — BUDESONIDE AND FORMOTEROL FUMARATE DIHYDRATE 2 PUFF: 160; 4.5 AEROSOL RESPIRATORY (INHALATION) at 19:37

## 2019-11-15 RX ADMIN — METOPROLOL TARTRATE 12.5 MG: 25 TABLET ORAL at 21:15

## 2019-11-15 RX ADMIN — RIVAROXABAN 20 MG: 20 TABLET, FILM COATED ORAL at 17:44

## 2019-11-15 RX ADMIN — ACETAMINOPHEN 325 MG: 325 TABLET, FILM COATED ORAL at 17:44

## 2019-11-15 RX ADMIN — FUROSEMIDE 40 MG: 40 TABLET ORAL at 05:24

## 2019-11-15 RX ADMIN — IPRATROPIUM BROMIDE AND ALBUTEROL SULFATE 3 ML: .5; 3 SOLUTION RESPIRATORY (INHALATION) at 06:26

## 2019-11-15 RX ADMIN — OMEPRAZOLE 20 MG: 20 CAPSULE, DELAYED RELEASE ORAL at 05:28

## 2019-11-15 RX ADMIN — NYSTATIN: 100000 POWDER TOPICAL at 06:00

## 2019-11-15 RX ADMIN — ACETAMINOPHEN 325 MG: 325 TABLET, FILM COATED ORAL at 05:28

## 2019-11-15 RX ADMIN — LEVOTHYROXINE SODIUM 175 MCG: 125 TABLET ORAL at 05:24

## 2019-11-15 ASSESSMENT — ENCOUNTER SYMPTOMS
DIAPHORESIS: 0
NECK PAIN: 0
FLANK PAIN: 0
NAUSEA: 0
FEVER: 0
DIZZINESS: 0
ABDOMINAL PAIN: 0
NERVOUS/ANXIOUS: 0
WHEEZING: 0
BACK PAIN: 0
SORE THROAT: 0
SHORTNESS OF BREATH: 1
VOMITING: 0
CHILLS: 0
BLURRED VISION: 0
COUGH: 0
HEADACHES: 0
SENSORY CHANGE: 0
PALPITATIONS: 0
HEARTBURN: 0
SPEECH CHANGE: 0
WEAKNESS: 1
DIARRHEA: 0
FOCAL WEAKNESS: 0

## 2019-11-15 NOTE — DISCHARGE PLANNING
Agency/Facility Name: [facility]  Spoke To: Tracee  Outcome: Referral is currently under review. Per Tracee, she has to clear it with the MD before acceptance.

## 2019-11-15 NOTE — CARE PLAN
Problem: Nutritional:  Goal: Achieve adequate nutritional intake  Description  Patient will consume 50% of meals and supplements   Outcome: PROGRESSING SLOWER THAN EXPECTED    PO intake <50% most meals per pt, drinking Boost Plus TID. RD will continue to monitor.

## 2019-11-15 NOTE — PROGRESS NOTES
Offered to turn Pt and/or assist up to chair. Pt refused turn and chair. Offered Pt a bed bath, Pt refused. Offered to change Pt's linen after Pt spilled breakfast, Pt refused. I educated Pt on the risk of skin breakdown without mobilization, Pt states she understands and still refuses. Will continue to encourage. Purewick in place.

## 2019-11-15 NOTE — DISCHARGE PLANNING
Received Choice form at 1137  Agency/Facility Name: [facility], [facility], [facility],  [facility], [facility], [facility], [facility], [facility]  Referral sent per Choice form @ [phone]

## 2019-11-15 NOTE — CARE PLAN
Fall precautions in place. Bed in lowest position. Non-skid socks in place. Personal possessions within reach. Mobility sign on door. Bed-alarm on. Call light within reach. Pt educated regarding fall prevention and states understanding.      Pt educated regarding plan of care and medications. All questions answered.

## 2019-11-15 NOTE — PROGRESS NOTES
[facility] Medicine Daily Progress Note    Date of Service  11/15/2019    Chief Complaint  69 y.o. female admitted 10/28/2019 with cardiorespiratory arrest.    Hospital Course  69-year-old female with history of COPD on home oxygen, and hypothyroidism presented 10/28 with cardiac arrest, initial intubation went out in her home oxygen she was sent home with oxygen tank however she was found 4 hours later on consciousness outside of her house, the patient had significant hypoxia with hypercapnia, she was intubated she had a PEA arrest s subsequently obtained RosC and then transferred to [facility] for ICU management, on the admission-chest x-ray markedly prominent interstitial lung markings bilateral could be related to chronic interstitial lung disease or edema, her HCO2 was 38 and creatinine 0.8 and no leukocytosis, respiratory therapy for COPD with inhalers and Lasix have been started however developed V. tach arrest and was shocked.  She had improved and eventually extubated.  Developed paroxysmal atrial flutter, initially placed on amiodarone and later discontinued and started on metoprolol 12.5 twice daily and Xarelto was started.    Noted to have bilateral acute rib fractures, and metatarsal fractures on the right foot.      Interval Problem Update  Worse SOB>>ABG showed high Co2  Reviewed CXR no significant changes   Decrease O2 flow to keep sat>88%  Continue lasix and RT   Increase metoprolol to 25 BID       Consultants/Specialty  Critical care   Cardiology  Orthopedic surgery    Code Status  Full    Disposition  SNF    Review of Systems  Review of Systems   Constitutional: Positive for malaise/fatigue. Negative for chills, diaphoresis and fever.   HENT: Negative for hearing loss and sore throat.    Eyes: Negative for blurred vision.   Respiratory: Positive for shortness of breath. Negative for cough and wheezing.    Cardiovascular: Negative for chest pain, palpitations and leg swelling.        Improved     Gastrointestinal: Negative for abdominal pain, diarrhea, heartburn, nausea and vomiting.   Genitourinary: Negative for dysuria, flank pain and hematuria.   Musculoskeletal: Negative for back pain, joint pain and neck pain.   Skin: Negative for rash.   Neurological: Positive for weakness. Negative for dizziness, sensory change, speech change, focal weakness and headaches.   Psychiatric/Behavioral: The patient is not nervous/anxious.         Physical Exam  Temp:  [36.2 °C (97.1 °F)-36.6 °C (97.9 °F)] 36.6 °C (97.9 °F)  Pulse:  [] 98  Resp:  [16-20] 18  BP: ()/(68-78) 127/74  SpO2:  [87 %-95 %] 93 %    Physical Exam  Constitutional:       Appearance: She is obese.   HENT:      Head: Normocephalic and atraumatic.      Nose: No congestion.      Mouth/Throat:      Mouth: Mucous membranes are moist.   Eyes:      Conjunctiva/sclera: Conjunctivae normal.      Pupils: Pupils are equal, round, and reactive to light.   Neck:      Musculoskeletal: No muscular tenderness.   Cardiovascular:      Rate and Rhythm: Normal rate and regular rhythm.   Pulmonary:      Effort: Accessory muscle usage present. No respiratory distress.      Breath sounds: Rales present. No wheezing.   Abdominal:      General: Bowel sounds are normal. There is no distension.      Palpations: Abdomen is soft.      Tenderness: There is no tenderness. There is no guarding or rebound.   Musculoskeletal:      Right lower leg: Edema present.      Left lower leg: Edema present.   Lymphadenopathy:      Cervical: No cervical adenopathy.   Skin:     Findings: Bruising (Right foot) present.   Neurological:      General: No focal deficit present.      Mental Status: She is alert and oriented to person, place, and time.      Cranial Nerves: No cranial nerve deficit.         Fluids    Intake/Output Summary (Last 24 hours) at 11/15/2019 2054  Last data filed at 11/15/2019 1300  Gross per 24 hour   Intake --   Output 850 ml   Net -850 ml        Laboratory  Recent Labs     11/15/19  0252   WBC 7.0   RBC 4.70   HEMOGLOBIN 14.9   HEMATOCRIT 49.9*   .2*   MCH 31.7   MCHC 29.9*   RDW 52.4*   PLATELETCT 152*   MPV 11.3     Recent Labs     11/13/19  1247 11/15/19  0252   SODIUM 145 143   POTASSIUM 3.6 3.8   CHLORIDE 101 95*   CO2 38* 42*   GLUCOSE 157* 112*   BUN 33* 31*   CREATININE 0.95 0.90   CALCIUM 10.5 11.9*                   Imaging  DX-CHEST-PORTABLE (1 VIEW)   Final Result         1. No significant interval change.      MR-FOOT-WITH & W/O RIGHT   Final Result      1. A nonenhancing hyperintense T1, isointense T2 1.4 x 4.1 x 4.3 cm area deep to the subcutaneous tissue about the dorsum of the foot with overlying subcutaneous edema. This is nonspecific but could relate to hematoma. Superimposed infection cannot be    entirely excluded.      2. Mildly displaced fractures at the distal fourth and fifth metatarsals. Minimal marrow edema within no definite marrow replacement, suggesting subacute healing fractures. Osteomyelitis is considered less likely.      DX-FOOT-2- RIGHT   Final Result      Soft tissue swelling with subacute appearing fractures involving the necks of the fourth and fifth metatarsals with possible bone resorption which could be due to fractures or osteomyelitis.      DX-CHEST-PORTABLE (1 VIEW)   Final Result      1.  Progressive volume loss in the right lung, with progressive rightward mediastinal shift. Underlying pneumonia and small pleural effusion are possible in the proper clinical settings.   2.  Slight worsening of left basilar atelectasis versus consolidation. No significant left effusion.      DX-CHEST-PORTABLE (1 VIEW)   Final Result      1.  Possible improved consolidation in the right lung versus related to differences in rotation.   2.  Improved interstitial opacity/edema.   3.  Left basilar atelectasis.      DX-CHEST-PORTABLE (1 VIEW)   Final Result      1.  Removal of endotracheal tube and enteric catheters       2.  No other change      DX-CHEST-PORTABLE (1 VIEW)   Final Result      Improved left upper lobe atelectasis and consolidation      Otherwise stable right worse than left consolidation and atelectasis with probable right pleural fluid      Bilateral acute rib fractures      EC-ECHOCARDIOGRAM COMPLETE W/O CONT   Final Result      DX-ABDOMEN FOR TUBE PLACEMENT   Final Result      Feeding tube in place as noted above.      DX-CHEST-PORTABLE (1 VIEW)   Final Result      Improving right upper lobe atelectasis. No significant change otherwise.      DX-ABDOMEN FOR TUBE PLACEMENT   Final Result      Nasogastric tube in place as noted above.      CT-CTA CHEST PULMONARY ARTERY W/ RECONS   Final Result   Addendum 1 of 1   Addendum dictated on 11/2/2019 by Dr. Peng as follows:   Addendum:   In reviewing the exam there is an incidentally noted inferomedial left    breast mass 19 x 23 mm. I discussed these findings seconds ago with the    intensivist currently covering the patient.      Final      DX-CHEST-PORTABLE (1 VIEW)   Final Result      Diffuse bilateral interstitial opacities, suggesting pulmonary edema. Additionally there are confluent and linear opacities throughout the right perihilar and upper lung field and in the left base, consistent with a combination of pneumonia and    atelectasis. All of this is considerably worse compared with the prior image.           Assessment/Plan  * Acute on chronic respiratory failure with hypoxia and hypercapnia (HCC)- (present on admission)  Assessment & Plan  Intubated 10/28/2019, extubated 10/30  Echo normal ejection fraction 55% and no pulmonary hypertension  No kidney failure  Refused nocturnal BiPAP  RT protocol: And inhalers  Lasix 40 mg PO BID     Metatarsal bone fracture- (present on admission)  Assessment & Plan  postoperative shoe  Pain control    Chronic obstructive pulmonary disease with acute exacerbation (HCC)- (present on admission)  Assessment & Plan  Symbicort, RT  protocol  No signs of pulmonary hypertension on the echo  Continue Lasix and inhalers at this time    Encephalopathy- (present on admission)  Assessment & Plan  Delirium due to acute respiratory failure   Improving   Alert and oriented  avoid benzodiazepines and anticholinergics  Frequent orientation  Avoid early morning labs  Avoid vital signs during sleep  Ambulate if possible      Hypophosphatemia- (present on admission)  Assessment & Plan  Follow level    Pulmonary hypertension (HCC)- (present on admission)  Assessment & Plan  Lasix    Left breast mass- (present on admission)  Assessment & Plan  Will need biopsy - awaiting pt's decision      Closed fracture of multiple ribs of both sides- (present on admission)  Assessment & Plan  Acute and previously healed, pain control    Collapse of right lung- (present on admission)  Assessment & Plan  resolved    Paroxysmal atrial flutter (HCC)  Assessment & Plan  Developed RVR   ContinueXarelto  Increase metoprolol to 25 BID     Hypomagnesemia- (present on admission)  Assessment & Plan  IV Mg given  Recheck level    Ventricular tachycardia (HCC)- (present on admission)  Assessment & Plan  Amiodarone stopped on 11/6  Metoprolol 12.5 twice daily        Vitamin B12 deficiency- (present on admission)  Assessment & Plan  Resolved    Hypokalemia- (present on admission)  Assessment & Plan  Kdur  Follow BMP    Hypertension- (present on admission)  Assessment & Plan  Decreased metoprolol to 12.5 mg twice daily with parameters placed  Controlled continue monitoring    Other specified hypothyroidism- (present on admission)  Assessment & Plan  Synthroid increased to 175 mcg on 11/1/2019  Needs repeat TSH on 12/1/2019    Urinary retention  Assessment & Plan  Resolved    Thrombocytopenia (HCC)- (present on admission)  Assessment & Plan  Follow cbc    Morbid obesity (HCC)- (present on admission)  Assessment & Plan  Body mass index is 49.78 kg/m².       VTE prophylaxis: Xarelto    The  case was discussed with the nurse and multidisciplinary team.

## 2019-11-15 NOTE — PROGRESS NOTES
Received report from day shift RNMelissa. Assumed care of pt. Pt reports severe pain around L side rib cage at this time. Updated pt on plan of care. Pt resting comfortably in bed. Bed alarm in place. Educated on use of call light. Hourly rounding and continuous monitoring in place.

## 2019-11-15 NOTE — DISCHARGE PLANNING
Agency/Facility Name: [facility] ( [facility])  Spoke To: Admission  Outcome: Patient declined due to not being contracted with patient's insurance.

## 2019-11-15 NOTE — DISCHARGE PLANNING
Agency/Facility Name: [facility]  Spoke To: Admissions  Outcome: referral not received. I refaxed the referral    Agency/Facility Name: [facility]  Spoke To: Admissions  Outcome: referral not received. I refaxed the referral    Agency/Facility Name: [facility] ( [facility])  Spoke To: Estiven   Outcome: Patient declined as care exceeds capacity.    Agency/Facility Name: [facility]  Outcome: Left a voicemail in regards to patient's referral.     Agency/Facility Name: [facility]  Spoke To: Iveth  Outcome: Patient declined. Per Iveth, they are not contracted with [employer] and patient is out of Medicare Days.       Agency/Facility Name: [facility]  Spoke To: Alicia  Outcome: Per Alicia, they are still unable accept patients at this time due to  Neuro Virus.    Agency/Facility Name: [facility]  Left message for: BJ  Outcome: Left message in regards to patients referral.     Agency/Facility Name: [facility]  Spoke To: Antonette  Outcome: Per Antonette, the fax number has changed to [phone]. I refaxed the referral.

## 2019-11-15 NOTE — PROGRESS NOTES
Kiara from Lab called with critical result of PCO2 59 at 1200. Critical lab result read back to Dr. Redd.   Dr. Redd notified of critical lab result at 1200.  Critical lab result read back by Dr. Redd.

## 2019-11-15 NOTE — THERAPY
"Physical Therapy Treatment completed.   Bed Mobility:  Supine to Sit: Maximal Assist (with bed features)  Transfers: Sit to Stand: Refused  Gait: Level Of Assist: Refused       Plan of Care: Will benefit from Physical Therapy 3 times per week  Discharge Recommendations: Equipment: Will Continue to Assess for Equipment Needs. Post-acute therapy: see below.    See \"Rehab Therapy-Acute\" Patient Summary Report for complete documentation.     Patient continues to be limited by impaired initiation and behavior. She refused standing due to pain in R foot; significant edema and bruising observed on dorsal aspect of foot. She was agreeable to sitting EOB and requested to perform ADLs. She tolerated sitting approximately 20 minutes EOB and demonstrated heavy reliance on RUE for support in sitting. After session she reported \"that wasn't as bad as I thought\" but not receptive to education regarding importance of mobility. Will continue to follow while in house though currently with limited benefit given poor participation. Recommend post acute placement.  "

## 2019-11-15 NOTE — CARE PLAN
Problem: Oxygenation:  Goal: Maintain adequate oxygenation dependent on patient condition  Outcome: PROGRESSING AS EXPECTED  Note:   3lpm/nc baseline     Problem: Bronchopulmonary Hygiene:  Goal: Increase mobilization of retained secretions  Outcome: PROGRESSING AS EXPECTED  Note:   Flutter      Problem: Bronchoconstriction:  Goal: Improve in air movement and diminished wheezing  Outcome: PROGRESSING AS EXPECTED  Note:   Home regimen of DUO-QID

## 2019-11-15 NOTE — THERAPY
"Attempted to see pt for Occupational Therapy treatment today. Pt adamantly refused stating,\" I can't do any thing you suggested\"; \"I'm having tests today\"; \"I didn't sleep well and I am in great pain\".  Pt stated her pain 9-10: Informed RN that pt wants, \"one tylenol for pain\". RN stated that the only \"tests\" pt will have today \"is a chest x-ray and some blood work\". RN stated pt is refusing all care. Will attempt OT POC next scheduled OT session. RN informed and agreed.    "

## 2019-11-16 PROBLEM — E83.52 SERUM CALCIUM ELEVATED: Status: ACTIVE | Noted: 2019-11-16

## 2019-11-16 LAB
ALBUMIN SERPL BCP-MCNC: 3.4 G/DL (ref 3.2–4.9)
ALBUMIN/GLOB SERPL: 1.2 G/DL
ALP SERPL-CCNC: 233 U/L (ref 30–99)
ALT SERPL-CCNC: 12 U/L (ref 2–50)
ANION GAP SERPL CALC-SCNC: 4 MMOL/L (ref 0–11.9)
AST SERPL-CCNC: 11 U/L (ref 12–45)
BASE EXCESS BLDA CALC-SCNC: 10 MMOL/L (ref -4–3)
BASOPHILS # BLD AUTO: 0.9 % (ref 0–1.8)
BASOPHILS # BLD: 0.06 K/UL (ref 0–0.12)
BILIRUB SERPL-MCNC: 1.2 MG/DL (ref 0.1–1.5)
BODY TEMPERATURE: 36.5 CENTIGRADE
BUN SERPL-MCNC: 30 MG/DL (ref 8–22)
CALCIUM SERPL-MCNC: 11.6 MG/DL (ref 8.5–10.5)
CHLORIDE SERPL-SCNC: 98 MMOL/L (ref 96–112)
CO2 SERPL-SCNC: 38 MMOL/L (ref 20–33)
CREAT SERPL-MCNC: 0.78 MG/DL (ref 0.5–1.4)
EKG IMPRESSION: NORMAL
EOSINOPHIL # BLD AUTO: 0.06 K/UL (ref 0–0.51)
EOSINOPHIL NFR BLD: 0.9 % (ref 0–6.9)
ERYTHROCYTE [DISTWIDTH] IN BLOOD BY AUTOMATED COUNT: 51.8 FL (ref 35.9–50)
GLOBULIN SER CALC-MCNC: 2.9 G/DL (ref 1.9–3.5)
GLUCOSE SERPL-MCNC: 131 MG/DL (ref 65–99)
HCO3 BLDA-SCNC: 39 MMOL/L (ref 17–25)
HCT VFR BLD AUTO: 47.6 % (ref 37–47)
HGB BLD-MCNC: 14.2 G/DL (ref 12–16)
INHALED O2 FLOW RATE: 2.5 L/MIN (ref 2–10)
LACTATE BLD-SCNC: 1.1 MMOL/L (ref 0.5–2)
LYMPHOCYTES # BLD AUTO: 0.35 K/UL (ref 1–4.8)
LYMPHOCYTES NFR BLD: 5.4 % (ref 22–41)
MANUAL DIFF BLD: NORMAL
MCH RBC QN AUTO: 31.5 PG (ref 27–33)
MCHC RBC AUTO-ENTMCNC: 29.8 G/DL (ref 33.6–35)
MCV RBC AUTO: 105.5 FL (ref 81.4–97.8)
MONOCYTES # BLD AUTO: 0.52 K/UL (ref 0–0.85)
MONOCYTES NFR BLD AUTO: 8 % (ref 0–13.4)
MORPHOLOGY BLD-IMP: NORMAL
NEUTROPHILS # BLD AUTO: 5.51 K/UL (ref 2–7.15)
NEUTROPHILS NFR BLD: 84.8 % (ref 44–72)
NRBC # BLD AUTO: 0 K/UL
NRBC BLD-RTO: 0 /100 WBC
PCO2 BLDA: 70.2 MMHG (ref 26–37)
PCO2 TEMP ADJ BLDA: 68.7 MMHG (ref 26–37)
PH BLDA: 7.36 [PH] (ref 7.4–7.5)
PH TEMP ADJ BLDA: 7.37 [PH] (ref 7.4–7.5)
PLATELET # BLD AUTO: 129 K/UL (ref 164–446)
PLATELET BLD QL SMEAR: NORMAL
PMV BLD AUTO: 10.7 FL (ref 9–12.9)
PO2 BLDA: 58.5 MMHG (ref 64–87)
PO2 TEMP ADJ BLDA: 56.5 MMHG (ref 64–87)
POTASSIUM SERPL-SCNC: 4 MMOL/L (ref 3.6–5.5)
PROT SERPL-MCNC: 6.3 G/DL (ref 6–8.2)
RBC # BLD AUTO: 4.51 M/UL (ref 4.2–5.4)
RBC BLD AUTO: NORMAL
SAO2 % BLDA: 89.7 % (ref 93–99)
SODIUM SERPL-SCNC: 140 MMOL/L (ref 135–145)
WBC # BLD AUTO: 6.5 K/UL (ref 4.8–10.8)

## 2019-11-16 PROCEDURE — 99232 SBSQ HOSP IP/OBS MODERATE 35: CPT | Performed by: INTERNAL MEDICINE

## 2019-11-16 PROCEDURE — A9270 NON-COVERED ITEM OR SERVICE: HCPCS | Performed by: HOSPITALIST

## 2019-11-16 PROCEDURE — 85027 COMPLETE CBC AUTOMATED: CPT

## 2019-11-16 PROCEDURE — 80053 COMPREHEN METABOLIC PANEL: CPT

## 2019-11-16 PROCEDURE — 700101 HCHG RX REV CODE 250: Performed by: INTERNAL MEDICINE

## 2019-11-16 PROCEDURE — 36415 COLL VENOUS BLD VENIPUNCTURE: CPT

## 2019-11-16 PROCEDURE — 770020 HCHG ROOM/CARE - TELE (206)

## 2019-11-16 PROCEDURE — 700101 HCHG RX REV CODE 250: Performed by: HOSPITALIST

## 2019-11-16 PROCEDURE — 700102 HCHG RX REV CODE 250 W/ 637 OVERRIDE(OP): Performed by: INTERNAL MEDICINE

## 2019-11-16 PROCEDURE — A9270 NON-COVERED ITEM OR SERVICE: HCPCS | Performed by: INTERNAL MEDICINE

## 2019-11-16 PROCEDURE — 85007 BL SMEAR W/DIFF WBC COUNT: CPT

## 2019-11-16 PROCEDURE — 94668 MNPJ CHEST WALL SBSQ: CPT

## 2019-11-16 PROCEDURE — 94640 AIRWAY INHALATION TREATMENT: CPT

## 2019-11-16 PROCEDURE — 93010 ELECTROCARDIOGRAM REPORT: CPT | Performed by: INTERNAL MEDICINE

## 2019-11-16 PROCEDURE — 700102 HCHG RX REV CODE 250 W/ 637 OVERRIDE(OP)

## 2019-11-16 PROCEDURE — 94760 N-INVAS EAR/PLS OXIMETRY 1: CPT

## 2019-11-16 PROCEDURE — 82803 BLOOD GASES ANY COMBINATION: CPT

## 2019-11-16 PROCEDURE — A9270 NON-COVERED ITEM OR SERVICE: HCPCS | Performed by: FAMILY MEDICINE

## 2019-11-16 PROCEDURE — 83605 ASSAY OF LACTIC ACID: CPT

## 2019-11-16 PROCEDURE — A9270 NON-COVERED ITEM OR SERVICE: HCPCS

## 2019-11-16 PROCEDURE — 700102 HCHG RX REV CODE 250 W/ 637 OVERRIDE(OP): Performed by: HOSPITALIST

## 2019-11-16 PROCEDURE — 700102 HCHG RX REV CODE 250 W/ 637 OVERRIDE(OP): Performed by: FAMILY MEDICINE

## 2019-11-16 PROCEDURE — 93005 ELECTROCARDIOGRAM TRACING: CPT | Performed by: INTERNAL MEDICINE

## 2019-11-16 RX ORDER — FUROSEMIDE 40 MG/1
40 TABLET ORAL
Status: DISCONTINUED | OUTPATIENT
Start: 2019-11-16 | End: 2019-11-21 | Stop reason: HOSPADM

## 2019-11-16 RX ORDER — TIOTROPIUM BROMIDE 18 UG/1
1 CAPSULE ORAL; RESPIRATORY (INHALATION)
Status: DISCONTINUED | OUTPATIENT
Start: 2019-11-16 | End: 2019-11-21 | Stop reason: HOSPADM

## 2019-11-16 RX ADMIN — NYSTATIN: 100000 POWDER TOPICAL at 17:12

## 2019-11-16 RX ADMIN — NYSTATIN: 100000 POWDER TOPICAL at 05:21

## 2019-11-16 RX ADMIN — METOPROLOL TARTRATE 25 MG: 25 TABLET, FILM COATED ORAL at 05:20

## 2019-11-16 RX ADMIN — ACETAMINOPHEN 325 MG: 325 TABLET, FILM COATED ORAL at 05:29

## 2019-11-16 RX ADMIN — RIVAROXABAN 20 MG: 20 TABLET, FILM COATED ORAL at 17:13

## 2019-11-16 RX ADMIN — POTASSIUM CHLORIDE 20 MEQ: 20 TABLET, EXTENDED RELEASE ORAL at 05:20

## 2019-11-16 RX ADMIN — IPRATROPIUM BROMIDE AND ALBUTEROL SULFATE 3 ML: .5; 3 SOLUTION RESPIRATORY (INHALATION) at 06:41

## 2019-11-16 RX ADMIN — LEVOTHYROXINE SODIUM 175 MCG: 125 TABLET ORAL at 05:20

## 2019-11-16 RX ADMIN — GUAIFENESIN 600 MG: 600 TABLET, EXTENDED RELEASE ORAL at 17:13

## 2019-11-16 RX ADMIN — BUDESONIDE AND FORMOTEROL FUMARATE DIHYDRATE 2 PUFF: 160; 4.5 AEROSOL RESPIRATORY (INHALATION) at 19:14

## 2019-11-16 RX ADMIN — POTASSIUM CHLORIDE 20 MEQ: 20 TABLET, EXTENDED RELEASE ORAL at 17:13

## 2019-11-16 RX ADMIN — BUDESONIDE AND FORMOTEROL FUMARATE DIHYDRATE 2 PUFF: 160; 4.5 AEROSOL RESPIRATORY (INHALATION) at 05:20

## 2019-11-16 RX ADMIN — LIDOCAINE 1 PATCH: 50 PATCH TOPICAL at 17:12

## 2019-11-16 RX ADMIN — IPRATROPIUM BROMIDE AND ALBUTEROL SULFATE 3 ML: .5; 3 SOLUTION RESPIRATORY (INHALATION) at 14:44

## 2019-11-16 RX ADMIN — FUROSEMIDE 20 MG: 20 TABLET ORAL at 05:21

## 2019-11-16 RX ADMIN — IPRATROPIUM BROMIDE AND ALBUTEROL SULFATE 3 ML: .5; 3 SOLUTION RESPIRATORY (INHALATION) at 10:56

## 2019-11-16 RX ADMIN — GUAIFENESIN 600 MG: 600 TABLET, EXTENDED RELEASE ORAL at 05:20

## 2019-11-16 RX ADMIN — OMEPRAZOLE 20 MG: 20 CAPSULE, DELAYED RELEASE ORAL at 05:20

## 2019-11-16 RX ADMIN — FUROSEMIDE 40 MG: 40 TABLET ORAL at 17:13

## 2019-11-16 RX ADMIN — IPRATROPIUM BROMIDE AND ALBUTEROL SULFATE 3 ML: .5; 3 SOLUTION RESPIRATORY (INHALATION) at 19:14

## 2019-11-16 ASSESSMENT — ENCOUNTER SYMPTOMS
WHEEZING: 0
FEVER: 0
HEADACHES: 0
CHILLS: 0
BACK PAIN: 0
DIARRHEA: 0
WEAKNESS: 1
DIAPHORESIS: 0
SHORTNESS OF BREATH: 1
BLURRED VISION: 0
DIZZINESS: 0
FLANK PAIN: 0
NECK PAIN: 0
HEARTBURN: 0
COUGH: 0
SORE THROAT: 0
FOCAL WEAKNESS: 0
SPEECH CHANGE: 0
NERVOUS/ANXIOUS: 0
ABDOMINAL PAIN: 0
VOMITING: 0
NAUSEA: 0
PALPITATIONS: 0
SENSORY CHANGE: 0

## 2019-11-16 ASSESSMENT — PATIENT HEALTH QUESTIONNAIRE - PHQ9
SUM OF ALL RESPONSES TO PHQ9 QUESTIONS 1 AND 2: 0
2. FEELING DOWN, DEPRESSED, IRRITABLE, OR HOPELESS: NOT AT ALL
1. LITTLE INTEREST OR PLEASURE IN DOING THINGS: NOT AT ALL

## 2019-11-16 NOTE — CARE PLAN
Problem: Bowel/Gastric:  Goal: Will not experience complications related to bowel motility  Outcome: PROGRESSING AS EXPECTED     Problem: Knowledge Deficit  Goal: Knowledge of disease process/condition, treatment plan, diagnostic tests, and medications will improve  Outcome: PROGRESSING AS EXPECTED     Problem: Mobility  Goal: Risk for activity intolerance will decrease  Outcome: PROGRESSING AS EXPECTED     Problem: Urinary Elimination:  Goal: Ability to reestablish a normal urinary elimination pattern will improve  Outcome: PROGRESSING AS EXPECTED

## 2019-11-16 NOTE — PROGRESS NOTES
Stephen from Lab called with critical result of PCO2 68.7 at 0958. Critical lab result read back to Stephen.   Dr. Redd notified of critical lab result at 0959.  Critical lab result read back by Dr. Redd.

## 2019-11-16 NOTE — CARE PLAN
Problem: Oxygenation:  Goal: Maintain adequate oxygenation dependent on patient condition  Outcome: PROGRESSING AS EXPECTED  Note:   Pt switches back and forth from oxymask to nasal cannula; 3lpm     Problem: Bronchopulmonary Hygiene:  Goal: Increase mobilization of retained secretions  Outcome: PROGRESSING AS EXPECTED  Note:   Flutter in line w/ tx     Problem: Bronchoconstriction:  Goal: Improve in air movement and diminished wheezing  Outcome: PROGRESSING AS EXPECTED  Note:   Home regimen of DUO QID

## 2019-11-16 NOTE — PROGRESS NOTES
Patient cardiac monitor showing A-fib/A-flutter. Rate 114. EKG done per protocol.  At bedside. New lab orders. Patient states feel normal, not symptomatic at this time. See MAR for new medication orders.  recommends PRN lopressor at this time even at rate of 114.

## 2019-11-16 NOTE — PROGRESS NOTES
Assumed care at 0700, bedside report received from NOC shift RN. Pt. Is aflutter on the monitor. Initial assessment completed, orders reviewed, call light within reach, bed alarm is in use, and hourly rounding in place. POC addressed with patient, no additional questions at this time.

## 2019-11-17 LAB
ANION GAP SERPL CALC-SCNC: 6 MMOL/L (ref 0–11.9)
BUN SERPL-MCNC: 30 MG/DL (ref 8–22)
CALCIUM SERPL-MCNC: 11.1 MG/DL (ref 8.5–10.5)
CHLORIDE SERPL-SCNC: 98 MMOL/L (ref 96–112)
CO2 SERPL-SCNC: 39 MMOL/L (ref 20–33)
CREAT SERPL-MCNC: 0.83 MG/DL (ref 0.5–1.4)
EKG IMPRESSION: NORMAL
GLUCOSE SERPL-MCNC: 95 MG/DL (ref 65–99)
MAGNESIUM SERPL-MCNC: 1.9 MG/DL (ref 1.5–2.5)
PHOSPHATE SERPL-MCNC: 2.3 MG/DL (ref 2.5–4.5)
POTASSIUM SERPL-SCNC: 4 MMOL/L (ref 3.6–5.5)
PTH-INTACT SERPL-MCNC: 944.4 PG/ML (ref 14–72)
SODIUM SERPL-SCNC: 143 MMOL/L (ref 135–145)

## 2019-11-17 PROCEDURE — 700102 HCHG RX REV CODE 250 W/ 637 OVERRIDE(OP): Performed by: FAMILY MEDICINE

## 2019-11-17 PROCEDURE — A9270 NON-COVERED ITEM OR SERVICE: HCPCS | Performed by: INTERNAL MEDICINE

## 2019-11-17 PROCEDURE — 84100 ASSAY OF PHOSPHORUS: CPT

## 2019-11-17 PROCEDURE — 94640 AIRWAY INHALATION TREATMENT: CPT

## 2019-11-17 PROCEDURE — 700101 HCHG RX REV CODE 250: Performed by: INTERNAL MEDICINE

## 2019-11-17 PROCEDURE — 94668 MNPJ CHEST WALL SBSQ: CPT

## 2019-11-17 PROCEDURE — 700102 HCHG RX REV CODE 250 W/ 637 OVERRIDE(OP): Performed by: INTERNAL MEDICINE

## 2019-11-17 PROCEDURE — A9270 NON-COVERED ITEM OR SERVICE: HCPCS | Performed by: FAMILY MEDICINE

## 2019-11-17 PROCEDURE — 83735 ASSAY OF MAGNESIUM: CPT

## 2019-11-17 PROCEDURE — 770020 HCHG ROOM/CARE - TELE (206)

## 2019-11-17 PROCEDURE — 36415 COLL VENOUS BLD VENIPUNCTURE: CPT

## 2019-11-17 PROCEDURE — A9270 NON-COVERED ITEM OR SERVICE: HCPCS | Performed by: HOSPITALIST

## 2019-11-17 PROCEDURE — 700101 HCHG RX REV CODE 250: Performed by: HOSPITALIST

## 2019-11-17 PROCEDURE — 80048 BASIC METABOLIC PNL TOTAL CA: CPT

## 2019-11-17 PROCEDURE — 94760 N-INVAS EAR/PLS OXIMETRY 1: CPT

## 2019-11-17 PROCEDURE — 99232 SBSQ HOSP IP/OBS MODERATE 35: CPT | Performed by: INTERNAL MEDICINE

## 2019-11-17 PROCEDURE — 94669 MECHANICAL CHEST WALL OSCILL: CPT

## 2019-11-17 PROCEDURE — 93010 ELECTROCARDIOGRAM REPORT: CPT | Performed by: INTERNAL MEDICINE

## 2019-11-17 PROCEDURE — 83970 ASSAY OF PARATHORMONE: CPT

## 2019-11-17 PROCEDURE — 700102 HCHG RX REV CODE 250 W/ 637 OVERRIDE(OP): Performed by: HOSPITALIST

## 2019-11-17 RX ADMIN — LIDOCAINE 1 PATCH: 50 PATCH TOPICAL at 15:51

## 2019-11-17 RX ADMIN — IPRATROPIUM BROMIDE AND ALBUTEROL SULFATE 3 ML: .5; 3 SOLUTION RESPIRATORY (INHALATION) at 22:58

## 2019-11-17 RX ADMIN — NYSTATIN: 100000 POWDER TOPICAL at 05:11

## 2019-11-17 RX ADMIN — RIVAROXABAN 20 MG: 20 TABLET, FILM COATED ORAL at 18:08

## 2019-11-17 RX ADMIN — GUAIFENESIN 600 MG: 600 TABLET, EXTENDED RELEASE ORAL at 18:08

## 2019-11-17 RX ADMIN — IPRATROPIUM BROMIDE AND ALBUTEROL SULFATE 3 ML: .5; 3 SOLUTION RESPIRATORY (INHALATION) at 08:16

## 2019-11-17 RX ADMIN — IPRATROPIUM BROMIDE AND ALBUTEROL SULFATE 3 ML: .5; 3 SOLUTION RESPIRATORY (INHALATION) at 17:50

## 2019-11-17 RX ADMIN — BUDESONIDE AND FORMOTEROL FUMARATE DIHYDRATE 2 PUFF: 160; 4.5 AEROSOL RESPIRATORY (INHALATION) at 17:50

## 2019-11-17 RX ADMIN — LEVOTHYROXINE SODIUM 175 MCG: 125 TABLET ORAL at 05:04

## 2019-11-17 RX ADMIN — ACETAMINOPHEN 325 MG: 325 TABLET, FILM COATED ORAL at 05:03

## 2019-11-17 RX ADMIN — TIOTROPIUM BROMIDE 1 CAPSULE: 18 CAPSULE ORAL; RESPIRATORY (INHALATION) at 08:17

## 2019-11-17 RX ADMIN — IPRATROPIUM BROMIDE AND ALBUTEROL SULFATE 3 ML: .5; 3 SOLUTION RESPIRATORY (INHALATION) at 15:16

## 2019-11-17 RX ADMIN — GUAIFENESIN 600 MG: 600 TABLET, EXTENDED RELEASE ORAL at 05:04

## 2019-11-17 RX ADMIN — BUDESONIDE AND FORMOTEROL FUMARATE DIHYDRATE 2 PUFF: 160; 4.5 AEROSOL RESPIRATORY (INHALATION) at 08:17

## 2019-11-17 RX ADMIN — IPRATROPIUM BROMIDE AND ALBUTEROL SULFATE 3 ML: .5; 3 SOLUTION RESPIRATORY (INHALATION) at 11:10

## 2019-11-17 RX ADMIN — OMEPRAZOLE 20 MG: 20 CAPSULE, DELAYED RELEASE ORAL at 05:04

## 2019-11-17 ASSESSMENT — ENCOUNTER SYMPTOMS
CHILLS: 0
COUGH: 0
BLURRED VISION: 0
FEVER: 0
VOMITING: 0
SPEECH CHANGE: 0
BACK PAIN: 0
HEADACHES: 0
FLANK PAIN: 0
HEARTBURN: 0
NERVOUS/ANXIOUS: 0
NAUSEA: 0
DIAPHORESIS: 0
SORE THROAT: 0
SHORTNESS OF BREATH: 1
SENSORY CHANGE: 0
WHEEZING: 0
DIARRHEA: 0
PALPITATIONS: 0
ABDOMINAL PAIN: 0
WEAKNESS: 1
DIZZINESS: 0
NECK PAIN: 0
FOCAL WEAKNESS: 0

## 2019-11-17 NOTE — PROGRESS NOTES
[facility] Medicine Daily Progress Note    Date of Service  11/16/2019    Chief Complaint  69 y.o. female admitted 10/28/2019 with cardiorespiratory arrest.    Hospital Course  69-year-old female with history of COPD on home oxygen, and hypothyroidism presented 10/28 with cardiac arrest, initial intubation went out in her home oxygen she was sent home with oxygen tank however she was found 4 hours later on consciousness outside of her house, the patient had significant hypoxia with hypercapnia, she was intubated she had a PEA arrest s subsequently obtained RosC and then transferred to [facility] for ICU management, on the admission-chest x-ray markedly prominent interstitial lung markings bilateral could be related to chronic interstitial lung disease or edema, her HCO2 was 38 and creatinine 0.8 and no leukocytosis, respiratory therapy for COPD with inhalers and Lasix have been started however developed V. tach arrest and was shocked.  She had improved and eventually extubated.  Developed paroxysmal atrial flutter, initially placed on amiodarone and later discontinued and started on metoprolol 12.5 twice daily and Xarelto was started.    Noted to have bilateral acute rib fractures, and metatarsal fractures on the right foot.      Interval Problem Update  The patient feels her breathing on her baseline, on baseline oxygen supplementation  Labs showed some worsening on he chronic respiratory acidosis  Watch her closely and consider ICU consult for BiPAP if needed  Continue Lasix for crackles      Consultants/Specialty  Critical care   Cardiology  Orthopedic surgery    Code Status  Full    Disposition  SNF    Review of Systems  Review of Systems   Constitutional: Positive for malaise/fatigue. Negative for chills, diaphoresis and fever.   HENT: Negative for hearing loss and sore throat.    Eyes: Negative for blurred vision.   Respiratory: Positive for shortness of breath. Negative for cough and wheezing.    Cardiovascular:  Negative for chest pain, palpitations and leg swelling.        Improved    Gastrointestinal: Negative for abdominal pain, diarrhea, heartburn, nausea and vomiting.   Genitourinary: Negative for dysuria, flank pain and hematuria.   Musculoskeletal: Negative for back pain, joint pain and neck pain.   Skin: Negative for rash.   Neurological: Positive for weakness. Negative for dizziness, sensory change, speech change, focal weakness and headaches.   Psychiatric/Behavioral: The patient is not nervous/anxious.         Physical Exam  Temp:  [36.6 °C (97.8 °F)-36.8 °C (98.2 °F)] 36.8 °C (98.2 °F)  Pulse:  [] 95  Resp:  [16-18] 16  BP: ()/(60-76) 111/71  SpO2:  [88 %-94 %] 94 %    Physical Exam  Constitutional:       Appearance: She is obese.   HENT:      Head: Normocephalic and atraumatic.      Nose: No congestion.      Mouth/Throat:      Mouth: Mucous membranes are moist.   Eyes:      Conjunctiva/sclera: Conjunctivae normal.      Pupils: Pupils are equal, round, and reactive to light.   Neck:      Musculoskeletal: No muscular tenderness.   Cardiovascular:      Rate and Rhythm: Normal rate and regular rhythm.   Pulmonary:      Effort: Accessory muscle usage present. No respiratory distress.      Breath sounds: Rales present. No wheezing.   Abdominal:      General: Bowel sounds are normal. There is no distension.      Palpations: Abdomen is soft.      Tenderness: There is no tenderness. There is no guarding or rebound.   Musculoskeletal:      Right lower leg: Edema present.      Left lower leg: Edema present.   Lymphadenopathy:      Cervical: No cervical adenopathy.   Skin:     Findings: Bruising (Right foot) present.   Neurological:      General: No focal deficit present.      Mental Status: She is alert and oriented to person, place, and time.      Cranial Nerves: No cranial nerve deficit.         Fluids    Intake/Output Summary (Last 24 hours) at 11/16/2019 6081  Last data filed at 11/16/2019 0000  Gross per  24 hour   Intake 200 ml   Output --   Net 200 ml       Laboratory  Recent Labs     11/15/19  0252 11/16/19  0653   WBC 7.0 6.5   RBC 4.70 4.51   HEMOGLOBIN 14.9 14.2   HEMATOCRIT 49.9* 47.6*   .2* 105.5*   MCH 31.7 31.5   MCHC 29.9* 29.8*   RDW 52.4* 51.8*   PLATELETCT 152* 129*   MPV 11.3 10.7     Recent Labs     11/15/19  0252 11/16/19  0608   SODIUM 143 140   POTASSIUM 3.8 4.0   CHLORIDE 95* 98   CO2 42* 38*   GLUCOSE 112* 131*   BUN 31* 30*   CREATININE 0.90 0.78   CALCIUM 11.9* 11.6*                   Imaging  DX-CHEST-PORTABLE (1 VIEW)   Final Result         1. No significant interval change.      MR-FOOT-WITH & W/O RIGHT   Final Result      1. A nonenhancing hyperintense T1, isointense T2 1.4 x 4.1 x 4.3 cm area deep to the subcutaneous tissue about the dorsum of the foot with overlying subcutaneous edema. This is nonspecific but could relate to hematoma. Superimposed infection cannot be    entirely excluded.      2. Mildly displaced fractures at the distal fourth and fifth metatarsals. Minimal marrow edema within no definite marrow replacement, suggesting subacute healing fractures. Osteomyelitis is considered less likely.      DX-FOOT-2- RIGHT   Final Result      Soft tissue swelling with subacute appearing fractures involving the necks of the fourth and fifth metatarsals with possible bone resorption which could be due to fractures or osteomyelitis.      DX-CHEST-PORTABLE (1 VIEW)   Final Result      1.  Progressive volume loss in the right lung, with progressive rightward mediastinal shift. Underlying pneumonia and small pleural effusion are possible in the proper clinical settings.   2.  Slight worsening of left basilar atelectasis versus consolidation. No significant left effusion.      DX-CHEST-PORTABLE (1 VIEW)   Final Result      1.  Possible improved consolidation in the right lung versus related to differences in rotation.   2.  Improved interstitial opacity/edema.   3.  Left basilar  atelectasis.      DX-CHEST-PORTABLE (1 VIEW)   Final Result      1.  Removal of endotracheal tube and enteric catheters      2.  No other change      DX-CHEST-PORTABLE (1 VIEW)   Final Result      Improved left upper lobe atelectasis and consolidation      Otherwise stable right worse than left consolidation and atelectasis with probable right pleural fluid      Bilateral acute rib fractures      EC-ECHOCARDIOGRAM COMPLETE W/O CONT   Final Result      DX-ABDOMEN FOR TUBE PLACEMENT   Final Result      Feeding tube in place as noted above.      DX-CHEST-PORTABLE (1 VIEW)   Final Result      Improving right upper lobe atelectasis. No significant change otherwise.      DX-ABDOMEN FOR TUBE PLACEMENT   Final Result      Nasogastric tube in place as noted above.      CT-CTA CHEST PULMONARY ARTERY W/ RECONS   Final Result   Addendum 1 of 1   Addendum dictated on 11/2/2019 by Dr. Peng as follows:   Addendum:   In reviewing the exam there is an incidentally noted inferomedial left    breast mass 19 x 23 mm. I discussed these findings seconds ago with the    intensivist currently covering the patient.      Final      DX-CHEST-PORTABLE (1 VIEW)   Final Result      Diffuse bilateral interstitial opacities, suggesting pulmonary edema. Additionally there are confluent and linear opacities throughout the right perihilar and upper lung field and in the left base, consistent with a combination of pneumonia and    atelectasis. All of this is considerably worse compared with the prior image.           Assessment/Plan  * Acute on chronic respiratory failure with hypoxia and hypercapnia (HCC)- (present on admission)  Assessment & Plan  Intubated 10/28/2019, extubated 10/30  Echo normal ejection fraction 55% and no pulmonary hypertension  No kidney failure  To her closely and consider BiPAP if needed  RT protocol: And inhalers  Lasix 40 mg PO BID     Metatarsal bone fracture- (present on admission)  Assessment & Plan  postoperative  shoe  Pain control    Chronic obstructive pulmonary disease with acute exacerbation (HCC)- (present on admission)  Assessment & Plan  Symbicort, RT protocol  No signs of pulmonary hypertension on the echo  Continue Lasix and inhalers at this time    Encephalopathy- (present on admission)  Assessment & Plan  Delirium due to acute respiratory failure   Improving   Alert and oriented x3  avoid benzodiazepines and anticholinergics  Frequent orientation  Avoid early morning labs  Avoid vital signs during sleep  Ambulate if possible      Hypophosphatemia- (present on admission)  Assessment & Plan  Follow level    Pulmonary hypertension (HCC)- (present on admission)  Assessment & Plan  Lasix    Left breast mass- (present on admission)  Assessment & Plan  Will need biopsy - awaiting pt's decision      Closed fracture of multiple ribs of both sides- (present on admission)  Assessment & Plan  Acute and previously healed, pain control    Collapse of right lung- (present on admission)  Assessment & Plan  resolved    Paroxysmal atrial flutter (HCC)  Assessment & Plan  Developed RVR   ContinueXarelto  Increase metoprolol to 25 BID     Hypomagnesemia- (present on admission)  Assessment & Plan  IV Mg given  Recheck level    Ventricular tachycardia (HCC)- (present on admission)  Assessment & Plan  Amiodarone stopped on 11/6  Metoprolol 12.5 twice daily        Vitamin B12 deficiency- (present on admission)  Assessment & Plan  Resolved    Hypokalemia- (present on admission)  Assessment & Plan  Kdur  Follow BMP    Hypertension- (present on admission)  Assessment & Plan  Decreased metoprolol to 12.5 mg twice daily with parameters placed  Controlled continue monitoring    Other specified hypothyroidism- (present on admission)  Assessment & Plan  Synthroid increased to 175 mcg on 11/1/2019  Needs repeat TSH on 12/1/2019    Urinary retention  Assessment & Plan  Resolved    Thrombocytopenia (HCC)- (present on admission)  Assessment &  Plan  Follow cbc    Morbid obesity (HCC)- (present on admission)  Assessment & Plan  Body mass index is 49.78 kg/m².       VTE prophylaxis: Xarelto      I certify that the patient requires continue with medically necessary hospital services for the treatment of COPD exacerbation and acute respiratory failure and will remain in the hospital for couple days.  Discharge plan is anticipated to be on Monday if she is accepted by skilled nursing

## 2019-11-17 NOTE — FLOWSHEET NOTE
11/17/19 1516   SVN Group   #SVN Performed Yes   Given By: Mouthpiece   PEP/CPT Group   PEP/CPT/Airway Clearance Therapy Yes   #PEP/CPT (Manual) Subsequent Subsequent   PEP/CPT Method Flutter Valve   Chest Exam   Work Of Breathing / Effort Mild   Respiration 16   Pulse (!) 106   Breath Sounds   Pre/Post Intervention Pre Intervention Assessment   RUL Breath Sounds Diminished   RML Breath Sounds Diminished   RLL Breath Sounds Diminished   MARIO Breath Sounds Diminished   LLL Breath Sounds Diminished   Secretions   Cough Non Productive;Moist   How Sputum Obtained Cough on Request   Sputum Amount Unable to Evaluate   Sputum Color Unable to Evaluate   Sputum Consistency Unable to Evaluate   Oximetry   Continuous Oximetry Yes   Oxygen   Pulse Oximetry 91 %   O2 (LPM) 3   O2 Daily Delivery Respiratory  OxyMask

## 2019-11-17 NOTE — PROGRESS NOTES
Monitor Summary:    SR-ST    R PVC w/ first degree HB  Converted to A flut at 2311  Converted back to SR at 1 am.  .20/.06/.30

## 2019-11-17 NOTE — PROGRESS NOTES
Bedside report received from day shift RN. Assumed care. Pt is A&O x 4, Pt is in SR. Pt denies pain at this time. Pt was updated on plan of care. Pt has call light, personal belongings, and bedside table within reach. Bed is in the lowest position and bed alarm is on. Will continue to monitor.

## 2019-11-17 NOTE — ASSESSMENT & PLAN NOTE
High PTH and phosphorus is low  Calcium around 11  Most likely primary hyperparathyroidism  Discussed this finding with the patient she refused surgery or work-up.  She returns for follow-up

## 2019-11-17 NOTE — CARE PLAN
Problem: Safety  Goal: Will remain free from falls  Outcome: PROGRESSING AS EXPECTED     Problem: Knowledge Deficit  Goal: Knowledge of disease process/condition, treatment plan, diagnostic tests, and medications will improve  Outcome: PROGRESSING AS EXPECTED  Note:   Discussed POC and medications with patient, pt verbalized understanding.

## 2019-11-18 LAB
ANION GAP SERPL CALC-SCNC: 5 MMOL/L (ref 0–11.9)
BUN SERPL-MCNC: 31 MG/DL (ref 8–22)
CALCIUM SERPL-MCNC: 11.6 MG/DL (ref 8.5–10.5)
CHLORIDE SERPL-SCNC: 98 MMOL/L (ref 96–112)
CO2 SERPL-SCNC: 40 MMOL/L (ref 20–33)
CREAT SERPL-MCNC: 0.88 MG/DL (ref 0.5–1.4)
GLUCOSE SERPL-MCNC: 116 MG/DL (ref 65–99)
POTASSIUM SERPL-SCNC: 3.9 MMOL/L (ref 3.6–5.5)
SODIUM SERPL-SCNC: 143 MMOL/L (ref 135–145)

## 2019-11-18 PROCEDURE — A9270 NON-COVERED ITEM OR SERVICE: HCPCS | Performed by: FAMILY MEDICINE

## 2019-11-18 PROCEDURE — A9270 NON-COVERED ITEM OR SERVICE: HCPCS | Performed by: HOSPITALIST

## 2019-11-18 PROCEDURE — 700102 HCHG RX REV CODE 250 W/ 637 OVERRIDE(OP): Performed by: INTERNAL MEDICINE

## 2019-11-18 PROCEDURE — 36415 COLL VENOUS BLD VENIPUNCTURE: CPT

## 2019-11-18 PROCEDURE — 99231 SBSQ HOSP IP/OBS SF/LOW 25: CPT | Performed by: INTERNAL MEDICINE

## 2019-11-18 PROCEDURE — A9270 NON-COVERED ITEM OR SERVICE: HCPCS | Performed by: INTERNAL MEDICINE

## 2019-11-18 PROCEDURE — 700102 HCHG RX REV CODE 250 W/ 637 OVERRIDE(OP): Performed by: FAMILY MEDICINE

## 2019-11-18 PROCEDURE — 700102 HCHG RX REV CODE 250 W/ 637 OVERRIDE(OP): Performed by: HOSPITALIST

## 2019-11-18 PROCEDURE — 700101 HCHG RX REV CODE 250: Performed by: INTERNAL MEDICINE

## 2019-11-18 PROCEDURE — 80048 BASIC METABOLIC PNL TOTAL CA: CPT

## 2019-11-18 PROCEDURE — 94668 MNPJ CHEST WALL SBSQ: CPT

## 2019-11-18 PROCEDURE — 94640 AIRWAY INHALATION TREATMENT: CPT

## 2019-11-18 PROCEDURE — 770020 HCHG ROOM/CARE - TELE (206)

## 2019-11-18 RX ORDER — HYDROCODONE BITARTRATE AND ACETAMINOPHEN 10; 325 MG/1; MG/1
1 TABLET ORAL ONCE
Status: COMPLETED | OUTPATIENT
Start: 2019-11-18 | End: 2019-11-18

## 2019-11-18 RX ADMIN — RIVAROXABAN 20 MG: 20 TABLET, FILM COATED ORAL at 16:32

## 2019-11-18 RX ADMIN — ACETAMINOPHEN 325 MG: 325 TABLET, FILM COATED ORAL at 23:56

## 2019-11-18 RX ADMIN — GUAIFENESIN 600 MG: 600 TABLET, EXTENDED RELEASE ORAL at 04:36

## 2019-11-18 RX ADMIN — IPRATROPIUM BROMIDE AND ALBUTEROL SULFATE 3 ML: .5; 3 SOLUTION RESPIRATORY (INHALATION) at 06:33

## 2019-11-18 RX ADMIN — HYDROCODONE BITARTRATE AND ACETAMINOPHEN 1 TABLET: 10; 325 TABLET ORAL at 01:50

## 2019-11-18 RX ADMIN — METOPROLOL TARTRATE 25 MG: 25 TABLET, FILM COATED ORAL at 04:36

## 2019-11-18 RX ADMIN — IPRATROPIUM BROMIDE AND ALBUTEROL SULFATE 3 ML: .5; 3 SOLUTION RESPIRATORY (INHALATION) at 10:43

## 2019-11-18 RX ADMIN — FUROSEMIDE 40 MG: 40 TABLET ORAL at 04:36

## 2019-11-18 RX ADMIN — NYSTATIN: 100000 POWDER TOPICAL at 04:36

## 2019-11-18 RX ADMIN — TIOTROPIUM BROMIDE 1 CAPSULE: 18 CAPSULE ORAL; RESPIRATORY (INHALATION) at 06:33

## 2019-11-18 RX ADMIN — OMEPRAZOLE 20 MG: 20 CAPSULE, DELAYED RELEASE ORAL at 04:36

## 2019-11-18 RX ADMIN — ACETAMINOPHEN 650 MG: 325 TABLET, FILM COATED ORAL at 00:31

## 2019-11-18 RX ADMIN — BUDESONIDE AND FORMOTEROL FUMARATE DIHYDRATE 2 PUFF: 160; 4.5 AEROSOL RESPIRATORY (INHALATION) at 06:33

## 2019-11-18 RX ADMIN — BUDESONIDE AND FORMOTEROL FUMARATE DIHYDRATE 2 PUFF: 160; 4.5 AEROSOL RESPIRATORY (INHALATION) at 21:14

## 2019-11-18 RX ADMIN — GUAIFENESIN 600 MG: 600 TABLET, EXTENDED RELEASE ORAL at 16:30

## 2019-11-18 RX ADMIN — IPRATROPIUM BROMIDE AND ALBUTEROL SULFATE 3 ML: .5; 3 SOLUTION RESPIRATORY (INHALATION) at 21:14

## 2019-11-18 RX ADMIN — IPRATROPIUM BROMIDE AND ALBUTEROL SULFATE 3 ML: .5; 3 SOLUTION RESPIRATORY (INHALATION) at 14:03

## 2019-11-18 RX ADMIN — NYSTATIN: 100000 POWDER TOPICAL at 16:37

## 2019-11-18 RX ADMIN — LEVOTHYROXINE SODIUM 175 MCG: 125 TABLET ORAL at 04:36

## 2019-11-18 ASSESSMENT — ENCOUNTER SYMPTOMS
WHEEZING: 0
DIAPHORESIS: 0
CHILLS: 0
NAUSEA: 0
NECK PAIN: 0
SENSORY CHANGE: 0
DIZZINESS: 0
FOCAL WEAKNESS: 0
SPEECH CHANGE: 0
BACK PAIN: 0
HEADACHES: 0
HEARTBURN: 0
ABDOMINAL PAIN: 0
VOMITING: 0
COUGH: 0
FLANK PAIN: 0
BLURRED VISION: 0
DIARRHEA: 0
NERVOUS/ANXIOUS: 0
FEVER: 0
WEAKNESS: 1
PALPITATIONS: 0
SORE THROAT: 0
SHORTNESS OF BREATH: 1

## 2019-11-18 NOTE — CARE PLAN
Problem: Nutritional:  Goal: Achieve adequate nutritional intake  Description  Patient will consume 50% of meals and supplements   Outcome: PROGRESSING SLOWER THAN EXPECTED    PO intake generally <25%, Boost Plus TID. Attempted interview, pt sleeping. RD will continue to monitor.

## 2019-11-18 NOTE — PROGRESS NOTES
[facility] Medicine Daily Progress Note    Date of Service  11/17/2019    Chief Complaint  69 y.o. female admitted 10/28/2019 with cardiorespiratory arrest.    Hospital Course  69-year-old female with history of COPD on home oxygen, and hypothyroidism presented 10/28 with cardiac arrest, initial intubation went out in her home oxygen she was sent home with oxygen tank however she was found 4 hours later on consciousness outside of her house, the patient had significant hypoxia with hypercapnia, she was intubated she had a PEA arrest s subsequently obtained RosC and then transferred to [facility] for ICU management, on the admission-chest x-ray markedly prominent interstitial lung markings bilateral could be related to chronic interstitial lung disease or edema, her HCO2 was 38 and creatinine 0.8 and no leukocytosis, respiratory therapy for COPD with inhalers and Lasix have been started however developed V. tach arrest and was shocked.  She had improved and eventually extubated.  Developed paroxysmal atrial flutter, initially placed on amiodarone and later discontinued and started on metoprolol 12.5 twice daily and Xarelto was started.    Noted to have bilateral acute rib fractures, and metatarsal fractures on the right foot.        Interval Problem Update  Evaluated and examined the patient at the bedside, she feels she is on her baseline.  Labs showed elevated calcium with elevated PTH, because with the patient and she refused work-up or surgery.  Continue Lasix for crackles  Working for placement      Consultants/Specialty  Critical care   Cardiology  Orthopedic surgery    Code Status  Full    Disposition  SNF    Review of Systems  Review of Systems   Constitutional: Positive for malaise/fatigue. Negative for chills, diaphoresis and fever.   HENT: Negative for hearing loss and sore throat.    Eyes: Negative for blurred vision.   Respiratory: Positive for shortness of breath. Negative for cough and wheezing.     Cardiovascular: Negative for chest pain, palpitations and leg swelling.        Improved    Gastrointestinal: Negative for abdominal pain, diarrhea, heartburn, nausea and vomiting.   Genitourinary: Negative for dysuria, flank pain and hematuria.   Musculoskeletal: Negative for back pain, joint pain and neck pain.   Skin: Negative for rash.   Neurological: Positive for weakness. Negative for dizziness, sensory change, speech change, focal weakness and headaches.   Psychiatric/Behavioral: The patient is not nervous/anxious.         Physical Exam  Temp:  [36.2 °C (97.2 °F)-36.9 °C (98.5 °F)] 36.7 °C (98.1 °F)  Pulse:  [] 100  Resp:  [16-19] 18  BP: ()/(59-72) 99/61  SpO2:  [85 %-96 %] 93 %    Physical Exam  Constitutional:       Appearance: She is obese.   HENT:      Head: Normocephalic and atraumatic.      Nose: No congestion.      Mouth/Throat:      Mouth: Mucous membranes are moist.   Eyes:      Conjunctiva/sclera: Conjunctivae normal.      Pupils: Pupils are equal, round, and reactive to light.   Neck:      Musculoskeletal: No muscular tenderness.   Cardiovascular:      Rate and Rhythm: Normal rate and regular rhythm.   Pulmonary:      Effort: Accessory muscle usage present. No respiratory distress.      Breath sounds: Rales present. No wheezing.   Abdominal:      General: Bowel sounds are normal. There is no distension.      Palpations: Abdomen is soft.      Tenderness: There is no tenderness. There is no guarding or rebound.   Musculoskeletal:      Right lower leg: Edema present.      Left lower leg: Edema present.   Lymphadenopathy:      Cervical: No cervical adenopathy.   Skin:     Findings: Bruising (Right foot) present.   Neurological:      General: No focal deficit present.      Mental Status: She is alert and oriented to person, place, and time.      Cranial Nerves: No cranial nerve deficit.         Fluids    Intake/Output Summary (Last 24 hours) at 11/17/2019 1910  Last data filed at  11/16/2019 2011  Gross per 24 hour   Intake --   Output 1200 ml   Net -1200 ml       Laboratory  Recent Labs     11/15/19  0252 11/16/19  0653   WBC 7.0 6.5   RBC 4.70 4.51   HEMOGLOBIN 14.9 14.2   HEMATOCRIT 49.9* 47.6*   .2* 105.5*   MCH 31.7 31.5   MCHC 29.9* 29.8*   RDW 52.4* 51.8*   PLATELETCT 152* 129*   MPV 11.3 10.7     Recent Labs     11/15/19  0252 11/16/19  0608 11/17/19  0036   SODIUM 143 140 143   POTASSIUM 3.8 4.0 4.0   CHLORIDE 95* 98 98   CO2 42* 38* 39*   GLUCOSE 112* 131* 95   BUN 31* 30* 30*   CREATININE 0.90 0.78 0.83   CALCIUM 11.9* 11.6* 11.1*                   Imaging  DX-CHEST-PORTABLE (1 VIEW)   Final Result         1. No significant interval change.      MR-FOOT-WITH & W/O RIGHT   Final Result      1. A nonenhancing hyperintense T1, isointense T2 1.4 x 4.1 x 4.3 cm area deep to the subcutaneous tissue about the dorsum of the foot with overlying subcutaneous edema. This is nonspecific but could relate to hematoma. Superimposed infection cannot be    entirely excluded.      2. Mildly displaced fractures at the distal fourth and fifth metatarsals. Minimal marrow edema within no definite marrow replacement, suggesting subacute healing fractures. Osteomyelitis is considered less likely.      DX-FOOT-2- RIGHT   Final Result      Soft tissue swelling with subacute appearing fractures involving the necks of the fourth and fifth metatarsals with possible bone resorption which could be due to fractures or osteomyelitis.      DX-CHEST-PORTABLE (1 VIEW)   Final Result      1.  Progressive volume loss in the right lung, with progressive rightward mediastinal shift. Underlying pneumonia and small pleural effusion are possible in the proper clinical settings.   2.  Slight worsening of left basilar atelectasis versus consolidation. No significant left effusion.      DX-CHEST-PORTABLE (1 VIEW)   Final Result      1.  Possible improved consolidation in the right lung versus related to differences in  rotation.   2.  Improved interstitial opacity/edema.   3.  Left basilar atelectasis.      DX-CHEST-PORTABLE (1 VIEW)   Final Result      1.  Removal of endotracheal tube and enteric catheters      2.  No other change      DX-CHEST-PORTABLE (1 VIEW)   Final Result      Improved left upper lobe atelectasis and consolidation      Otherwise stable right worse than left consolidation and atelectasis with probable right pleural fluid      Bilateral acute rib fractures      EC-ECHOCARDIOGRAM COMPLETE W/O CONT   Final Result      DX-ABDOMEN FOR TUBE PLACEMENT   Final Result      Feeding tube in place as noted above.      DX-CHEST-PORTABLE (1 VIEW)   Final Result      Improving right upper lobe atelectasis. No significant change otherwise.      DX-ABDOMEN FOR TUBE PLACEMENT   Final Result      Nasogastric tube in place as noted above.      CT-CTA CHEST PULMONARY ARTERY W/ RECONS   Final Result   Addendum 1 of 1   Addendum dictated on 11/2/2019 by Dr. Peng as follows:   Addendum:   In reviewing the exam there is an incidentally noted inferomedial left    breast mass 19 x 23 mm. I discussed these findings seconds ago with the    intensivist currently covering the patient.      Final      DX-CHEST-PORTABLE (1 VIEW)   Final Result      Diffuse bilateral interstitial opacities, suggesting pulmonary edema. Additionally there are confluent and linear opacities throughout the right perihilar and upper lung field and in the left base, consistent with a combination of pneumonia and    atelectasis. All of this is considerably worse compared with the prior image.           Assessment/Plan  * Acute on chronic respiratory failure with hypoxia and hypercapnia (HCC)- (present on admission)  Assessment & Plan  Intubated 10/28/2019, extubated 10/30  Chronic respiratory acidosis  Echo normal ejection fraction 55% and no pulmonary hypertension  Likely related to severe COPD could be pulmonary fibrosis or obstructive sleep  apnea/hypoventilation syndrome  To her closely and consider BiPAP if needed  RT protocol: And inhalers  Lasix 40 mg PO BID   Patient feels she is on her baseline    Metatarsal bone fracture- (present on admission)  Assessment & Plan  postoperative shoe  Pain control    Chronic obstructive pulmonary disease with acute exacerbation (HCC)- (present on admission)  Assessment & Plan  Symbicort, RT protocol  No signs of pulmonary hypertension on the echo  Continue Lasix and inhalers at this time    Encephalopathy- (present on admission)  Assessment & Plan  Delirium due to acute respiratory failure   Improving   Alert and oriented x3  avoid benzodiazepines and anticholinergics  Frequent orientation  Avoid early morning labs  Avoid vital signs during sleep  Ambulate if possible      Hypophosphatemia- (present on admission)  Assessment & Plan  Follow level    Pulmonary hypertension (HCC)- (present on admission)  Assessment & Plan  Lasix    Left breast mass- (present on admission)  Assessment & Plan  Will need biopsy - awaiting pt's decision      Closed fracture of multiple ribs of both sides- (present on admission)  Assessment & Plan  Acute and previously healed, pain control    Collapse of right lung- (present on admission)  Assessment & Plan  resolved    Paroxysmal atrial flutter (HCC)  Assessment & Plan  Developed RVR   ContinueXarelto  Increase metoprolol to 25 BID     Hypomagnesemia- (present on admission)  Assessment & Plan  IV Mg given  Recheck level    Ventricular tachycardia (HCC)- (present on admission)  Assessment & Plan  Amiodarone stopped on 11/6  Metoprolol 12.5 twice daily        Vitamin B12 deficiency- (present on admission)  Assessment & Plan  Resolved    Hypokalemia- (present on admission)  Assessment & Plan  Kdur  Follow BMP    Hypertension- (present on admission)  Assessment & Plan  Decreased metoprolol to 12.5 mg twice daily with parameters placed  Controlled continue monitoring    Other specified  hypothyroidism- (present on admission)  Assessment & Plan  Synthroid increased to 175 mcg on 11/1/2019  Needs repeat TSH on 12/1/2019    Serum calcium elevated  Assessment & Plan  High PTH and phosphorus is low  Calcium around 11  Most likely primary hyperparathyroidism  Discussed this finding with the patient she refused surgery or work-up.  She returns for follow-up    Urinary retention  Assessment & Plan  Resolved    Thrombocytopenia (HCC)- (present on admission)  Assessment & Plan  Follow cbc    Morbid obesity (HCC)- (present on admission)  Assessment & Plan  Body mass index is 49.78 kg/m².       VTE prophylaxis: Xarelto      I certify that the patient requires continue with medically necessary hospital services for the treatment of COPD exacerbation and acute respiratory failure and will remain in the hospital for couple days.  Discharge plan is anticipated to be on Monday if she is accepted by skilled nursing

## 2019-11-18 NOTE — DISCHARGE PLANNING
Anticipated Discharge Disposition:   · SNF: [facility] [id number] [place] [address]    Action:   · Pt approved to go to [facility].   · Transportation form/REMSA form completed and faxed to [facility] ext [phone] to set up transport for pt to go to SNF on 11/20/2019  · Requested transport be scheduled through [employer]    Barriers to Discharge:   · Pending transport set up    Plan:   · Care coordination will continue to follow up and assist with discharge services/barriers.

## 2019-11-18 NOTE — DISCHARGE PLANNING
Agency/Facility Name: [facility]  Spoke To: Maris (VM)  Outcome: Attempted to get status on referral, however there was no answer, left message.    Agency/Facility Name: [facility]  Spoke To: Tracee  Outcome: They and the doctor feel that this would not be a safe discharge as they are over an hour away from a large facility and with the patient still coding it just is not safe.    Agency/Facility Name: [facility]  Spoke To: Alicia (VM)  Outcome: Attempted to get status on referral, however there was no answer, left message.    [facility]Facility Name: [facility]  Spoke To: MYA  Outcome: Attempted to get status on referral, however there was no answer, left message.

## 2019-11-18 NOTE — DISCHARGE PLANNING
[facility]Facility Name: [facility]  Spoke To: Noah  Outcome: Patient accepted, we just need a skilled nursing order.

## 2019-11-18 NOTE — PROGRESS NOTES
Received report from Ramana ESPINO, at pt bedside. Pt resting at this time, POC discussed. Call light and belongings within reach. Bed locked and in low position. Alarm on and fall precautions in place.

## 2019-11-18 NOTE — DISCHARGE PLANNING
Received Transport Form @ 4561  Spoke to Alecia @ [facility]    Transport is scheduled for 11/20 @0800 going to [place].    [facility] needs to get auth for this trip, they will call [facility] if they have any further questions.

## 2019-11-19 PROCEDURE — 700102 HCHG RX REV CODE 250 W/ 637 OVERRIDE(OP): Performed by: HOSPITALIST

## 2019-11-19 PROCEDURE — A9270 NON-COVERED ITEM OR SERVICE: HCPCS | Performed by: INTERNAL MEDICINE

## 2019-11-19 PROCEDURE — 94640 AIRWAY INHALATION TREATMENT: CPT

## 2019-11-19 PROCEDURE — 94668 MNPJ CHEST WALL SBSQ: CPT

## 2019-11-19 PROCEDURE — 97530 THERAPEUTIC ACTIVITIES: CPT

## 2019-11-19 PROCEDURE — 94669 MECHANICAL CHEST WALL OSCILL: CPT

## 2019-11-19 PROCEDURE — 770020 HCHG ROOM/CARE - TELE (206)

## 2019-11-19 PROCEDURE — 700101 HCHG RX REV CODE 250: Performed by: HOSPITALIST

## 2019-11-19 PROCEDURE — 700102 HCHG RX REV CODE 250 W/ 637 OVERRIDE(OP): Performed by: FAMILY MEDICINE

## 2019-11-19 PROCEDURE — 700102 HCHG RX REV CODE 250 W/ 637 OVERRIDE(OP): Performed by: INTERNAL MEDICINE

## 2019-11-19 PROCEDURE — A9270 NON-COVERED ITEM OR SERVICE: HCPCS | Performed by: HOSPITALIST

## 2019-11-19 PROCEDURE — 700101 HCHG RX REV CODE 250: Performed by: INTERNAL MEDICINE

## 2019-11-19 PROCEDURE — 97535 SELF CARE MNGMENT TRAINING: CPT

## 2019-11-19 PROCEDURE — A9270 NON-COVERED ITEM OR SERVICE: HCPCS | Performed by: FAMILY MEDICINE

## 2019-11-19 PROCEDURE — 99231 SBSQ HOSP IP/OBS SF/LOW 25: CPT | Performed by: INTERNAL MEDICINE

## 2019-11-19 RX ORDER — ALBUTEROL SULFATE 90 UG/1
2 AEROSOL, METERED RESPIRATORY (INHALATION) EVERY 4 HOURS PRN
Status: DISCONTINUED | OUTPATIENT
Start: 2019-11-19 | End: 2019-11-21 | Stop reason: HOSPADM

## 2019-11-19 RX ORDER — GUAIFENESIN 600 MG/1
600 TABLET, EXTENDED RELEASE ORAL EVERY 12 HOURS
Qty: 28 TAB
Start: 2019-11-19

## 2019-11-19 RX ORDER — TIOTROPIUM BROMIDE 18 UG/1
18 CAPSULE ORAL; RESPIRATORY (INHALATION) DAILY
Qty: 30 CAP | Refills: 3
Start: 2019-11-20

## 2019-11-19 RX ORDER — BUDESONIDE AND FORMOTEROL FUMARATE DIHYDRATE 160; 4.5 UG/1; UG/1
2 AEROSOL RESPIRATORY (INHALATION) 2 TIMES DAILY
Start: 2019-11-19

## 2019-11-19 RX ORDER — ALBUTEROL SULFATE 90 UG/1
2 AEROSOL, METERED RESPIRATORY (INHALATION) EVERY 4 HOURS PRN
Qty: 8.5 G
Start: 2019-11-19

## 2019-11-19 RX ORDER — OMEPRAZOLE 20 MG/1
20 CAPSULE, DELAYED RELEASE ORAL DAILY
Qty: 30 CAP
Start: 2019-11-20

## 2019-11-19 RX ORDER — IPRATROPIUM BROMIDE AND ALBUTEROL SULFATE 2.5; .5 MG/3ML; MG/3ML
3 SOLUTION RESPIRATORY (INHALATION) EVERY 4 HOURS PRN
Qty: 30 BULLET
Start: 2019-11-19

## 2019-11-19 RX ORDER — LIDOCAINE 50 MG/G
1 PATCH TOPICAL EVERY 24 HOURS
Qty: 10 PATCH
Start: 2019-11-19

## 2019-11-19 RX ORDER — FUROSEMIDE 40 MG/1
40 TABLET ORAL 2 TIMES DAILY
Qty: 30 TAB
Start: 2019-11-19

## 2019-11-19 RX ADMIN — FUROSEMIDE 40 MG: 40 TABLET ORAL at 04:46

## 2019-11-19 RX ADMIN — OMEPRAZOLE 20 MG: 20 CAPSULE, DELAYED RELEASE ORAL at 04:46

## 2019-11-19 RX ADMIN — TIOTROPIUM BROMIDE 1 CAPSULE: 18 CAPSULE ORAL; RESPIRATORY (INHALATION) at 06:59

## 2019-11-19 RX ADMIN — IPRATROPIUM BROMIDE AND ALBUTEROL SULFATE 3 ML: .5; 3 SOLUTION RESPIRATORY (INHALATION) at 18:43

## 2019-11-19 RX ADMIN — BUDESONIDE AND FORMOTEROL FUMARATE DIHYDRATE 2 PUFF: 160; 4.5 AEROSOL RESPIRATORY (INHALATION) at 06:59

## 2019-11-19 RX ADMIN — GUAIFENESIN 600 MG: 600 TABLET, EXTENDED RELEASE ORAL at 17:12

## 2019-11-19 RX ADMIN — IPRATROPIUM BROMIDE AND ALBUTEROL SULFATE 3 ML: .5; 3 SOLUTION RESPIRATORY (INHALATION) at 05:17

## 2019-11-19 RX ADMIN — GUAIFENESIN 600 MG: 600 TABLET, EXTENDED RELEASE ORAL at 04:46

## 2019-11-19 RX ADMIN — BUDESONIDE AND FORMOTEROL FUMARATE DIHYDRATE 2 PUFF: 160; 4.5 AEROSOL RESPIRATORY (INHALATION) at 18:43

## 2019-11-19 RX ADMIN — ACETAMINOPHEN 325 MG: 325 TABLET, FILM COATED ORAL at 21:56

## 2019-11-19 RX ADMIN — LIDOCAINE 1 PATCH: 50 PATCH TOPICAL at 17:12

## 2019-11-19 RX ADMIN — IPRATROPIUM BROMIDE AND ALBUTEROL SULFATE 3 ML: .5; 3 SOLUTION RESPIRATORY (INHALATION) at 15:17

## 2019-11-19 RX ADMIN — NYSTATIN: 100000 POWDER TOPICAL at 17:12

## 2019-11-19 RX ADMIN — RIVAROXABAN 20 MG: 20 TABLET, FILM COATED ORAL at 17:12

## 2019-11-19 RX ADMIN — IPRATROPIUM BROMIDE AND ALBUTEROL SULFATE 3 ML: .5; 3 SOLUTION RESPIRATORY (INHALATION) at 11:03

## 2019-11-19 RX ADMIN — NYSTATIN: 100000 POWDER TOPICAL at 04:46

## 2019-11-19 RX ADMIN — LEVOTHYROXINE SODIUM 175 MCG: 125 TABLET ORAL at 04:46

## 2019-11-19 RX ADMIN — FUROSEMIDE 40 MG: 40 TABLET ORAL at 17:12

## 2019-11-19 ASSESSMENT — COGNITIVE AND FUNCTIONAL STATUS - GENERAL
TOILETING: TOTAL
DRESSING REGULAR UPPER BODY CLOTHING: A LITTLE
PERSONAL GROOMING: A LITTLE
SUGGESTED CMS G CODE MODIFIER DAILY ACTIVITY: CK
DRESSING REGULAR LOWER BODY CLOTHING: A LOT
DAILY ACTIVITIY SCORE: 15
HELP NEEDED FOR BATHING: A LOT

## 2019-11-19 NOTE — CARE PLAN
Problem: Safety  Goal: Will remain free from injury  Outcome: PROGRESSING AS EXPECTED  Goal: Will remain free from falls  Outcome: PROGRESSING AS EXPECTED     Problem: Infection  Goal: Will remain free from infection  Outcome: PROGRESSING AS EXPECTED     Problem: Bowel/Gastric:  Goal: Will not experience complications related to bowel motility  Outcome: PROGRESSING AS EXPECTED     Problem: Knowledge Deficit  Goal: Knowledge of disease process/condition, treatment plan, diagnostic tests, and medications will improve  Outcome: PROGRESSING AS EXPECTED  Goal: Knowledge of the prescribed therapeutic regimen will improve  Outcome: PROGRESSING AS EXPECTED     Problem: Discharge Barriers/Planning  Goal: Patient's continuum of care needs will be met  Outcome: PROGRESSING AS EXPECTED     Problem: Fluid Volume:  Goal: Will maintain balanced intake and output  Outcome: PROGRESSING AS EXPECTED     Problem: Psychosocial Needs:  Goal: Level of anxiety will decrease  Outcome: PROGRESSING AS EXPECTED     Problem: Urinary Elimination:  Goal: Ability to reestablish a normal urinary elimination pattern will improve  Outcome: PROGRESSING AS EXPECTED  Note:   Patient is incontinent     Problem: Respiratory:  Goal: Respiratory status will improve  Outcome: PROGRESSING SLOWER THAN EXPECTED  Note:   Patient requiring 4L O2 oxymask     Problem: Mobility  Goal: Risk for activity intolerance will decrease  Outcome: PROGRESSING SLOWER THAN EXPECTED  Note:   Patient refuses to work with PT/OT, refuses turns

## 2019-11-19 NOTE — PROGRESS NOTES
Monitor Summary:    Rhythm: SR-ST, A fib/flutter  Rate:   Ectopy: rare PVC, coup  Intervals: -/.08/-

## 2019-11-19 NOTE — CARE PLAN
Problem: Bronchoconstriction:  Goal: Improve in air movement and diminished wheezing  Outcome: PROGRESSING AS EXPECTED     Problem: Oxygenation:  Goal: Maintain adequate oxygenation dependent on patient condition  Outcome: PROGRESSING AS EXPECTED     Problem: Bronchopulmonary Hygiene:  Goal: Increase mobilization of retained secretions  Outcome: PROGRESSING AS EXPECTED   Home reg Duo QID  3L home O2  Flutter with treatment

## 2019-11-19 NOTE — CARE PLAN
Discussed with patient the importance to use call light when assistance is needed. Patient verbalized understanding. Patient has bed alarm on, call light within reach, treaded socks on, and hourly rounding in place. Patient educated regarding activity, diet, meds and plan of care. Patient verbalized understanding.

## 2019-11-19 NOTE — DISCHARGE PLANNING
Agency/Facility Name: [facility]  Spoke To: Karishma  Outcome: Auth approved for transportation with [facility] [place] to [place]. Transportation scheduled for 11/20/2019 at 0800

## 2019-11-19 NOTE — PROGRESS NOTES
[facility] Medicine Daily Progress Note    Date of Service  11/18/2019    Chief Complaint  69 y.o. female admitted 10/28/2019 with cardiorespiratory arrest.    Hospital Course  69-year-old female with history of COPD on home oxygen, and hypothyroidism presented 10/28 with cardiac arrest, initial intubation went out in her home oxygen she was sent home with oxygen tank however she was found 4 hours later on consciousness outside of her house, the patient had significant hypoxia with hypercapnia, she was intubated she had a PEA arrest s subsequently obtained RosC and then transferred to [facility] for ICU management, on the admission-chest x-ray markedly prominent interstitial lung markings bilateral could be related to chronic interstitial lung disease or edema, her HCO2 was 38 and creatinine 0.8 and no leukocytosis, respiratory therapy for COPD with inhalers and Lasix have been started however developed V. tach arrest and was shocked.  She had improved and eventually extubated.  Developed paroxysmal atrial flutter, initially placed on amiodarone and later discontinued and started on metoprolol 12.5 twice daily and Xarelto was started.    Noted to have bilateral acute rib fractures, and metatarsal fractures on the right foot.        Interval Problem Update  On her baseline for shortness of breath and COPD  Waiting for placement to SNF    Consultants/Specialty  Critical care   Cardiology  Orthopedic surgery    Code Status  Full    Disposition  SNF    Review of Systems  Review of Systems   Constitutional: Positive for malaise/fatigue. Negative for chills, diaphoresis and fever.   HENT: Negative for hearing loss and sore throat.    Eyes: Negative for blurred vision.   Respiratory: Positive for shortness of breath. Negative for cough and wheezing.    Cardiovascular: Negative for chest pain, palpitations and leg swelling.        Improved    Gastrointestinal: Negative for abdominal pain, diarrhea, heartburn, nausea and vomiting.    Genitourinary: Negative for dysuria, flank pain and hematuria.   Musculoskeletal: Negative for back pain, joint pain and neck pain.   Skin: Negative for rash.   Neurological: Positive for weakness. Negative for dizziness, sensory change, speech change, focal weakness and headaches.   Psychiatric/Behavioral: The patient is not nervous/anxious.         Physical Exam  Temp:  [36.4 °C (97.6 °F)-37.1 °C (98.7 °F)] 37 °C (98.6 °F)  Pulse:  [] 110  Resp:  [15-18] 16  BP: ()/(64-76) 91/69  SpO2:  [88 %-96 %] 91 %    Physical Exam  Constitutional:       Appearance: She is obese.   HENT:      Head: Normocephalic and atraumatic.      Nose: No congestion.      Mouth/Throat:      Mouth: Mucous membranes are moist.   Eyes:      Conjunctiva/sclera: Conjunctivae normal.      Pupils: Pupils are equal, round, and reactive to light.   Neck:      Musculoskeletal: No muscular tenderness.   Cardiovascular:      Rate and Rhythm: Normal rate and regular rhythm.   Pulmonary:      Effort: Accessory muscle usage present. No respiratory distress.      Breath sounds: Rales present. No wheezing.   Abdominal:      General: Bowel sounds are normal. There is no distension.      Palpations: Abdomen is soft.      Tenderness: There is no tenderness. There is no guarding or rebound.   Musculoskeletal:      Right lower leg: Edema present.      Left lower leg: Edema present.   Lymphadenopathy:      Cervical: No cervical adenopathy.   Skin:     Findings: Bruising (Right foot) present.   Neurological:      General: No focal deficit present.      Mental Status: She is alert and oriented to person, place, and time.      Cranial Nerves: No cranial nerve deficit.         Fluids  No intake or output data in the 24 hours ending 11/18/19 2021    Laboratory  Recent Labs     11/16/19  0653   WBC 6.5   RBC 4.51   HEMOGLOBIN 14.2   HEMATOCRIT 47.6*   .5*   MCH 31.5   MCHC 29.8*   RDW 51.8*   PLATELETCT 129*   MPV 10.7     Recent Labs      11/16/19  0608 11/17/19  0036 11/18/19  0600   SODIUM 140 143 143   POTASSIUM 4.0 4.0 3.9   CHLORIDE 98 98 98   CO2 38* 39* 40*   GLUCOSE 131* 95 116*   BUN 30* 30* 31*   CREATININE 0.78 0.83 0.88   CALCIUM 11.6* 11.1* 11.6*                   Imaging  DX-CHEST-PORTABLE (1 VIEW)   Final Result         1. No significant interval change.      MR-FOOT-WITH & W/O RIGHT   Final Result      1. A nonenhancing hyperintense T1, isointense T2 1.4 x 4.1 x 4.3 cm area deep to the subcutaneous tissue about the dorsum of the foot with overlying subcutaneous edema. This is nonspecific but could relate to hematoma. Superimposed infection cannot be    entirely excluded.      2. Mildly displaced fractures at the distal fourth and fifth metatarsals. Minimal marrow edema within no definite marrow replacement, suggesting subacute healing fractures. Osteomyelitis is considered less likely.      DX-FOOT-2- RIGHT   Final Result      Soft tissue swelling with subacute appearing fractures involving the necks of the fourth and fifth metatarsals with possible bone resorption which could be due to fractures or osteomyelitis.      DX-CHEST-PORTABLE (1 VIEW)   Final Result      1.  Progressive volume loss in the right lung, with progressive rightward mediastinal shift. Underlying pneumonia and small pleural effusion are possible in the proper clinical settings.   2.  Slight worsening of left basilar atelectasis versus consolidation. No significant left effusion.      DX-CHEST-PORTABLE (1 VIEW)   Final Result      1.  Possible improved consolidation in the right lung versus related to differences in rotation.   2.  Improved interstitial opacity/edema.   3.  Left basilar atelectasis.      DX-CHEST-PORTABLE (1 VIEW)   Final Result      1.  Removal of endotracheal tube and enteric catheters      2.  No other change      DX-CHEST-PORTABLE (1 VIEW)   Final Result      Improved left upper lobe atelectasis and consolidation      Otherwise stable right  worse than left consolidation and atelectasis with probable right pleural fluid      Bilateral acute rib fractures      EC-ECHOCARDIOGRAM COMPLETE W/O CONT   Final Result      DX-ABDOMEN FOR TUBE PLACEMENT   Final Result      Feeding tube in place as noted above.      DX-CHEST-PORTABLE (1 VIEW)   Final Result      Improving right upper lobe atelectasis. No significant change otherwise.      DX-ABDOMEN FOR TUBE PLACEMENT   Final Result      Nasogastric tube in place as noted above.      CT-CTA CHEST PULMONARY ARTERY W/ RECONS   Final Result   Addendum 1 of 1   Addendum dictated on 11/2/2019 by Dr. Peng as follows:   Addendum:   In reviewing the exam there is an incidentally noted inferomedial left    breast mass 19 x 23 mm. I discussed these findings seconds ago with the    intensivist currently covering the patient.      Final      DX-CHEST-PORTABLE (1 VIEW)   Final Result      Diffuse bilateral interstitial opacities, suggesting pulmonary edema. Additionally there are confluent and linear opacities throughout the right perihilar and upper lung field and in the left base, consistent with a combination of pneumonia and    atelectasis. All of this is considerably worse compared with the prior image.           Assessment/Plan  * Acute on chronic respiratory failure with hypoxia and hypercapnia (HCC)- (present on admission)  Assessment & Plan  Intubated 10/28/2019, extubated 10/30  Chronic respiratory acidosis  Echo normal ejection fraction 55% and no pulmonary hypertension  Likely related to severe COPD could be pulmonary fibrosis or obstructive sleep apnea/hypoventilation syndrome  To her closely and consider BiPAP if needed  RT protocol: And inhalers  Lasix 40 mg PO BID   Patient feels she is on her baseline    Metatarsal bone fracture- (present on admission)  Assessment & Plan  postoperative shoe  Pain control    Chronic obstructive pulmonary disease with acute exacerbation (HCC)- (present on  admission)  Assessment & Plan  Symbicort, RT protocol  No signs of pulmonary hypertension on the echo  Continue Lasix and inhalers at this time    Encephalopathy- (present on admission)  Assessment & Plan  Delirium due to acute respiratory failure   Improving   Alert and oriented x3  avoid benzodiazepines and anticholinergics  Frequent orientation  Avoid early morning labs  Avoid vital signs during sleep  Ambulate if possible      Hypophosphatemia- (present on admission)  Assessment & Plan  Follow level    Pulmonary hypertension (HCC)- (present on admission)  Assessment & Plan  Lasix    Left breast mass- (present on admission)  Assessment & Plan  Will need biopsy - awaiting pt's decision      Closed fracture of multiple ribs of both sides- (present on admission)  Assessment & Plan  Acute and previously healed, pain control    Collapse of right lung- (present on admission)  Assessment & Plan  resolved    Paroxysmal atrial flutter (HCC)  Assessment & Plan  Developed RVR   ContinueXarelto  Increase metoprolol to 25 BID     Hypomagnesemia- (present on admission)  Assessment & Plan  IV Mg given  Recheck level    Ventricular tachycardia (HCC)- (present on admission)  Assessment & Plan  Amiodarone stopped on 11/6  Metoprolol 12.5 twice daily        Vitamin B12 deficiency- (present on admission)  Assessment & Plan  Resolved    Hypokalemia- (present on admission)  Assessment & Plan  Kdur  Follow BMP    Hypertension- (present on admission)  Assessment & Plan  Decreased metoprolol to 12.5 mg twice daily with parameters placed  Controlled continue monitoring    Other specified hypothyroidism- (present on admission)  Assessment & Plan  Synthroid increased to 175 mcg on 11/1/2019  Needs repeat TSH on 12/1/2019    Serum calcium elevated  Assessment & Plan  High PTH and phosphorus is low  Calcium around 11  Most likely primary hyperparathyroidism  Discussed this finding with the patient she refused surgery or work-up.  She returns  for follow-up    Urinary retention  Assessment & Plan  Resolved    Thrombocytopenia (HCC)- (present on admission)  Assessment & Plan  Follow cbc    Morbid obesity (HCC)- (present on admission)  Assessment & Plan  Body mass index is 49.78 kg/m².       VTE prophylaxis: Xarelto      I certify that the patient requires continue with medically necessary hospital services for the treatment of COPD exacerbation and acute respiratory failure and will remain in the hospital for couple days.  Discharge plan is anticipated to be on Monday if she is accepted by skilled nursing

## 2019-11-19 NOTE — PROGRESS NOTES
[facility] Medicine Daily Progress Note    Date of Service  11/19/2019    Chief Complaint  69 y.o. female admitted 10/28/2019 with cardiorespiratory arrest.    Hospital Course  69-year-old female with history of COPD on home oxygen, and hypothyroidism presented 10/28 with cardiac arrest, initial intubation went out in her home oxygen she was sent home with oxygen tank however she was found 4 hours later on consciousness outside of her house, the patient had significant hypoxia with hypercapnia, she was intubated she had a PEA arrest s subsequently obtained RosC and then transferred to [facility] for ICU management, on the admission-chest x-ray markedly prominent interstitial lung markings bilateral could be related to chronic interstitial lung disease or edema, her HCO2 was 38 and creatinine 0.8 and no leukocytosis, respiratory therapy for COPD with inhalers and Lasix have been started however developed V. tach arrest and was shocked.  She had improved and eventually extubated.  Developed paroxysmal atrial flutter, initially placed on amiodarone and later discontinued and started on metoprolol 12.5 twice daily and Xarelto was started. Noted to have bilateral acute rib fractures, and metatarsal fractures on the right foot.        Interval Problem Update  11/19/2019 - I reviewed the patient's chart. There were no significant overnight events. Remains hemodynamically stable and afebrile. Stable on 4L O2 NC.  No recent labs.  SNF placement has been arranged, scheduled on 11/20/2019.    > I have personally seen and examined the patient today.  She has no complaints.  She is comfortable.  She is not short of breath, and has no complaints of chest pain.  No nausea or vomiting.  No fevers or chills.      Consultants/Specialty  Critical care   Cardiology  Orthopedic surgery    Code Status  Full    Disposition  SNF    Review of Systems  Review of Systems      Pertinent positives/negatives as mentioned above.     A complete review  of systems was personally done by me. All other systems were negative.       Physical Exam  Temp:  [36.5 °C (97.7 °F)-37.2 °C (99 °F)] 36.5 °C (97.7 °F)  Pulse:  [] 85  Resp:  [15-19] 19  BP: ()/(66-76) 107/67  SpO2:  [90 %-92 %] 91 %    Physical Exam  Constitutional:       Appearance: She is obese.   HENT:      Head: Normocephalic and atraumatic.      Nose: No congestion.      Mouth/Throat:      Mouth: Mucous membranes are moist.   Eyes:      Conjunctiva/sclera: Conjunctivae normal.      Pupils: Pupils are equal, round, and reactive to light.   Neck:      Musculoskeletal: No muscular tenderness.   Cardiovascular:      Rate and Rhythm: Normal rate and regular rhythm.   Pulmonary:      Effort: Accessory muscle usage present. No respiratory distress.      Breath sounds: Rales present. No wheezing.   Abdominal:      General: Bowel sounds are normal. There is no distension.      Palpations: Abdomen is soft.      Tenderness: There is no tenderness. There is no guarding or rebound.   Musculoskeletal:      Right lower leg: Edema present.      Left lower leg: Edema present.   Lymphadenopathy:      Cervical: No cervical adenopathy.   Skin:     Findings: Bruising (Right foot) present.   Neurological:      General: No focal deficit present.      Mental Status: She is alert and oriented to person, place, and time.      Cranial Nerves: No cranial nerve deficit.         I have performed the physical examination today 11/19/2019.  In review of yesterday's note, there are no new changes except as documented above.        Fluids  No intake or output data in the 24 hours ending 11/19/19 0933    Laboratory      Recent Labs     11/17/19  0036 11/18/19  0600   SODIUM 143 143   POTASSIUM 4.0 3.9   CHLORIDE 98 98   CO2 39* 40*   GLUCOSE 95 116*   BUN 30* 31*   CREATININE 0.83 0.88   CALCIUM 11.1* 11.6*                   Imaging  DX-CHEST-PORTABLE (1 VIEW)   Final Result         1. No significant interval change.       MR-FOOT-WITH & W/O RIGHT   Final Result      1. A nonenhancing hyperintense T1, isointense T2 1.4 x 4.1 x 4.3 cm area deep to the subcutaneous tissue about the dorsum of the foot with overlying subcutaneous edema. This is nonspecific but could relate to hematoma. Superimposed infection cannot be    entirely excluded.      2. Mildly displaced fractures at the distal fourth and fifth metatarsals. Minimal marrow edema within no definite marrow replacement, suggesting subacute healing fractures. Osteomyelitis is considered less likely.      DX-FOOT-2- RIGHT   Final Result      Soft tissue swelling with subacute appearing fractures involving the necks of the fourth and fifth metatarsals with possible bone resorption which could be due to fractures or osteomyelitis.      DX-CHEST-PORTABLE (1 VIEW)   Final Result      1.  Progressive volume loss in the right lung, with progressive rightward mediastinal shift. Underlying pneumonia and small pleural effusion are possible in the proper clinical settings.   2.  Slight worsening of left basilar atelectasis versus consolidation. No significant left effusion.      DX-CHEST-PORTABLE (1 VIEW)   Final Result      1.  Possible improved consolidation in the right lung versus related to differences in rotation.   2.  Improved interstitial opacity/edema.   3.  Left basilar atelectasis.      DX-CHEST-PORTABLE (1 VIEW)   Final Result      1.  Removal of endotracheal tube and enteric catheters      2.  No other change      DX-CHEST-PORTABLE (1 VIEW)   Final Result      Improved left upper lobe atelectasis and consolidation      Otherwise stable right worse than left consolidation and atelectasis with probable right pleural fluid      Bilateral acute rib fractures      EC-ECHOCARDIOGRAM COMPLETE W/O CONT   Final Result      DX-ABDOMEN FOR TUBE PLACEMENT   Final Result      Feeding tube in place as noted above.      DX-CHEST-PORTABLE (1 VIEW)   Final Result      Improving right upper lobe  atelectasis. No significant change otherwise.      DX-ABDOMEN FOR TUBE PLACEMENT   Final Result      Nasogastric tube in place as noted above.      CT-CTA CHEST PULMONARY ARTERY W/ RECONS   Final Result   Addendum 1 of 1   Addendum dictated on 11/2/2019 by Dr. Peng as follows:   Addendum:   In reviewing the exam there is an incidentally noted inferomedial left    breast mass 19 x 23 mm. I discussed these findings seconds ago with the    intensivist currently covering the patient.      Final      DX-CHEST-PORTABLE (1 VIEW)   Final Result      Diffuse bilateral interstitial opacities, suggesting pulmonary edema. Additionally there are confluent and linear opacities throughout the right perihilar and upper lung field and in the left base, consistent with a combination of pneumonia and    atelectasis. All of this is considerably worse compared with the prior image.           Assessment/Plan  * Acute on chronic respiratory failure with hypoxia and hypercapnia (HCC)- (present on admission)  Assessment & Plan  Intubated 10/28/2019, extubated 10/30  Chronic respiratory acidosis  Echo normal ejection fraction 55% and no pulmonary hypertension  Likely related to severe COPD could be pulmonary fibrosis or obstructive sleep apnea/hypoventilation syndrome  To her closely and consider BiPAP if needed  RT protocol: And inhalers  Lasix 40 mg PO BID   Patient feels she is on her baseline    Metatarsal bone fracture- (present on admission)  Assessment & Plan  postoperative shoe  Pain control    Chronic obstructive pulmonary disease with acute exacerbation (HCC)- (present on admission)  Assessment & Plan  Symbicort, RT protocol  No signs of pulmonary hypertension on the echo  Continue Lasix and inhalers at this time    Encephalopathy- (present on admission)  Assessment & Plan  Delirium due to acute respiratory failure   Improving   Alert and oriented x3  avoid benzodiazepines and anticholinergics  Frequent orientation  Avoid early  morning labs  Avoid vital signs during sleep  Ambulate if possible      Hypophosphatemia- (present on admission)  Assessment & Plan  Follow level    Pulmonary hypertension (HCC)- (present on admission)  Assessment & Plan  Lasix    Left breast mass- (present on admission)  Assessment & Plan  Will need biopsy - awaiting pt's decision      Closed fracture of multiple ribs of both sides- (present on admission)  Assessment & Plan  Acute and previously healed, pain control    Collapse of right lung- (present on admission)  Assessment & Plan  resolved    Paroxysmal atrial flutter (HCC)  Assessment & Plan  Developed RVR   ContinueXarelto  Increase metoprolol to 25 BID     Hypomagnesemia- (present on admission)  Assessment & Plan  IV Mg given  Recheck level    Ventricular tachycardia (HCC)- (present on admission)  Assessment & Plan  Amiodarone stopped on 11/6  Metoprolol 12.5 twice daily        Vitamin B12 deficiency- (present on admission)  Assessment & Plan  Resolved    Hypokalemia- (present on admission)  Assessment & Plan  Kdur  Follow BMP    Hypertension- (present on admission)  Assessment & Plan  Decreased metoprolol to 12.5 mg twice daily with parameters placed  Controlled continue monitoring    Other specified hypothyroidism- (present on admission)  Assessment & Plan  Synthroid increased to 175 mcg on 11/1/2019  Needs repeat TSH on 12/1/2019    Serum calcium elevated  Assessment & Plan  High PTH and phosphorus is low  Calcium around 11  Most likely primary hyperparathyroidism  Discussed this finding with the patient she refused surgery or work-up.  She returns for follow-up    Urinary retention  Assessment & Plan  Resolved    Thrombocytopenia (HCC)- (present on admission)  Assessment & Plan  Follow cbc    Morbid obesity (HCC)- (present on admission)  Assessment & Plan  Body mass index is 49.78 kg/m².       VTE prophylaxis: Xarelto      - patient scheduled to discharge to the SNF tomorrow early morning.  Will get  discharge papers ready tonight.    I have performed the physical examination, and reviewed updated ROS and plan today 11/19/2019.  In review of yesterday's note, there are no new changes except as documented above.

## 2019-11-19 NOTE — THERAPY
"Occupational Therapy Treatment completed with focus on ADLs and ADL transfers.  Functional Status:  Mod A supine to sit.  Max A to don socks.  Pt washed face while seated EOB.  Pt refused to comb hair.  Pt required encouragement to hold cup and drink on her own: pt continues to want simple self-care tasks done for her.  Pt educated on importance of OOB activity and completing simple ADL's on her own.  Pt attempted stand x2 with max A although pt unable to lift bottom from bed.  Pt did not appear to give full effort.  Pt requested to stay EOB at end of session.  Plan of Care: Will benefit from Occupational Therapy 3 times per week  Discharge Recommendations:  Equipment Will Continue to Assess for Equipment Needs. .Recommend post-acute placement for additional occupational therapy services prior to discharge home.     Pt seen for OT tx.  Pt able to sit EOB and complete in some simple ADL's with max encouragement to participate and attempt tasks on her own.  Pt continues to have very limited activity tolerance and strength; resulting in poor self-care participation and ability to be OOB.  Pt will continue to benefit from acute OT services.    See \"Rehab Therapy-Acute\" Patient Summary Report for complete documentation.   "

## 2019-11-20 PROCEDURE — 770020 HCHG ROOM/CARE - TELE (206)

## 2019-11-20 PROCEDURE — 700102 HCHG RX REV CODE 250 W/ 637 OVERRIDE(OP): Performed by: INTERNAL MEDICINE

## 2019-11-20 PROCEDURE — 99231 SBSQ HOSP IP/OBS SF/LOW 25: CPT | Performed by: INTERNAL MEDICINE

## 2019-11-20 PROCEDURE — A9270 NON-COVERED ITEM OR SERVICE: HCPCS | Performed by: INTERNAL MEDICINE

## 2019-11-20 PROCEDURE — 94640 AIRWAY INHALATION TREATMENT: CPT

## 2019-11-20 PROCEDURE — 94668 MNPJ CHEST WALL SBSQ: CPT

## 2019-11-20 PROCEDURE — A9270 NON-COVERED ITEM OR SERVICE: HCPCS | Performed by: HOSPITALIST

## 2019-11-20 PROCEDURE — 700101 HCHG RX REV CODE 250: Performed by: INTERNAL MEDICINE

## 2019-11-20 PROCEDURE — 700102 HCHG RX REV CODE 250 W/ 637 OVERRIDE(OP): Performed by: FAMILY MEDICINE

## 2019-11-20 PROCEDURE — 700102 HCHG RX REV CODE 250 W/ 637 OVERRIDE(OP): Performed by: HOSPITALIST

## 2019-11-20 PROCEDURE — 94669 MECHANICAL CHEST WALL OSCILL: CPT

## 2019-11-20 PROCEDURE — A9270 NON-COVERED ITEM OR SERVICE: HCPCS | Performed by: FAMILY MEDICINE

## 2019-11-20 RX ADMIN — IPRATROPIUM BROMIDE AND ALBUTEROL SULFATE 3 ML: .5; 3 SOLUTION RESPIRATORY (INHALATION) at 14:51

## 2019-11-20 RX ADMIN — FUROSEMIDE 40 MG: 40 TABLET ORAL at 04:50

## 2019-11-20 RX ADMIN — ALBUTEROL SULFATE 2 PUFF: 90 AEROSOL, METERED RESPIRATORY (INHALATION) at 01:49

## 2019-11-20 RX ADMIN — GUAIFENESIN 600 MG: 600 TABLET, EXTENDED RELEASE ORAL at 17:29

## 2019-11-20 RX ADMIN — ACETAMINOPHEN 650 MG: 325 TABLET, FILM COATED ORAL at 07:41

## 2019-11-20 RX ADMIN — NYSTATIN: 100000 POWDER TOPICAL at 17:29

## 2019-11-20 RX ADMIN — NYSTATIN: 100000 POWDER TOPICAL at 06:00

## 2019-11-20 RX ADMIN — SENNOSIDES AND DOCUSATE SODIUM 2 TABLET: 8.6; 5 TABLET ORAL at 17:29

## 2019-11-20 RX ADMIN — BUDESONIDE AND FORMOTEROL FUMARATE DIHYDRATE 2 PUFF: 160; 4.5 AEROSOL RESPIRATORY (INHALATION) at 07:24

## 2019-11-20 RX ADMIN — IPRATROPIUM BROMIDE AND ALBUTEROL SULFATE 3 ML: .5; 3 SOLUTION RESPIRATORY (INHALATION) at 07:24

## 2019-11-20 RX ADMIN — TIOTROPIUM BROMIDE 1 CAPSULE: 18 CAPSULE ORAL; RESPIRATORY (INHALATION) at 07:25

## 2019-11-20 RX ADMIN — IPRATROPIUM BROMIDE AND ALBUTEROL SULFATE 3 ML: .5; 3 SOLUTION RESPIRATORY (INHALATION) at 19:36

## 2019-11-20 RX ADMIN — RIVAROXABAN 20 MG: 20 TABLET, FILM COATED ORAL at 17:29

## 2019-11-20 RX ADMIN — GUAIFENESIN 600 MG: 600 TABLET, EXTENDED RELEASE ORAL at 04:51

## 2019-11-20 RX ADMIN — IPRATROPIUM BROMIDE AND ALBUTEROL SULFATE 3 ML: .5; 3 SOLUTION RESPIRATORY (INHALATION) at 02:14

## 2019-11-20 RX ADMIN — OMEPRAZOLE 20 MG: 20 CAPSULE, DELAYED RELEASE ORAL at 04:51

## 2019-11-20 RX ADMIN — BUDESONIDE AND FORMOTEROL FUMARATE DIHYDRATE 2 PUFF: 160; 4.5 AEROSOL RESPIRATORY (INHALATION) at 19:36

## 2019-11-20 RX ADMIN — IPRATROPIUM BROMIDE AND ALBUTEROL SULFATE 3 ML: .5; 3 SOLUTION RESPIRATORY (INHALATION) at 11:04

## 2019-11-20 RX ADMIN — LEVOTHYROXINE SODIUM 175 MCG: 125 TABLET ORAL at 04:50

## 2019-11-20 NOTE — DISCHARGE PLANNING
Received Transport Form @ 1611  Spoke to Jean @ [facility]    Transport is scheduled for 11/21/19 @0900 going to [facility].    Informed KHRIS Shanks and Charge Nurse Tiffany

## 2019-11-20 NOTE — DISCHARGE PLANNING
Agency/Facility Name: [facility]  Spoke To: Bekah  Outcome: Per Bekah, the patient's insurance does not cover ambulance rides. It only covers bus or [place] (wheelchair accessible car).

## 2019-11-20 NOTE — PROGRESS NOTES
Hospital Medicine Daily Progress Note    Date of Service  11/20/2019    Chief Complaint  69 y.o. female admitted 10/28/2019 with cardiorespiratory arrest.    Hospital Course  This is a 69 y.o. female COPD, hypothyroidism and morbid obesity admitted 10/28/2019 with respiratory arrest and cardiac arrest.  Patient was in her usual state of health until 10/28 when her power went out and her oxygen compressor did not work.  She was seen at [facility] who sent her home with an oxygen tank; however was found 4 hours later unconscious outside of her house by her neighbor.  The patient was taken to the ER where she was intubated after an ABG showed pH of 7.0 and elevated PCO2.  She subsequently suffered a PEA arrest after intubation.  Patient then transferred to [facility]. She subsequently  developed V. tach arrest and was shocked.  At [facility] she had a central line placed, and was started on amiodarone and Levophed.  A CTA of the chest was completed which was negative for pulmonary embolus; however a small right-sided pneumothorax as well as an effusion and volume loss was noted.  A CT of the head was negative. She was extubated on 10/30.  She received high flow for a few days then weaned down to 7-10L. She was diuresed aggressively.  Pulmonology recommended nocturnal bipap, however patient refused. During her hospital stay, she was noted to have atrial flutter, for which she was started on xarelto and started on amiodarone although given her severe COPD as well as history of hypthyroidism, she is likely not a candidate for long term amiodarone so that was discontinued prior to final load.  Her respiratory status has improved, and she is now on her baseline oxygen (3L) and has intermittent aflutter but rate is generally controlled on beta-blocker.   She is started on anticoagulation with Xarelto   Later on, she was found to have right foot hematoma with xray notable for fracture healing vs possible osteomyelitis.  MRI  obtained which was more consistent with subacute healing fractures.  She was also noted to have bilateral acute rib fractures. Orthopedics consulted who placed patient in post-op shoe.  In the course of her hospitalization, she was noted to have left breast mass. However, patient remained undecided regarding to have the breast mass biopsied or not.      She was evaluated by PT/OT, who recommended SNF placement which was pursued.  Otherwise, she remained hemodynamically stable and afebrile, and has generally clinically improved.      Interval Problem Update  11/20/2019 - I reviewed the patient's chart today. Uneventful night. VSS. Afebrile. Saturating well on 6L O2 NC.  CM/SW trying to arrange transportation to go to the SNF in [place].    > I have personally seen and examined the patient today.  She is asleep, easily arousable.  She has no complaints.  Denies any chest pain.  No shortness of breath.  No nausea or vomiting.  No abdominal pain.    Consultants/Specialty  Critical care   Cardiology  Orthopedic surgery    Code Status  Full    Disposition  SNF    Review of Systems  ROS   Pertinent positives/negatives as mentioned above.     A complete review of systems was personally done by me. All other systems were negative.       Physical Exam  Temp:  [36.2 °C (97.2 °F)-37 °C (98.6 °F)] 36.7 °C (98.1 °F)  Pulse:  [] 97  Resp:  [12-22] 16  BP: ()/(61-65) 100/64  SpO2:  [86 %-96 %] 92 %    Physical Exam  Constitutional:       Appearance: She is obese.   HENT:      Head: Normocephalic and atraumatic.      Nose: No congestion.      Mouth/Throat:      Mouth: Mucous membranes are moist.   Eyes:      Conjunctiva/sclera: Conjunctivae normal.      Pupils: Pupils are equal, round, and reactive to light.   Neck:      Musculoskeletal: No muscular tenderness.   Cardiovascular:      Rate and Rhythm: Normal rate and regular rhythm.   Pulmonary:      Effort: Accessory muscle usage present. No respiratory distress.       Breath sounds: Rales present. No wheezing.   Abdominal:      General: Bowel sounds are normal. There is no distension.      Palpations: Abdomen is soft.      Tenderness: There is no tenderness. There is no guarding or rebound.   Musculoskeletal:      Right lower leg: Edema present.      Left lower leg: Edema present.   Lymphadenopathy:      Cervical: No cervical adenopathy.   Skin:     Findings: Bruising (Right foot) present.   Neurological:      General: No focal deficit present.      Mental Status: She is alert and oriented to person, place, and time.      Cranial Nerves: No cranial nerve deficit.         I have performed the physical examination today 11/20/2019.  In review of yesterday's note, there are no new changes except as documented above.        Fluids  No intake or output data in the 24 hours ending 11/20/19 0808    Laboratory      Recent Labs     11/18/19  0600   SODIUM 143   POTASSIUM 3.9   CHLORIDE 98   CO2 40*   GLUCOSE 116*   BUN 31*   CREATININE 0.88   CALCIUM 11.6*                   Imaging  DX-CHEST-PORTABLE (1 VIEW)   Final Result         1. No significant interval change.      MR-FOOT-WITH & W/O RIGHT   Final Result      1. A nonenhancing hyperintense T1, isointense T2 1.4 x 4.1 x 4.3 cm area deep to the subcutaneous tissue about the dorsum of the foot with overlying subcutaneous edema. This is nonspecific but could relate to hematoma. Superimposed infection cannot be    entirely excluded.      2. Mildly displaced fractures at the distal fourth and fifth metatarsals. Minimal marrow edema within no definite marrow replacement, suggesting subacute healing fractures. Osteomyelitis is considered less likely.      DX-FOOT-2- RIGHT   Final Result      Soft tissue swelling with subacute appearing fractures involving the necks of the fourth and fifth metatarsals with possible bone resorption which could be due to fractures or osteomyelitis.      DX-CHEST-PORTABLE (1 VIEW)   Final Result      1.   Progressive volume loss in the right lung, with progressive rightward mediastinal shift. Underlying pneumonia and small pleural effusion are possible in the proper clinical settings.   2.  Slight worsening of left basilar atelectasis versus consolidation. No significant left effusion.      DX-CHEST-PORTABLE (1 VIEW)   Final Result      1.  Possible improved consolidation in the right lung versus related to differences in rotation.   2.  Improved interstitial opacity/edema.   3.  Left basilar atelectasis.      DX-CHEST-PORTABLE (1 VIEW)   Final Result      1.  Removal of endotracheal tube and enteric catheters      2.  No other change      DX-CHEST-PORTABLE (1 VIEW)   Final Result      Improved left upper lobe atelectasis and consolidation      Otherwise stable right worse than left consolidation and atelectasis with probable right pleural fluid      Bilateral acute rib fractures      EC-ECHOCARDIOGRAM COMPLETE W/O CONT   Final Result      DX-ABDOMEN FOR TUBE PLACEMENT   Final Result      Feeding tube in place as noted above.      DX-CHEST-PORTABLE (1 VIEW)   Final Result      Improving right upper lobe atelectasis. No significant change otherwise.      DX-ABDOMEN FOR TUBE PLACEMENT   Final Result      Nasogastric tube in place as noted above.      CT-CTA CHEST PULMONARY ARTERY W/ RECONS   Final Result   Addendum 1 of 1   Addendum dictated on 11/2/2019 by Dr. Peng as follows:   Addendum:   In reviewing the exam there is an incidentally noted inferomedial left    breast mass 19 x 23 mm. I discussed these findings seconds ago with the    intensivist currently covering the patient.      Final      DX-CHEST-PORTABLE (1 VIEW)   Final Result      Diffuse bilateral interstitial opacities, suggesting pulmonary edema. Additionally there are confluent and linear opacities throughout the right perihilar and upper lung field and in the left base, consistent with a combination of pneumonia and    atelectasis. All of this is  considerably worse compared with the prior image.           Assessment/Plan  * Acute on chronic respiratory failure with hypoxia and hypercapnia (HCC)- (present on admission)  Assessment & Plan  Intubated 10/28/2019, extubated 10/30  Chronic respiratory acidosis  Echo normal ejection fraction 55% and no pulmonary hypertension  Likely related to severe COPD could be pulmonary fibrosis or obstructive sleep apnea/hypoventilation syndrome  To her closely and consider BiPAP if needed  RT protocol: And inhalers  Lasix 40 mg PO BID   Patient feels she is on her baseline    Metatarsal bone fracture- (present on admission)  Assessment & Plan  postoperative shoe  Pain control    Chronic obstructive pulmonary disease with acute exacerbation (HCC)- (present on admission)  Assessment & Plan  Symbicort, RT protocol  No signs of pulmonary hypertension on the echo  Continue Lasix and inhalers at this time    Encephalopathy- (present on admission)  Assessment & Plan  Delirium due to acute respiratory failure   Improving   Alert and oriented x3  avoid benzodiazepines and anticholinergics  Frequent orientation  Avoid early morning labs  Avoid vital signs during sleep  Ambulate if possible      Hypophosphatemia- (present on admission)  Assessment & Plan  Follow level    Pulmonary hypertension (HCC)- (present on admission)  Assessment & Plan  Lasix    Left breast mass- (present on admission)  Assessment & Plan  Will need biopsy - awaiting pt's decision      Closed fracture of multiple ribs of both sides- (present on admission)  Assessment & Plan  Acute and previously healed, pain control    Collapse of right lung- (present on admission)  Assessment & Plan  resolved    Paroxysmal atrial flutter (HCC)  Assessment & Plan  Developed RVR   ContinueXarelto  Increase metoprolol to 25 BID     Hypomagnesemia- (present on admission)  Assessment & Plan  IV Mg given  Recheck level    Ventricular tachycardia (HCC)- (present on admission)  Assessment  & Plan  Amiodarone stopped on 11/6  Metoprolol 12.5 twice daily        Vitamin B12 deficiency- (present on admission)  Assessment & Plan  Resolved    Hypokalemia- (present on admission)  Assessment & Plan  Kdur  Follow BMP    Hypertension- (present on admission)  Assessment & Plan  Decreased metoprolol to 12.5 mg twice daily with parameters placed  Controlled continue monitoring    Other specified hypothyroidism- (present on admission)  Assessment & Plan  Synthroid increased to 175 mcg on 11/1/2019  Needs repeat TSH on 12/1/2019    Serum calcium elevated  Assessment & Plan  High PTH and phosphorus is low  Calcium around 11  Most likely primary hyperparathyroidism  Discussed this finding with the patient she refused surgery or work-up.  She returns for follow-up    Urinary retention  Assessment & Plan  Resolved    Thrombocytopenia (HCC)- (present on admission)  Assessment & Plan  Follow cbc    Morbid obesity (HCC)- (present on admission)  Assessment & Plan  Body mass index is 49.78 kg/m².       VTE prophylaxis: Xarelto    -Patient was unable to discharge today given transportation issues.  Service agreement will need to arrange for ambulance transportation to SNF, hopefully that can be arranged for tomorrow.  -Patient remains undecided regarding breast biopsy.  This needs to be addressed at the SNF.    I have performed the physical examination, and reviewed updated ROS and plan today 11/20/2019.  In review of yesterday's note, there are no new changes except as documented above.

## 2019-11-20 NOTE — DISCHARGE PLANNING
[facility] updated, last 24 hours transfer notes added.   [facility] transport form faxed to [facility] for tx on 11/21.  Maris from admission at [facility] updated on DC and tx POC.

## 2019-11-20 NOTE — DISCHARGE PLANNING
"Hospital Care Management Discharge Planning       Anticipated Discharge Disposition:   · [facility] on 11/19/2019 @ 0700     Action:   · This RN CM completed COBRA form and delivered to BS RNDiana.   · After 0300 tomorrow morning please print out the following:  · (1) Discharge Summary  · (2)Transfer Report  · 1. Go to the pt's summary tab  · 2. Type: \"Transfer Report\"  · 3. Click \"Print Transfer\"  · 4. Click blue \"Transfer Report\" Link  · 5. Click blue \"Select Time Range\" Link at the top of page.  · 6. Check \"Last 72 Hours\" bubble.  · 7. Right click and PRINT.  · Place documents in yellow envelope and seal.   · Please provide PINK copy of COBRA form to case management office prior to pt discharge.     Barriers to Discharge:   · None.     Plan:   · No other d/c planning needs identified at this time.    · Continue to provide support services and assistance with discharge planning as needed.      "

## 2019-11-20 NOTE — PROGRESS NOTES
Report received from KENZIE Lynn. Assumed care of patient. Updated patient on plan of care. Fall precautions in place, call light within reach.

## 2019-11-20 NOTE — DISCHARGE SUMMARY
Discharge Summary    CHIEF COMPLAINT ON ADMISSION  Chief Complaint   Patient presents with   • Shortness of Breath       Reason for Admission  EMS     CODE STATUS  Full Code    HPI & HOSPITAL COURSE  This is a 69 y.o. female COPD, hypothyroidism and morbid obesity admitted 10/28/2019 with respiratory arrest and cardiac arrest.  Patient was in her usual state of health until 10/28 when her power went out and her oxygen compressor did not work.  She was seen at [facility] who sent her home with an oxygen tank; however was found 4 hours later unconscious outside of her house by her neighbor.  The patient was taken to the ER where she was intubated after an ABG showed pH of 7.0 and elevated PCO2.  She subsequently suffered a PEA arrest after intubation.  Patient then transferred to [facility]. She subsequently  developed V. tach arrest and was shocked.  At [facility] she had a central line placed, and was started on amiodarone and Levophed.  A CTA of the chest was completed which was negative for pulmonary embolus; however a small right-sided pneumothorax as well as an effusion and volume loss was noted.  A CT of the head was negative. She was extubated on 10/30.  She received high flow for a few days then weaned down to 7-10L. She was diuresed aggressively.  Pulmonology recommended nocturnal bipap, however patient refused. During her hospital stay, she was noted to have atrial flutter, for which she was started on xarelto and started on amiodarone although given her severe COPD as well as history of hypthyroidism, she is likely not a candidate for long term amiodarone so that was discontinued prior to final load.  Her respiratory status has improved, and she is now on her baseline oxygen (3L) and has intermittent aflutter but rate is generally controlled on beta-blocker.   She is started on anticoagulation with Xarelto   Later on, she was found to have right foot hematoma with xray notable for fracture healing vs possible  osteomyelitis.  MRI obtained which was more consistent with subacute healing fractures.  She was also noted to have bilateral acute rib fractures. Orthopedics consulted who placed patient in post-op shoe.  In the course of her hospitalization, she was noted to have left breast mass. However, patient remained undecided regarding to have the breast mass biopsied or not.     She was evaluated by PT/OT, who recommended SNF placement which was pursued.  Otherwise, she remained hemodynamically stable and afebrile, and has generally clinically improved.    I have personally seen and examined the patient on the day of discharge. With her clinical improvement, she was deemed ready to discharge from the hospital as she did not have any further hospitalization needs. Patient felt comfortable going to the SNF. The discharge plan was discussed with the patient, with which she was agreeable to.     Therefore, she is discharged in good and stable condition to skilled nursing facility.    The patient met 2-midnight criteria for an inpatient stay at the time of discharge.      FOLLOW UP ITEMS POST DISCHARGE  -As above. She will continue with skilled therapies at the SNF.    DISCHARGE DIAGNOSES  Principal Problem:    Acute on chronic respiratory failure with hypoxia and hypercapnia (HCC) POA: Yes  Active Problems:    Encephalopathy POA: Yes    Chronic obstructive pulmonary disease with acute exacerbation (HCC) POA: Yes    Metatarsal bone fracture POA: Yes    Other specified hypothyroidism POA: Yes    Hypertension POA: Yes    Hypokalemia POA: Yes    Vitamin B12 deficiency POA: Yes    Ventricular tachycardia (HCC) POA: Yes    Hypomagnesemia POA: Yes    Paroxysmal atrial flutter (HCC) POA: No    Collapse of right lung POA: Yes    Closed fracture of multiple ribs of both sides POA: Yes    Left breast mass POA: Yes    Pulmonary hypertension (HCC) POA: Yes    Hypophosphatemia POA: Yes    Serum calcium elevated POA: Unknown    Morbid  obesity (HCC) POA: Yes    Thrombocytopenia (HCC) POA: Yes    Urinary retention POA: No  Resolved Problems:    Elevated troponin POA: Yes    Hypotension POA: Yes      FOLLOW UP  No future appointments.  [facility]  [address]  [facility] [address]  [phone]          MEDICATIONS ON DISCHARGE     Medication List      START taking these medications      Instructions   albuterol 108 (90 Base) MCG/ACT Aers inhalation aerosol   Inhale 2 Puffs by mouth every four hours as needed for Shortness of Breath.  Dose:  2 Puff     budesonide-formoterol 160-4.5 MCG/ACT Aero  Commonly known as:  SYMBICORT   Inhale 2 Puffs by mouth 2 Times a Day.  Dose:  2 Puff     furosemide 40 MG Tabs  Commonly known as:  LASIX   Take 1 Tab by mouth 2 Times a Day.  Dose:  40 mg     guaiFENesin  MG Tb12  Commonly known as:  MUCINEX   Take 1 Tab by mouth every 12 hours.  Dose:  600 mg     lidocaine 5 % Ptch  Commonly known as:  LIDODERM   Apply 1 Patch to skin as directed every 24 hours.  Dose:  1 Patch     metoprolol 25 MG Tabs  Commonly known as:  LOPRESSOR   Take 1 Tab by mouth 2 Times a Day.  Dose:  25 mg     omeprazole 20 MG delayed-release capsule  Start taking on:  November 20, 2019  Commonly known as:  PRILOSEC   Take 1 Cap by mouth every day.  Dose:  20 mg     rivaroxaban 20 MG Tabs tablet  Commonly known as:  XARELTO   Take 1 Tab by mouth with dinner.  Dose:  20 mg     tiotropium 18 MCG Caps  Start taking on:  November 20, 2019  Commonly known as:  SPIRIVA   Inhale 1 Cap by mouth every day.  Dose:  18 mcg        CHANGE how you take these medications      Instructions   ipratropium-albuterol 0.5-2.5 (3) MG/3ML nebulizer solution  What changed:  reasons to take this  Commonly known as:  DUONEB   3 mL by Nebulization route every four hours as needed for Shortness of Breath.  Dose:  3 mL        CONTINUE taking these medications      Instructions   levothyroxine 137 MCG Tabs  Commonly known as:   SYNTHROID   Take 1 Tab by mouth Every morning on an empty stomach.  Dose:  137 mcg        STOP taking these medications    nadolol 40 MG Tabs  Commonly known as:  CORGARD            Allergies  Allergies   Allergen Reactions   • Doxycycline      headache   • Penicillins      D/W Patient 06/2019 - unknown rxn as a kid; states she tolerated Keflex  -Tolerated Ceftriaxone inpatient   • Sulfa Drugs        DIET  Orders Placed This Encounter   Procedures   • Diet Order Cardiac     Standing Status:   Standing     Number of Occurrences:   1     Order Specific Question:   Diet:     Answer:   Cardiac [6]     Order Specific Question:   Texture/Fiber modifications:     Answer:   Dysphagia 1(Pureed)specify fluid consistency(question 6) [1]     Order Specific Question:   Consistency/Fluid modifications:     Answer:   Nectar Thick [2]     Comments:   no straws     Order Specific Question:   Miscellaneous modifications:     Answer:   SLP - 1:1 Supervision by Nursing [21]       ACTIVITY  As tolerated and directed by skilled nursing.  Weight bearing as tolerated    LINES, DRAINS, AND WOUNDS  This is an automated list. Peripheral IVs will be removed prior to discharge.  Peripheral IV 11/03/19 20 G Right Antecubital (Active)   Site Assessment Clean;Dry;Intact 11/19/2019  8:00 AM   Dressing Type Transparent 11/19/2019  8:00 AM   Line Status Flushed;Saline locked 11/19/2019  8:00 AM   Dressing Status Clean;Dry;Intact;Old drainage 11/19/2019  8:00 AM   Dressing Intervention N/A 11/19/2019  8:00 AM   Date Primary Tubing Changed 11/12/19 11/12/2019  9:00 PM   NEXT Primary Tubing Change  11/16/19 11/12/2019  9:00 PM   [place]fil[place] Grading [place] [facility] 0 11/19/2019  8:00 AM   Phlebitis Scale ([facility] Only) 0 11/19/2019  8:00 AM       Wound 11/05/19 Other (comment) Buttocks;Abdomen;Breast moisture associated skin damage with yeast (Active)   Wound Image   11/7/2019 10:00 AM   Site Assessment PARTH 11/19/2019  8:00 AM   Maira-wound Assessment PARTH  11/18/2019  8:50 PM   Margins PARTH 11/18/2019  8:50 PM   Drainage Amount PARTH 11/18/2019  8:50 PM   Non-staged Wound Description Partial thickness 11/14/2019  8:00 AM   Treatments Cleansed 11/13/2019  8:00 PM   Periwound Protectant Antifungal Therapy 11/16/2019  7:39 AM   Dressing Options Other (Comments) 11/18/2019  8:50 PM   Dressing Status Other (Comment) 11/18/2019  8:50 PM   NEXT Weekly Photo (Inpatient Only) 11/14/19 11/7/2019 10:00 AM   WOUND NURSE ONLY - Odor None 11/7/2019 10:00 AM   WOUND NURSE ONLY - Exposed Structures None 11/7/2019 10:00 AM   WOUND NURSE ONLY - Tissue Type and Percentage 100% red 11/7/2019 10:00 AM   WOUND NURSE ONLY - Time Spent with Patient (mins) 45 11/7/2019 10:00 AM       Wound 11/05/19 Traumatic Foot right, dorsal (Active)   Wound Image   11/7/2019 10:00 AM   Site Assessment Fragile;Light purple;Painful 11/19/2019  8:00 AM   Maira-wound Assessment Edema;Purple;Swelling 11/19/2019  8:00 AM   Margins Undefined edges;Attached edges 11/18/2019 11:00 AM   Wound Length (cm) 5.5 cm 11/7/2019 10:00 AM   Wound Width (cm) 4 cm 11/7/2019 10:00 AM   Wound Depth (cm) 0 cm 11/7/2019 10:00 AM   Wound Surface Area (cm^2) 22 cm^2 11/7/2019 10:00 AM   Tunneling 0 cm 11/7/2019 10:00 AM   Undermining 0 cm 11/7/2019 10:00 AM   Closure Adhesive bandage 11/18/2019 11:00 AM   Drainage Amount None 11/19/2019  8:00 AM   Drainage Description PARTH 11/17/2019  8:00 PM   Non-staged Wound Description Partial thickness 11/17/2019  8:00 PM   Treatments Site care 11/17/2019  8:00 PM   Cleansing Not Applicable 11/18/2019 11:00 AM   Periwound Protectant Not Applicable 11/17/2019  8:00 PM   Dressing Options Nonadherent Contact Layer;Adhesive Foam 11/19/2019  8:00 AM   Dressing Cleansing/Solutions Not Applicable 11/19/2019  8:00 AM   Dressing Changed Reinforced 11/19/2019  8:00 AM   Dressing Status Clean;Dry;Intact 11/19/2019  8:00 AM   Dressing Change Frequency Every 48 hrs 11/19/2019  8:00 AM   NEXT Dressing Change   11/19/19 11/19/2019  8:00 AM   NEXT Weekly Photo (Inpatient Only) 11/14/19 11/7/2019 10:00 AM   WOUND NURSE ONLY - Odor None 11/7/2019 10:00 AM   WOUND NURSE ONLY - Exposed Structures None 11/7/2019 10:00 AM   WOUND NURSE ONLY - Tissue Type and Percentage 100% serous filled bulla with significant ecchymosis 11/7/2019 10:00 AM       Peripheral IV 11/03/19 20 G Right Antecubital (Active)   Site Assessment Clean;Dry;Intact 11/19/2019  8:00 AM   Dressing Type Transparent 11/19/2019  8:00 AM   Line Status Flushed;Saline locked 11/19/2019  8:00 AM   Dressing Status Clean;Dry;Intact;Old drainage 11/19/2019  8:00 AM   Dressing Intervention N/A 11/19/2019  8:00 AM   Date Primary Tubing Changed 11/12/19 11/12/2019  9:00 PM   NEXT Primary Tubing Change  11/16/19 11/12/2019  9:00 PM   Infiltration Grading ([facility]) 0 11/19/2019  8:00 AM   Phlebitis Scale ([facility] Only) 0 11/19/2019  8:00 AM               MENTAL STATUS ON TRANSFER  Level of Consciousness: Alert  Orientation : Oriented x 4  Speech: Speech Clear    CONSULTATIONS  As above    PROCEDURES  As above    LABORATORY  Lab Results   Component Value Date    SODIUM 143 11/18/2019    POTASSIUM 3.9 11/18/2019    CHLORIDE 98 11/18/2019    CO2 40 (H) 11/18/2019    GLUCOSE 116 (H) 11/18/2019    BUN 31 (H) 11/18/2019    CREATININE 0.88 11/18/2019        Lab Results   Component Value Date    WBC 6.5 11/16/2019    HEMOGLOBIN 14.2 11/16/2019    HEMATOCRIT 47.6 (H) 11/16/2019    PLATELETCT 129 (L) 11/16/2019        Total time of the discharge process exceeds 40 minutes.

## 2019-11-20 NOTE — DISCHARGE PLANNING
Anticipated Discharge Disposition: SNF in [place]     Action: pt transportation this morning was not sufficient for pt condition. Pt insurance does not provide transport via ambulance. [facility] quote for transport was sent to leadership, Fabiola, for approved services. SNF called asking for medical updates. [facility] contact number for admissions was given to primary RN.     Barriers to Discharge: transportation to facility    Plan: FU on approved services for transportation      Length of Stay: 23

## 2019-11-20 NOTE — DISCHARGE PLANNING
Agency/Facility Name: [facility]  Spoke To: Jean  Outcome: Quote for patient transportation is $7869.60. Per Jean to give enough notice so he can get a team together.    Informed RNWIL Shanks.

## 2019-11-20 NOTE — CARE PLAN
Problem: Knowledge Deficit  Goal: Knowledge of disease process/condition, treatment plan, diagnostic tests, and medications will improve  Outcome: PROGRESSING AS EXPECTED  Note:   Pt educated regarding activity, diet, meds and plan of care. Verbalized understanding.       Problem: Safety  Goal: Will remain free from falls  Outcome: PROGRESSING AS EXPECTED  Note:   Discussed with patient the importance to use call light when assistance is needed. Patient verbalized understanding. Patient has bed alarm on, call light within reach, treaded socks on, and hourly rounding in place.

## 2019-11-21 VITALS
WEIGHT: 264.11 LBS | RESPIRATION RATE: 16 BRPM | OXYGEN SATURATION: 92 % | SYSTOLIC BLOOD PRESSURE: 100 MMHG | HEART RATE: 99 BPM | DIASTOLIC BLOOD PRESSURE: 65 MMHG | HEIGHT: 67 IN | BODY MASS INDEX: 41.45 KG/M2 | TEMPERATURE: 97.9 F

## 2019-11-21 PROCEDURE — 700101 HCHG RX REV CODE 250: Performed by: INTERNAL MEDICINE

## 2019-11-21 PROCEDURE — 700102 HCHG RX REV CODE 250 W/ 637 OVERRIDE(OP): Performed by: INTERNAL MEDICINE

## 2019-11-21 PROCEDURE — 94668 MNPJ CHEST WALL SBSQ: CPT

## 2019-11-21 PROCEDURE — 700102 HCHG RX REV CODE 250 W/ 637 OVERRIDE(OP): Performed by: HOSPITALIST

## 2019-11-21 PROCEDURE — A9270 NON-COVERED ITEM OR SERVICE: HCPCS | Performed by: INTERNAL MEDICINE

## 2019-11-21 PROCEDURE — A9270 NON-COVERED ITEM OR SERVICE: HCPCS | Performed by: FAMILY MEDICINE

## 2019-11-21 PROCEDURE — 99239 HOSP IP/OBS DSCHRG MGMT >30: CPT | Performed by: INTERNAL MEDICINE

## 2019-11-21 PROCEDURE — A9270 NON-COVERED ITEM OR SERVICE: HCPCS | Performed by: HOSPITALIST

## 2019-11-21 PROCEDURE — 94640 AIRWAY INHALATION TREATMENT: CPT

## 2019-11-21 PROCEDURE — 700102 HCHG RX REV CODE 250 W/ 637 OVERRIDE(OP): Performed by: FAMILY MEDICINE

## 2019-11-21 RX ORDER — ENEMA 19; 7 G/133ML; G/133ML
1 ENEMA RECTAL ONCE
Status: COMPLETED | OUTPATIENT
Start: 2019-11-21 | End: 2019-11-21

## 2019-11-21 RX ADMIN — LEVOTHYROXINE SODIUM 175 MCG: 125 TABLET ORAL at 05:32

## 2019-11-21 RX ADMIN — ALBUTEROL SULFATE 2 PUFF: 90 AEROSOL, METERED RESPIRATORY (INHALATION) at 05:35

## 2019-11-21 RX ADMIN — TIOTROPIUM BROMIDE 1 CAPSULE: 18 CAPSULE ORAL; RESPIRATORY (INHALATION) at 06:49

## 2019-11-21 RX ADMIN — GUAIFENESIN 600 MG: 600 TABLET, EXTENDED RELEASE ORAL at 05:32

## 2019-11-21 RX ADMIN — IPRATROPIUM BROMIDE AND ALBUTEROL SULFATE 3 ML: .5; 3 SOLUTION RESPIRATORY (INHALATION) at 06:46

## 2019-11-21 RX ADMIN — NYSTATIN: 100000 POWDER TOPICAL at 06:00

## 2019-11-21 RX ADMIN — SODIUM PHOSPHATE, DIBASIC AND SODIUM PHOSPHATE, MONOBASIC 133 ML: 7; 19 ENEMA RECTAL at 09:00

## 2019-11-21 RX ADMIN — SENNOSIDES AND DOCUSATE SODIUM 2 TABLET: 8.6; 5 TABLET ORAL at 05:31

## 2019-11-21 RX ADMIN — BUDESONIDE AND FORMOTEROL FUMARATE DIHYDRATE 2 PUFF: 160; 4.5 AEROSOL RESPIRATORY (INHALATION) at 06:48

## 2019-11-21 RX ADMIN — OMEPRAZOLE 20 MG: 20 CAPSULE, DELAYED RELEASE ORAL at 05:33

## 2019-11-21 NOTE — THERAPY
Physical Therapy Contact Note    Patient planned for DC to SNF today; will hold PT treatment for today and resume if DC unsuccessful.    Marie Romo, PT, DPT  [phone]

## 2019-11-21 NOTE — DISCHARGE PLANNING
Anticipated Discharge Disposition:   · SNF: [facility]    Action:   · Pt to discharge today to SNF in [facility] via [facility] at 0900.   · LSW updated COBRA and obtained new signature from pt. BSN reported she updated packet with new discharge summary and notes. Pt provided 2nd IMM.     Barriers to Discharge:   · None    Plan:   · Pt to discharge to SNF, via [facility] transport.

## 2019-11-21 NOTE — PROGRESS NOTES
Received report from day shift RNLeah. Assumed care of pt. Pt reports no pain at this time. Updated pt on plan of care. Pt resting comfortably in bed. Bed alarm in place. Educated on use of call light. Hourly rounding and continuous monitoring in place.

## 2019-11-21 NOTE — CARE PLAN
Problem: Bronchoconstriction:  Goal: Improve in air movement and diminished wheezing  Outcome: PROGRESSING AS EXPECTED     Problem: Oxygenation:  Goal: Maintain adequate oxygenation dependent on patient condition  Outcome: PROGRESSING AS EXPECTED     Problem: Bronchopulmonary Hygiene:  Goal: Increase mobilization of retained secretions  Outcome: PROGRESSING AS EXPECTED     MARCIN QID WITH FLUTTER, SYMBICORT BID, SPIRIVA DAILY

## 2019-11-21 NOTE — PROGRESS NOTES
Assumed care at 0700, bedside report received from NOC RN. Pt. Is aflutter on the monitor. Initial assessment completed, orders reviewed, call light within reach, bed alarm is in use, and hourly rounding in place. POC addressed with patient, no additional questions at this time.

## 2019-11-21 NOTE — DISCHARGE INSTRUCTIONS
Discharge Instructions    Discharged to other by medical transportation with escort. Discharged via ambulance, hospital escort: Refused.  Special equipment needed: Not Applicable    Be sure to schedule a follow-up appointment with your primary care doctor or any specialists as instructed.     Discharge Plan:   Influenza Vaccine Indication: Patient Refuses    I understand that a diet low in cholesterol, fat, and sodium is recommended for good health. Unless I have been given specific instructions below for another diet, I accept this instruction as my diet prescription.   Other diet: Cardiac, Dysphagia 1, Nectar Thick Liquids, 1:1 feed    Special Instructions: None    · Is patient discharged on Warfarin / Coumadin?   No       Chronic Respiratory Failure  Respiratory failure is when your lungs are not working well and your breathing (respiratory) system fails. When respiratory failure occurs, it is difficult for your lungs to get enough oxygen or get rid of carbon dioxide or both. Respiratory failure can be life threatening.  Respiratory failure can be acute or chronic. Acute respiratory failure is sudden, severe, and requires emergency medical treatment. Chronic respiratory failure is less severe, happens over time, and requires ongoing treatment.  What are the causes?  Any problem affecting the heart or lungs can cause respiratory failure. Some of these causes may be:  · Chronic bronchitis and emphysema (COPD).  · Blood clot going to the lung (pulmonary embolism).  · Having water in the lungs caused by heart failure, lung injury, or infection (pulmonary edema).  · Collapsed lung (pneumothorax).  · Pneumonia.  · Pulmonary fibrosis.  · Obesity.  · Asthma.  · Heart failure.  · Any type of trauma to the chest that can make breathing difficult.  · Nerve or muscle diseases making chest movements difficult.  What are the signs or symptoms?  Signs and symptoms of chronic respiratory failure include:  · Shortness of breath  (dyspnea) with or without activity.  · Rapid, fast breathing (tachypnea).  · Wheezing.  · Fast heart rate.  · Bluish color to the fingernail or toenail beds.  · Confusion or drowsiness or both.  How is this diagnosed?  Initial diagnosis requires a thorough history and a physical exam by your health care provider. Additional tests may include:  · Chest X-ray.  · CT scan of your lungs.  · Ultrasound to check for blood clots.  · Blood tests, such as an arterial blood gas test (ABG). This is a blood test that looks at the oxygen and carbon dioxide levels in your arterial blood.  · Your vital signs will be taken. This includes your respiratory rate (how many times a minute you are breathing), oxygen saturation (this measures the oxygen level in your blood), heart rate, and blood pressure. These numbers help your health care provider determine the next steps.  · Electrocardiogram.  How is this treated?  Treatment of chronic respiratory failure depends on the cause of the respiratory failure. Treatment can include the following:  · Oxygen. Oxygen can be delivered through the following:  ¨ Nasal cannula. This is small tubing that goes in your nose to give you oxygen.  ¨ Face mask. A face mask covers your nose and mouth to give you oxygen.  · Medicine. Different medicines can be given to help with breathing. These can include:  ¨ Nebulizers. Nebulizers deliver medicines to open the air passages (bronchodilators). These medicines help to open or relax the airways in the lungs so you can breathe better. They can also help loosen mucus from your lungs.  ¨ Diuretics. Diuretic medicines can help you breathe better by getting rid of extra fluid in your body.  ¨ Steroids. Steroid medicines can help decrease inflammation in your lungs.  · Chest tube. If you have a collapsed lung (pneumothorax), a chest tube is placed to help reinflate the lung.  · Noninvasive positive pressure ventilation (NPPV). This is a tight-fitting mask that  goes over your nose and mouth. The mask has tubing that is attached to a machine. The machine blows air into the tubing, which helps to keep the tiny air sacs (alveoli) in your lungs open. This machine allows you to breathe on your own.  · Ventilator. A ventilator is a breathing machine. When on a ventilator, a breathing tube is put into the lungs. A ventilator is used when you can no longer breathe well enough on your own. You may have low oxygen levels or high carbon dioxide (CO2) levels in your blood. When you are on a ventilator, sedation and pain medicines are given to make you sleep so your lungs can heal.  Follow these instructions at home:  · Follow your health care provider's directions about medicines and respiratory therapy.  · Quit smoking if you smoke.  Contact a health care provider if:  · You have increasing shortness of breath and are less functional than you have been.  · You have increased sputum, wheezing, coughing, or loss of energy.  · You are on oxygen and are requiring more.  Get help right away if:  · Your shortness of breath is significantly worse.  · You are unable to say more than a few words without having to catch your breath.  · You are much less functional.  This information is not intended to replace advice given to you by your health care provider. Make sure you discuss any questions you have with your health care provider.  Document Released: 12/18/2006 Document Revised: 05/25/2017 Document Reviewed: 10/16/2014  [employer] Interactive Patient Education © 2017 [employer]    Depression / Suicide Risk    As you are discharged from this [facility] facility, it is important to learn how to keep safe from harming yourself.    Recognize the warning signs:  · Abrupt changes in personality, positive or negative- including increase in energy   · Giving away possessions  · Change in eating patterns- significant weight changes-  positive or negative  · Change in sleeping patterns- unable to  sleep or sleeping all the time   · Unwillingness or inability to communicate  · Depression  · Unusual sadness, discouragement and loneliness  · Talk of wanting to die  · Neglect of personal appearance   · Rebelliousness- reckless behavior  · Withdrawal from people/activities they love  · Confusion- inability to concentrate     If you or a loved one observes any of these behaviors or has concerns about self-harm, here's what you can do:  · Talk about it- your feelings and reasons for harming yourself  · Remove any means that you might use to hurt yourself (examples: pills, rope, extension cords, firearm)  · Get professional help from the community (Mental Health, Substance Abuse, psychological counseling)  · Do not be alone:Call your Safe Contact- someone whom you trust who will be there for you.  · Call your local CRISIS HOTLINE 496-0639 or 815-187-7324  · Call your local Children's Mobile Crisis Response Team [place] (928) 615-2547 or www.[place]  · Call the toll free National Suicide Prevention Hotlines   · National Suicide Prevention Lifeline 842-631-ZFFY (3293)  · National Hope Line Network 800-SUICIDE (844-0786)  Rivaroxaban oral tablets  What is this medicine?  RIVAROXABAN (ri va ALE a ban) is an anticoagulant (blood thinner). It is used to treat blood clots in the lungs or in the veins. It is also used after knee or hip surgeries to prevent blood clots. It is also used to lower the chance of stroke in people with a medical condition called atrial fibrillation.  This medicine may be used for other purposes; ask your health care provider or pharmacist if you have questions.  COMMON BRAND NAME(S): Xarelto, Xarelto Starter Pack  What should I tell my health care provider before I take this medicine?  They need to know if you have any of these conditions:  -bleeding disorders  -bleeding in the brain  -blood in your stools (black or tarry stools) or if you have blood in your vomit  -history of  stomach bleeding  -kidney disease  -liver disease  -low blood counts, like low white cell, platelet, or red cell counts  -recent or planned spinal or epidural procedure  -take medicines that treat or prevent blood clots  -an unusual or allergic reaction to rivaroxaban, other medicines, foods, dyes, or preservatives  -pregnant or trying to get pregnant  -breast-feeding  How should I use this medicine?  Take this medicine by mouth with a glass of water. Follow the directions on the prescription label. Take your medicine at regular intervals. Do not take it more often than directed. Do not stop taking except on your doctor's advice. Stopping this medicine may increase your risk of a blood clot. Be sure to refill your prescription before you run out of medicine.  If you are taking this medicine after hip or knee replacement surgery, take it with or without food. If you are taking this medicine for atrial fibrillation, take it with your evening meal. If you are taking this medicine to treat blood clots, take it with food at the same time each day. If you are unable to swallow your tablet, you may crush the tablet and mix it in applesauce. Then, immediately eat the applesauce. You should eat more food right after you eat the applesauce containing the crushed tablet.  Talk to your pediatrician regarding the use of this medicine in children. Special care may be needed.  Overdosage: If you think you have taken too much of this medicine contact a poison control center or emergency room at once.  NOTE: This medicine is only for you. Do not share this medicine with others.  What if I miss a dose?  If you take your medicine once a day and miss a dose, take the missed dose as soon as you remember. If you take your medicine twice a day and miss a dose, take the missed dose immediately. In this instance, 2 tablets may be taken at the same time. The next day you should take 1 tablet twice a day as directed.  What may interact with  this medicine?  Do not take this medicine with any of the following medications:  -defibrotide  This medicine may also interact with the following medications:  -aspirin and aspirin-like medicines  -certain antibiotics like erythromycin, azithromycin, and clarithromycin  -certain medicines for fungal infections like ketoconazole and itraconazole  -certain medicines for irregular heart beat like amiodarone, quinidine, dronedarone  -certain medicines for seizures like carbamazepine, phenytoin  -certain medicines that treat or prevent blood clots like warfarin, enoxaparin, and dalteparin  -conivaptan  -diltiazem  -felodipine  -indinavir  -lopinavir; ritonavir  -NSAIDS, medicines for pain and inflammation, like ibuprofen or naproxen  -ranolazine  -rifampin  -ritonavir  -SNRIs, medicines for depression, like desvenlafaxine, duloxetine, levomilnacipran, venlafaxine  -SSRIs, medicines for depression, like citalopram, escitalopram, fluoxetine, fluvoxamine, paroxetine, sertraline  -[place]'s wort  -verapamil  This list may not describe all possible interactions. Give your health care provider a list of all the medicines, herbs, non-prescription drugs, or dietary supplements you use. Also tell them if you smoke, drink alcohol, or use illegal drugs. Some items may interact with your medicine.  What should I watch for while using this medicine?  Visit your doctor or health care professional for regular checks on your progress.  Notify your doctor or health care professional and seek emergency treatment if you develop breathing problems; changes in vision; chest pain; severe, sudden headache; pain, swelling, warmth in the leg; trouble speaking; sudden numbness or weakness of the face, arm or leg. These can be signs that your condition has gotten worse.  If you are going to have surgery or other procedure, tell your doctor that you are taking this medicine.  What side effects may I notice from receiving this medicine?  Side  effects that you should report to your doctor or health care professional as soon as possible:  -allergic reactions like skin rash, itching or hives, swelling of the face, lips, or tongue  -back pain  -redness, blistering, peeling or loosening of the skin, including inside the mouth  -signs and symptoms of bleeding such as bloody or black, tarry stools; red or dark-brown urine; spitting up blood or brown material that looks like coffee grounds; red spots on the skin; unusual bruising or bleeding from the eye, gums, or nose  Side effects that usually do not require medical attention (report to your doctor or health care professional if they continue or are bothersome):  -dizziness  -muscle pain  This list may not describe all possible side effects. Call your doctor for medical advice about side effects. You may report side effects to FDA at 4-449-FDA-7275.  Where should I keep my medicine?  Keep out of the reach of children.  Store at room temperature between 15 and 30 degrees C (59 and 86 degrees F). Throw away any unused medicine after the expiration date.  NOTE: This sheet is a summary. It may not cover all possible information. If you have questions about this medicine, talk to your doctor, pharmacist, or health care provider.  © 2018 [employer] (2017-09-06 16:29:33)    Dysphagia Diet Level 1, Pureed  The dysphasia level 1 diet includes foods that are completely pureed and smooth. The foods have a pudding-like texture, such as the texture of pureed pancakes, mashed potatoes, and yogurt. The diet does not include foods with lumps or coarse textures. Liquids should be smooth and may either be thin, nectar-thick, honey-like, or spoon-thick.  This diet is helpful for people with moderate to severe swallowing problems. It reduces the risk of food getting caught in the windpipe, trachea, or lungs. You may need help or supervision during meals while following this diet.  WHAT DO I NEED TO KNOW ABOUT THIS  "DIET?  Foods  · You may eat foods that are soft and have a pudding-like texture. If a food does not have this texture, you may be able to eat the food after:  ¨ Pureeing it. This can be done with a  or whisk.  ¨ Moistening it with liquid. For example, you may have bread if you soak it in milk or syrup.  · Avoid foods that are hard, dry, sticky, chunky, lumpy, or stringy. Also avoid foods with nuts, seeds, raisins, skins, and pulp.  · Do not eat foods that you have to chew. If you have to chew the food, then you cannot eat it.  · Eat a variety of foods to get all the nutrients you need.  Liquids  · You may drink liquids that are smooth. Your health care provider will tell you if you should drink thin or thickened liquids.  · To thicken a liquid, use a food and beverage thickener or a thickening food. Thickened liquids are usually a \"pudding-like\" consistency.  · Thin liquids include fruit juices, milk, coffee, tea, yogurts, shakes, and similar foods that melt to thin liquid at room temperature.  · Avoid liquids with seeds, pulp, or chunks.  See your dietitian or health care provider regularly for help with your dietary changes.  WHAT FOODS CAN I EAT?  Grains  Store-bought soft breads, pancakes, and [place] toast that have a smooth, moist texture and do not have nuts or seeds (you will need to moisten the food with liquid). Cooked cereals that have a pudding-like consistency, such as cream of wheat or farina (no oatmeal). Pureed, well-cooked pasta, rice, and plain bread stuffing.  Vegetables  Pureed vegetables. Soft avocado. Smooth tomato paste or sauce. Strained or pureed soups (these may need to be thickened as directed). Mashed or pureed potatoes without skin (can be seasoned with butter, smooth gravy, margarine, or sour cream).  Fruits  Pureed fruits such as melons and apples without seeds or pulp. Mashed bananas. Smooth tomato paste or sauce. Fruit juices without pulp or seeds. Strained or pureed " soups.  Meat and Other Protein Sources  Pureed meat. Smooth sultana or liverwurst. Smooth souffles. Pureed beans (such as lentils). Pureed eggs.  Dairy  Yogurt. Smooth cheese sauces. Milk (may need to be thickened). Nutritional dairy drinks or shakes. Ask your health care provider whether you can have ice cream.  Condiments  Finely ground salt, pepper, and other ground spices.  Sweets/Desserts  Smooth puddings and custards. Pureed desserts. Souffles. Whipped topping. Ask your health care provider whether you can have frozen desserts.  Fats and Oils  Butter. Margarine. Smooth and strained gravy. Sour cream. Mayonnaise. Cream cheese. Whipped topping. Smooth sauces (such as white sauce, cheese sauce, or hollandaise sauce).  The items listed above may not be a complete list of recommended foods or beverages. Contact your dietitian for more options.  WHAT FOODS ARE NOT RECOMMENDED?  Grains  Oatmeal. Dry cereals. Hard breads.  Vegetables  Whole vegetables. Stringy vegetables (such as celery). Thin tomato sauce.  Fruits  Whole fresh, frozen, canned, or dried fruits that have not been pureed. Stringy fruits (such as pineapple).  Meat and Other Protein Sources  Whole or ground meat, fish, or poultry. Dried or cooked lentils or legumes that have been cooked but not mashed or pureed. Non-pureed eggs. Nuts and seeds. Peanut butter.  Dairy  Non-pureed cheese. Dairy products with lumps or chunks. Ask your health care provider whether you can have ice cream.  Condiments  Coarse or seeded herbs and spices.  Sweets/Desserts  [place] preserves. Jams with seeds. Solid desserts. Sticky, chewy sweets (such as licorice and caramel). Ask your health care provider whether you can have frozen desserts.  Fats and Oils  Sauces of fats with lumps or chunks.  The items listed above may not be a complete list of foods and beverages to avoid. Contact your dietitian for more information.     This information is not intended to replace advice given to  you by your health care provider. Make sure you discuss any questions you have with your health care provider.     Document Released: 12/18/2006 Document Revised: 01/08/2016 Document Reviewed: 12/01/2014  Elsevier Interactive Patient Education ©2016 [employer]

## 2022-03-23 NOTE — CARE PLAN
Respiratory Update    Treatment modality:SVN, Flutter  Frequency:  DUO QID, Flutter QID  Pt is on 8LPM SP02 is 90%    Pt tolerating current treatments well with no adverse reactions.     -- DO NOT REPLY / DO NOT REPLY ALL --  -- Message is from the [facility]--      Patient is requesting a medication refill - medication is on active list    Was Medication Pended? Yes.    Rx Name and Dose: LORazepam (ATIVAN) 1 MG tablet    Rx Name and Dose: gabapentin (NEURONTIN) 100 MG capsule    Duration: 30 days    Pharmacy  [id number] [address]    Patient confirmed the above pharmacy as correct?  Yes    Does this request need an existing or new prescription at a pharmacy to be sent to a new pharmacy location?   No    Caller Information       Type Contact Phone    03/23/2022 01:49 PM CDT Phone (Incoming) Amber Lindsey (Self) [phone] (M)          Alternative phone number:     Turnaround time given to caller:   \"This message will be sent to [state Provider's name]. The clinical team will fulfill your request as soon as they review your message.\"

## 2022-10-28 NOTE — THERAPY
"Attempted PT treatment session. Pt reporting fatigue and needing to \"adjust her breathing\" after repositioning in bed just prior. Agreeable to participate this afternoon. Will re-attempt this PM as able.   "
"Physical Therapy Evaluation completed.   Bed Mobility:  Supine to Sit: Maximal Assist  Transfers: Sit to Stand: Minimal Assist  Gait: Level Of Assist: Unable to Participate     Plan of Care: Will benefit from Physical Therapy 3 times per week  Discharge Recommendations: Equipment: Will Continue to Assess for Equipment Needs. Post-acute therapy Discharge to a transitional care facility for continued skilled therapy services.    See \"Rehab Therapy-Acute\" Patient Summary Report for complete documentation.     Pt is a 67 y/o female that reports to acute care with LLE cellulitis. PMH includes CHF. Pt demonstrated impaired dynamic/static standing balance, impaired bed mobility, impaired transfers, and impaired gait. Pt required max A during transfers a long with vc. Pt showed decreased endurance and LE strength as she was only able to maintain standing w/ FWW for ~ 1 min. Pt would benefit from acute care PT to address mobility deficits. Recommend that pt receive post acute care placement with 24/7 care to address physical and mobility impairments.     "
0

## 2025-04-28 NOTE — PROGRESS NOTES
Cardiac Monitoring Report:  Rhythm: Afib 6/9 2030 till 6/10 0340     0340: SR  High: 114  Low: 79  CO: 0.12  / QRS: 0.08  / QT: 0.34    Chanell Gutierrez R.N.          Detail Level: Detailed